# Patient Record
Sex: FEMALE | Race: BLACK OR AFRICAN AMERICAN
[De-identification: names, ages, dates, MRNs, and addresses within clinical notes are randomized per-mention and may not be internally consistent; named-entity substitution may affect disease eponyms.]

---

## 2017-01-19 ENCOUNTER — HOSPITAL ENCOUNTER (OUTPATIENT)
Dept: HOSPITAL 72 - LAB | Age: 72
Discharge: HOME | End: 2017-01-19
Attending: INTERNAL MEDICINE
Payer: MEDICAID

## 2017-01-19 DIAGNOSIS — K57.92: ICD-10-CM

## 2017-01-19 DIAGNOSIS — M25.50: ICD-10-CM

## 2017-01-19 DIAGNOSIS — I10: Primary | ICD-10-CM

## 2017-01-19 LAB
ALBUMIN/GLOB SERPL: 1.3 {RATIO} (ref 1–2.7)
ALT SERPL-CCNC: 15 U/L (ref 3–33)
ANION GAP SERPL CALC-SCNC: 15 MMOL/L (ref 5–15)
AST SERPL-CCNC: 18 U/L (ref 5–40)
BASOPHILS NFR BLD AUTO: 1.2 % (ref 0–2)
CALCIUM SERPL-MCNC: 9.8 MG/DL (ref 8.6–10.2)
CHLORIDE SERPL-SCNC: 102 MEQ/L (ref 98–107)
CHOLEST SERPL-MCNC: 155 MG/DL (ref ?–200)
CHOLEST/HDLC SERPL: 3.3 {RATIO} (ref 3.3–4.4)
CO2 SERPL-SCNC: 25 MEQ/L (ref 20–30)
CREAT SERPL-MCNC: 1.3 MG/DL (ref 0.5–0.9)
EOSINOPHIL NFR BLD AUTO: 1 % (ref 0–3)
ERYTHROCYTE [DISTWIDTH] IN BLOOD BY AUTOMATED COUNT: 11.2 % (ref 11.6–14.8)
GFR SERPLBLD BASED ON 1.73 SQ M-ARVRAT: 49.1 ML/MIN (ref 60–?)
GLOBULIN SER-MCNC: 3.3 G/DL
HBA1C MFR BLD: 5.1 % (ref ?–6)
HEMOLYSIS: 3
LDLC SERPL ELPH-MCNC: 92 MG/DL (ref 60–99)
LYMPHOCYTES NFR BLD AUTO: 27.4 % (ref 20–45)
MCH RBC QN AUTO: 33 PG (ref 27–31)
MCHC RBC AUTO-ENTMCNC: 32.6 G/DL (ref 32–36)
MCV RBC AUTO: 101 FL (ref 80–99)
MONOCYTES NFR BLD AUTO: 8.1 % (ref 1–10)
NEUTROPHILS NFR BLD AUTO: 62.4 % (ref 45–75)
PLATELET # BLD: 258 K/UL (ref 150–450)
PMV BLD AUTO: 6.8 FL (ref 6.5–10.1)
POTASSIUM SERPL-SCNC: 3.5 MEQ/L (ref 3.4–4.9)
PROT SERPL-MCNC: 7.6 G/DL (ref 6.6–8.7)
RBC # BLD AUTO: 4.06 M/UL (ref 4.2–5.4)
SODIUM SERPL-SCNC: 142 MEQ/L (ref 135–145)
TSH SERPL-ACNC: 0.78 UIU/ML (ref 0.3–4.5)
WBC # BLD AUTO: 4.2 K/UL (ref 4.8–10.8)

## 2017-01-19 PROCEDURE — 80061 LIPID PANEL: CPT

## 2017-01-19 PROCEDURE — 86039 ANTINUCLEAR ANTIBODIES (ANA): CPT

## 2017-01-19 PROCEDURE — 36415 COLL VENOUS BLD VENIPUNCTURE: CPT

## 2017-01-19 PROCEDURE — 86431 RHEUMATOID FACTOR QUANT: CPT

## 2017-01-19 PROCEDURE — 86200 CCP ANTIBODY: CPT

## 2017-01-19 PROCEDURE — 82085 ASSAY OF ALDOLASE: CPT

## 2017-01-19 PROCEDURE — 83036 HEMOGLOBIN GLYCOSYLATED A1C: CPT

## 2017-01-19 PROCEDURE — 84436 ASSAY OF TOTAL THYROXINE: CPT

## 2017-01-19 PROCEDURE — 85025 COMPLETE CBC W/AUTO DIFF WBC: CPT

## 2017-01-19 PROCEDURE — 80053 COMPREHEN METABOLIC PANEL: CPT

## 2017-01-19 PROCEDURE — 84443 ASSAY THYROID STIM HORMONE: CPT

## 2017-01-19 PROCEDURE — 82550 ASSAY OF CK (CPK): CPT

## 2017-01-20 LAB — RHEUMATOID FACT SER QL LA: <10 IU/ML (ref 0–13.9)

## 2017-01-21 LAB — ALDOLASE SERPL-CCNC: 5.3 U/L (ref 3.3–10.3)

## 2017-01-22 LAB — Lab: 10 UNITS (ref 0–19)

## 2017-03-09 ENCOUNTER — HOSPITAL ENCOUNTER (OUTPATIENT)
Dept: HOSPITAL 72 - LAB | Age: 72
Discharge: HOME | End: 2017-03-09
Attending: INTERNAL MEDICINE
Payer: MEDICARE

## 2017-03-09 DIAGNOSIS — J44.9: Primary | ICD-10-CM

## 2017-03-09 DIAGNOSIS — I10: ICD-10-CM

## 2017-03-09 DIAGNOSIS — M19.90: ICD-10-CM

## 2017-03-09 DIAGNOSIS — K21.9: ICD-10-CM

## 2017-03-09 LAB
ALBUMIN/GLOB SERPL: 1.2 {RATIO} (ref 1–2.7)
ALT SERPL-CCNC: 13 U/L (ref 3–33)
ANION GAP SERPL CALC-SCNC: 17 MMOL/L (ref 5–15)
AST SERPL-CCNC: 16 U/L (ref 5–40)
BASOPHILS NFR BLD AUTO: 1.3 % (ref 0–2)
CALCIUM SERPL-MCNC: 10.4 MG/DL (ref 8.6–10.2)
CHLORIDE SERPL-SCNC: 97 MEQ/L (ref 98–107)
CO2 SERPL-SCNC: 27 MEQ/L (ref 20–30)
CREAT SERPL-MCNC: 1.3 MG/DL (ref 0.5–0.9)
EOSINOPHIL NFR BLD AUTO: 0.5 % (ref 0–3)
ERYTHROCYTE [DISTWIDTH] IN BLOOD BY AUTOMATED COUNT: 11.8 % (ref 11.6–14.8)
GFR SERPLBLD BASED ON 1.73 SQ M-ARVRAT: 49.1 ML/MIN (ref 60–?)
GLOBULIN SER-MCNC: 3.6 G/DL
HBA1C MFR BLD: 4.9 % (ref ?–6)
HEMOLYSIS: 2
LYMPHOCYTES NFR BLD AUTO: 24.2 % (ref 20–45)
MCH RBC QN AUTO: 34 PG (ref 27–31)
MCHC RBC AUTO-ENTMCNC: 32.8 G/DL (ref 32–36)
MCV RBC AUTO: 104 FL (ref 80–99)
MONOCYTES NFR BLD AUTO: 8.1 % (ref 1–10)
NEUTROPHILS NFR BLD AUTO: 65.9 % (ref 45–75)
PLATELET # BLD: 284 K/UL (ref 150–450)
PMV BLD AUTO: 6.7 FL (ref 6.5–10.1)
POTASSIUM SERPL-SCNC: 3.6 MEQ/L (ref 3.4–4.9)
PROT SERPL-MCNC: 8.1 G/DL (ref 6.6–8.7)
RBC # BLD AUTO: 4.09 M/UL (ref 4.2–5.4)
SODIUM SERPL-SCNC: 141 MEQ/L (ref 135–145)
WBC # BLD AUTO: 5 K/UL (ref 4.8–10.8)

## 2017-03-09 PROCEDURE — 86812 HLA TYPING A B OR C: CPT

## 2017-03-09 PROCEDURE — 83036 HEMOGLOBIN GLYCOSYLATED A1C: CPT

## 2017-03-09 PROCEDURE — 87086 URINE CULTURE/COLONY COUNT: CPT

## 2017-03-09 PROCEDURE — 36415 COLL VENOUS BLD VENIPUNCTURE: CPT

## 2017-03-09 PROCEDURE — 87181 SC STD AGAR DILUTION PER AGT: CPT

## 2017-03-09 PROCEDURE — 80053 COMPREHEN METABOLIC PANEL: CPT

## 2017-03-09 PROCEDURE — 82085 ASSAY OF ALDOLASE: CPT

## 2017-03-09 PROCEDURE — 85025 COMPLETE CBC W/AUTO DIFF WBC: CPT

## 2017-03-09 PROCEDURE — 82550 ASSAY OF CK (CPK): CPT

## 2017-03-14 LAB — ALDOLASE SERPL-CCNC: 4 U/L (ref 3.3–10.3)

## 2017-03-28 ENCOUNTER — HOSPITAL ENCOUNTER (OUTPATIENT)
Dept: HOSPITAL 72 - ULS | Age: 72
Discharge: HOME | End: 2017-03-28
Payer: MEDICARE

## 2017-03-28 DIAGNOSIS — R10.9: Primary | ICD-10-CM

## 2017-03-28 DIAGNOSIS — N28.1: ICD-10-CM

## 2017-03-28 DIAGNOSIS — I49.9: ICD-10-CM

## 2017-03-28 DIAGNOSIS — R11.10: ICD-10-CM

## 2017-03-28 PROCEDURE — 93225 XTRNL ECG REC<48 HRS REC: CPT

## 2017-03-28 PROCEDURE — 93226 XTRNL ECG REC<48 HR SCAN A/R: CPT

## 2017-03-28 PROCEDURE — 76700 US EXAM ABDOM COMPLETE: CPT

## 2017-03-28 NOTE — DIAGNOSTIC IMAGING REPORT
Indications: Abdominal pain and vomiting



Technique: Transabdominal real-time grayscale and duplex Doppler imaging of the

upper abdomen and retroperitoneum was performed.



Findings:



Comparison: Abdominopelvic CT scan 6/27/16.



Liver normal size and surface contour, parenchymal echogenicity. No focal lesions.



Gallbladder unremarkable.  No intraluminal stones or sludge. No mural thickening 

or

adjacent fluid collections. Sonographic Hobbs sign not reported..



Bile ducts normal caliber.  Common bile duct 5 mm.



Pancreas head and body unremarkable; tail obscured.



Spleen unremarkable.



Right kidney unremarkable.



Left kidney contains 16 mm circumscribed anechoic masses emanating from upper 

pole

cortex, otherwise unremarkable.



Proximal and mid abdominal aorta, intrahepatic portion of inferior vena cava 

patent,

normal caliber. Distal bowel aorta obscured by bowel gas.



Duplex Doppler imaging demonstrates antegrade flow in splenic, portal, hepatic veins.



No ascites.



IMPRESSION:



Pancreatic tail, distal bowel aorta obscured



Left renal cortical cyst



Otherwise negative abdominal ultrasound.

## 2017-05-24 ENCOUNTER — HOSPITAL ENCOUNTER (EMERGENCY)
Dept: HOSPITAL 72 - EMR | Age: 72
Discharge: HOME | End: 2017-05-24
Payer: MEDICARE

## 2017-05-24 VITALS — SYSTOLIC BLOOD PRESSURE: 158 MMHG | DIASTOLIC BLOOD PRESSURE: 67 MMHG

## 2017-05-24 VITALS — WEIGHT: 187 LBS | BODY MASS INDEX: 31.92 KG/M2 | HEIGHT: 64 IN

## 2017-05-24 VITALS — SYSTOLIC BLOOD PRESSURE: 144 MMHG | DIASTOLIC BLOOD PRESSURE: 61 MMHG

## 2017-05-24 VITALS — SYSTOLIC BLOOD PRESSURE: 139 MMHG | DIASTOLIC BLOOD PRESSURE: 69 MMHG

## 2017-05-24 DIAGNOSIS — K43.9: ICD-10-CM

## 2017-05-24 DIAGNOSIS — N28.1: ICD-10-CM

## 2017-05-24 DIAGNOSIS — K57.30: ICD-10-CM

## 2017-05-24 DIAGNOSIS — E11.9: ICD-10-CM

## 2017-05-24 DIAGNOSIS — R11.10: ICD-10-CM

## 2017-05-24 DIAGNOSIS — R10.9: Primary | ICD-10-CM

## 2017-05-24 DIAGNOSIS — I10: ICD-10-CM

## 2017-05-24 DIAGNOSIS — J44.9: ICD-10-CM

## 2017-05-24 DIAGNOSIS — K76.0: ICD-10-CM

## 2017-05-24 DIAGNOSIS — Z87.19: ICD-10-CM

## 2017-05-24 DIAGNOSIS — Z88.6: ICD-10-CM

## 2017-05-24 DIAGNOSIS — K44.9: ICD-10-CM

## 2017-05-24 LAB
ALBUMIN/GLOB SERPL: 1.4 {RATIO} (ref 1–2.7)
ALT SERPL-CCNC: 20 U/L (ref 3–33)
ANION GAP SERPL CALC-SCNC: 15 MMOL/L (ref 5–15)
APPEARANCE UR: CLEAR
APTT BLD: 26 SEC (ref 23–33)
AST SERPL-CCNC: 27 U/L (ref 5–40)
BACTERIA #/AREA URNS HPF: (no result) /HPF
BASOPHILS NFR BLD AUTO: 1.4 % (ref 0–2)
BILIRUB DIRECT SERPL-MCNC: 0.3 MG/DL (ref 0.1–0.3)
CALCIUM SERPL-MCNC: 10.3 MG/DL (ref 8.6–10.2)
CHLORIDE SERPL-SCNC: 99 MEQ/L (ref 98–107)
CK MB SERPL-MCNC: < 1.5 NG/ML (ref ?–3.8)
CO2 SERPL-SCNC: 25 MEQ/L (ref 20–30)
CREAT SERPL-MCNC: 1.2 MG/DL (ref 0.5–0.9)
EOSINOPHIL NFR BLD AUTO: 0.5 % (ref 0–3)
ERYTHROCYTE [DISTWIDTH] IN BLOOD BY AUTOMATED COUNT: 11.6 % (ref 11.6–14.8)
GLOBULIN SER-MCNC: 3.1 G/DL
HEMOLYSIS: 5
INR PPP: 1 (ref 0.9–1.1)
KETONES UR QL STRIP: NEGATIVE
LEUKOCYTE ESTERASE UR QL STRIP: (no result)
LIPASE SERPL-CCNC: 25 U/L (ref ?–60)
LYMPHOCYTES NFR BLD AUTO: 26.5 % (ref 20–45)
MCH RBC QN AUTO: 33.9 PG (ref 27–31)
MCHC RBC AUTO-ENTMCNC: 33.4 G/DL (ref 32–36)
MCV RBC AUTO: 102 FL (ref 80–99)
MONOCYTES NFR BLD AUTO: 6.9 % (ref 1–10)
NEUTROPHILS NFR BLD AUTO: 64.7 % (ref 45–75)
NITRITE UR QL STRIP: NEGATIVE
PH UR STRIP: 7 [PH] (ref 4.5–8)
PLATELET # BLD: 267 K/UL (ref 150–450)
PMV BLD AUTO: 6.4 FL (ref 6.5–10.1)
POTASSIUM SERPL-SCNC: 3.3 MEQ/L (ref 3.4–4.9)
PROT SERPL-MCNC: 7.5 G/DL (ref 6.6–8.7)
PROT UR QL STRIP: NEGATIVE
PROTHROMBIN TIME: 10 SEC (ref 9.3–11.5)
RBC # BLD AUTO: 4.14 M/UL (ref 4.2–5.4)
RBC #/AREA URNS HPF: (no result) /HPF (ref 0–2)
SODIUM SERPL-SCNC: 139 MEQ/L (ref 135–145)
SP GR UR STRIP: 1 (ref 1–1.03)
SQUAMOUS #/AREA URNS LPF: (no result) /LPF
TROPONIN I SERPL-MCNC: < 0.3 NG/ML (ref ?–0.3)
UROBILINOGEN UR-MCNC: NORMAL MG/DL (ref 0–1)
WBC # BLD AUTO: 4.8 K/UL (ref 4.8–10.8)
WBC #/AREA URNS HPF: (no result) /HPF (ref 0–2)

## 2017-05-24 PROCEDURE — 96372 THER/PROPH/DIAG INJ SC/IM: CPT

## 2017-05-24 PROCEDURE — 85730 THROMBOPLASTIN TIME PARTIAL: CPT

## 2017-05-24 PROCEDURE — 82248 BILIRUBIN DIRECT: CPT

## 2017-05-24 PROCEDURE — 86901 BLOOD TYPING SEROLOGIC RH(D): CPT

## 2017-05-24 PROCEDURE — 96374 THER/PROPH/DIAG INJ IV PUSH: CPT

## 2017-05-24 PROCEDURE — 83690 ASSAY OF LIPASE: CPT

## 2017-05-24 PROCEDURE — 85610 PROTHROMBIN TIME: CPT

## 2017-05-24 PROCEDURE — 76937 US GUIDE VASCULAR ACCESS: CPT

## 2017-05-24 PROCEDURE — 99284 EMERGENCY DEPT VISIT MOD MDM: CPT

## 2017-05-24 PROCEDURE — 81003 URINALYSIS AUTO W/O SCOPE: CPT

## 2017-05-24 PROCEDURE — 36415 COLL VENOUS BLD VENIPUNCTURE: CPT

## 2017-05-24 PROCEDURE — 82553 CREATINE MB FRACTION: CPT

## 2017-05-24 PROCEDURE — 96360 HYDRATION IV INFUSION INIT: CPT

## 2017-05-24 PROCEDURE — 36569 INSJ PICC 5 YR+ W/O IMAGING: CPT

## 2017-05-24 PROCEDURE — 86850 RBC ANTIBODY SCREEN: CPT

## 2017-05-24 PROCEDURE — 84484 ASSAY OF TROPONIN QUANT: CPT

## 2017-05-24 PROCEDURE — 80053 COMPREHEN METABOLIC PANEL: CPT

## 2017-05-24 PROCEDURE — 85025 COMPLETE CBC W/AUTO DIFF WBC: CPT

## 2017-05-24 PROCEDURE — 96375 TX/PRO/DX INJ NEW DRUG ADDON: CPT

## 2017-05-24 PROCEDURE — 74177 CT ABD & PELVIS W/CONTRAST: CPT

## 2017-05-24 PROCEDURE — 86900 BLOOD TYPING SEROLOGIC ABO: CPT

## 2017-05-24 RX ADMIN — MORPHINE SULFATE ONE MG: 4 INJECTION INTRAVENOUS at 17:37

## 2017-05-24 RX ADMIN — MORPHINE SULFATE ONE MG: 4 INJECTION INTRAVENOUS at 14:42

## 2017-05-24 NOTE — EMERGENCY ROOM REPORT
History of Present Illness


General


Chief Complaint:  Abdominal Pain


Source:  Patient


 (FARZANA COREY M.D.)





Present Illness


HPI


72-year-old female presents ED for evaluation.  Patient was referred here from 

GI office for workup.  Patient has history of ulcerative colitis 

diverticulitis.  Patient states her last week she's been having abdominal pain 

with vomiting.  Notes blood in the stool.  The pain is a 10 out of 10, sharp, 

nonradiating.  Denies fevers chills.  Denies chest pain shortness of breath.  

Denies any blood thinners.  No other aggravating relieving factors.  Denies any 

other associated symptoms


 (FARZANA COREY M.D.)


Allergies:  


Coded Allergies:  


     CODEINE (Verified  Adverse Reaction, Mild, 1/25/13)


 nausea





Patient History


Past Medical History:  DM, HTN, asthma, COPD, other - diverticulitis, 

ulcerative coliits


Past Surgical History:  none


Pertinent Family History:  none


Social History:  Denies: alcohol use, drug use, smoking


Pregnant Now:  No


Immunizations:  UTD


Reviewed Nursing Documentation:  PMH: Agreed, PSxH: Agreed (FARZANA COREY M.D.)





Nursing Documentation-PMH


Past Medical History:  No History, Except For


Hx Hypertension:  Yes


Hx Pacemaker:  No


Hx Asthma:  Yes


Hx COPD:  Yes


Hx Diabetes:  Yes - controlled


Hx Cancer:  No


Hx Gastrointestinal Problems:  Yes - diverticulitis, ulcerative colitis


Hx Dialysis:  No


Hx Neurological Problems:  No


Hx Cerebrovascular Accident:  No


Hx Seizures:  Yes - 2 years ago


 (FARZANA COREY M.D.)





Review of Systems


All Other Systems:  negative except mentioned in HPI


 (FARZANA COREY M.D.)





Physical Exam





Vital Signs








  Date Time  Temp Pulse Resp B/P Pulse Ox O2 Delivery O2 Flow Rate FiO2


 


5/24/17 13:06 97.7 83 18 145/82 100 Room Air  








Sp02 EP Interpretation:  reviewed, normal


General Appearance:  no apparent distress, alert, GCS 15, non-toxic


Head:  normocephalic, atraumatic


Eyes:  bilateral eye PERRL, bilateral eye normal inspection


ENT:  hearing grossly normal, normal pharynx, no angioedema, normal voice


Neck:  full range of motion, supple/symm/no masses


Respiratory:  chest non-tender, lungs clear, normal breath sounds, speaking 

full sentences


Cardiovascular #1:  regular rate, rhythm, no edema


Cardiovascular #2:  2+ carotid (R), 2+ carotid (L), 2+ radial (R), 2+ radial (L)

, 2+ dorsalis pedis (R), 2+ dorsalis pedis (L)


Gastrointestinal:  normal bowel sounds, soft, non-distended, no guarding, no 

rebound, tenderness


Rectal:  deferred


Genitourinary:  normal inspection, no CVA tenderness


Musculoskeletal:  back normal, gait/station normal, normal range of motion, non-

tender


Neurologic:  alert, oriented x3, responsive, motor strength/tone normal, 

sensory intact, speech normal


Psychiatric:  judgement/insight normal, memory normal, mood/affect normal, no 

suicidal/homicidal ideation


Reflexes:  3+ bicep (R), 3+ bicep (L), 3+ tricep (R), 3+ tricep (L), 3+ knee (R)

, 3+ knee (L)


Skin:  normal color, no rash, warm/dry, well hydrated


Lymphatic:  no adenopathy


 (FARZANA COREY M.D.)





Medical Decision Making


Diagnostic Impression:  


 Primary Impression:  


 Abdominal pain


 Qualified Codes:  R10.84 - Generalized abdominal pain





Labs








Test


  5/24/17


13:30 5/24/17


14:00


 


Urine Color Yellow  


 


Urine Appearance Clear  


 


Urine pH 7 (4.5-8.0)  


 


Urine Specific Gravity


  1.005


(1.005-1.035) 


 


 


Urine Protein


  Negative


(NEGATIVE) 


 


 


Urine Glucose (UA)


  Negative


(NEGATIVE) 


 


 


Urine Ketones


  Negative


(NEGATIVE) 


 


 


Urine Occult Blood


  Negative


(NEGATIVE) 


 


 


Urine Nitrite


  Negative


(NEGATIVE) 


 


 


Urine Bilirubin


  Negative


(NEGATIVE) 


 


 


Urine Urobilinogen


  Normal MG/DL


(0.0-1.0) 


 


 


Urine Leukocyte Esterase 1+ (NEGATIVE)  


 


Urine RBC


  0-2 /HPF (0 -


2) 


 


 


Urine WBC


  0-2 /HPF (0 -


2) 


 


 


Urine Squamous Epithelial


Cells Few /LPF


(NONE/OCC) 


 


 


Urine Bacteria


  Occasional


/HPF (NONE) 


 


 


White Blood Count


  


  4.8 K/UL


(4.8-10.8)


 


Red Blood Count


  


  4.14 M/UL


(4.20-5.40)


 


Hemoglobin


  


  14.0 G/DL


(12.0-16.0)


 


Hematocrit


  


  42.1 %


(37.0-47.0)


 


Mean Corpuscular Volume  102 FL (80-99) 


 


Mean Corpuscular Hemoglobin


  


  33.9 PG


(27.0-31.0)


 


Mean Corpuscular Hemoglobin


Concent 


  33.4 G/DL


(32.0-36.0)


 


Red Cell Distribution Width


  


  11.6 %


(11.6-14.8)


 


Platelet Count


  


  267 K/UL


(150-450)


 


Mean Platelet Volume


  


  6.4 FL


(6.5-10.1)


 


Neutrophils (%) (Auto)


  


  64.7 %


(45.0-75.0)


 


Lymphocytes (%) (Auto)


  


  26.5 %


(20.0-45.0)


 


Monocytes (%) (Auto)


  


  6.9 %


(1.0-10.0)


 


Eosinophils (%) (Auto)


  


  0.5 %


(0.0-3.0)


 


Basophils (%) (Auto)


  


  1.4 %


(0.0-2.0)


 


Prothrombin Time


  


  10.0 SEC


(9.30-11.50)


 


Prothromb Time International


Ratio 


  1.0 (0.9-1.1) 


 


 


Activated Partial


Thromboplast Time 


  26 SEC (23-33) 


 


 


Sodium Level


  


  139 mEQ/L


(135-145)


 


Potassium Level


  


  3.3 mEQ/L


(3.4-4.9)


 


Chloride Level


  


  99 mEQ/L


()


 


Carbon Dioxide Level


  


  25 mEQ/L


(20-30)


 


Anion Gap  15 (5-15) 


 


Blood Urea Nitrogen


  


  19 mg/dL


(7-23)


 


Creatinine


  


  1.2 mg/dL


(0.5-0.9)


 


Estimat Glomerular Filtration


Rate 


   mL/min (>60) 


 


 


Glucose Level


  


  99 mg/dL


()


 


Calcium Level


  


  10.3 mg/dL


(8.6-10.2)


 


Total Bilirubin


  


  1.4 mg/dL


(0.0-1.2)


 


Direct Bilirubin


  


  0.3 mg/dL


(0.1-0.3)


 


Aspartate Amino Transf


(AST/SGOT) 


  27 U/L (5-40) 


 


 


Alanine Aminotransferase


(ALT/SGPT) 


  20 U/L (3-33) 


 


 


Alkaline Phosphatase


  


  83 U/L


()


 


Creatine Kinase MB


  


  < 1.5 ng/mL (<


3.8)


 


Troponin I


  


  < 0.30 ng/mL


(<=0.30)


 


Total Protein


  


  7.5 g/dL


(6.6-8.7)


 


Albumin


  


  4.4 g/dL


(3.5-5.2)


 


Globulin  3.1 g/dL 


 


Albumin/Globulin Ratio  1.4 (1.0-2.7) 


 


Lipase  25 U/L (< 60) 








 (FARZANA COREY M.D.)


ER Course


Received signout from Dr Corey to followup CTAP


No acute findings on CT


I reviewed labs: no leuks.  H&H stable.  Elevated SerumCr c/w known CKD


UA negative for UTI


Spoke to Dr Bal - recommends DC patient for small intestinal bacterial 

overgrowth with Abx and followup with him in office


Patient tolerating PO


PICC line removed


DC home


 (MAGNOLIA HUTTON M.D.)





Last Vital Signs








  Date Time  Temp Pulse Resp B/P Pulse Ox O2 Delivery O2 Flow Rate FiO2


 


5/24/17 14:47 97.8 81 17 144/61 100 Room Air  








 (FARZANA COREY M.D.)


Status:  improved


 (MAGNOLIA HUTTON M.D.)


Disposition:  HOME, SELF-CARE


Condition:  Serious


Scripts


Metronidazole* (FLAGYL*) 250 Mg Tablet


250 MG ORAL FOUR TIMES A DAY for 10 Days, #40 TAB 0 Refills


   Prov: MAGNOLIA HUTTON M.D.         5/24/17 


Neomycin Sulfate (Neomycin Sulfate) 500 Mg Tablet


500 MG ORAL BID for 10 Days, #20 TAB


   Prov: MAGNOLIA HUTTON M.D.         5/24/17


Referrals:  


GLORIA BLACKBURN (PCP)











FARZANA COREY M.D. May 24, 2017 15:24


MAGNOLIA HUTTON M.D. May 24, 2017 17:08

## 2017-05-24 NOTE — DIAGNOSTIC IMAGING REPORT
Indication: Abdominal pain



Technique: Continuous helical transaxial imaging of the abdomen and pelvis was

obtained from the lung bases to the pubic symphysis during intravenous 

contrast

administration. Coronal 2-D reformats were also obtained. Study obtained in a

Siemens sensation 64 slice CT.



Total Dose length Product (DLP):  883 mGycm



CT Dose Index Volume (CTDIvol):   18 mGy



Comparison: None



Findings: The lung bases are clear. There is a small hiatal hernia. 6 mm central

cyst noted in the liver. Gallbladder is grossly unremarkable. Bilateral renal cysts

are present. In the midpole the right kidney there is a heterogeneous 1.6 

earlier

hypodense mass with suggestion of enhancement (image 31, series 3). Followup

multiphase CT is recommended to evaluate for renal cell carcinoma. Approximately 1

cm indeterminate hypodensity noted in the lower pole the left kidney (image 37,

series 3).



Arterial vascular calcifications are present. The pancreas, spleen, both adrenal

glands are unremarkable. There is ventral hernia containing fat noted just above 

the

umbilicus. Diverticula are noted within the colon. There is no definite

diverticulitis appreciated. Uterus is noted. The appendix is not definite seen but

there are no secondary signs of appendicitis. Urinary bladder is unremarkable. There

is no free fluid or free air identified.



Impression:



No acute findings identified.



Incidental 1.6 cm mass probably enhancing in the midpole the right kidney. 

Followup

multiphase CT of the kidneys is recommended.



Indeterminate 1 cm hypodensity in the lower pole the left kidney. This is probably a

cyst but recommend further evaluation.



Diverticulosis of the colon.



Fatty liver



Liver cyst



Bilateral renal cysts



Small hiatal hernia



Small supraumbilical ventral hernia.



Atherosclerotic vascular disease



The CT scanner at [facility] is accredited by the American College 

of

Radiology and the scans are performed using dose optimization techniques as

appropriate to a performed exam including Automatic Exposure control.

## 2017-05-24 NOTE — DIAGNOSTIC IMAGING REPORT
Indication: Long term venous access



Findings: After the indications, procedure, risks, complications, and alternatives

of the procedure were explained, written informed consent was obtained.



The left upper extremity was prepped with alcohol.  All elements of maximal 

sterile

barrier technique were followed including usage of a cap, mask, sterile gown,

sterile gloves, hand hygiene and a large sterile sheet.



Sonographic evaluation of the upper extremity was performed demonstrating a patent

and compressible  basilic  vein.  Access was obtained under real-time ultrasound

guidance and digital image was saved and archived. An .018 wire was introduced.

Needle exchanged for a 5 Thai peel-away sheath. Measurements were obtained.



A 5 Thai dual-lumen Power PICC line catheter was cut to 42 cm and introduced over

the wire. Peel-away sheath and wire were removed.Catheter was secured to the skin

using 2-0 Prolene suture. Both ports aspirate and flush easily.



Fluoroscopic Images show distal tip in the superior vena cava. Total fluoroscopic

time 0.2 minutes



Impression: Successful placement of an upper extremity PICC line catheter

## 2017-07-17 ENCOUNTER — HOSPITAL ENCOUNTER (OUTPATIENT)
Dept: HOSPITAL 72 - MRI | Age: 72
Discharge: HOME | End: 2017-07-17
Payer: MEDICARE

## 2017-07-17 DIAGNOSIS — M50.322: ICD-10-CM

## 2017-07-17 DIAGNOSIS — M54.5: ICD-10-CM

## 2017-07-17 DIAGNOSIS — M54.2: Primary | ICD-10-CM

## 2017-07-17 DIAGNOSIS — G62.9: ICD-10-CM

## 2017-07-17 PROCEDURE — 72148 MRI LUMBAR SPINE W/O DYE: CPT

## 2017-07-17 PROCEDURE — 72141 MRI NECK SPINE W/O DYE: CPT

## 2017-07-17 NOTE — DIAGNOSTIC IMAGING REPORT
Indication: Back pain



Technique: MRI examination of the Lumbar spine was performed in a 1.5 Maria Guadalupe 

magnet.

Sequences obtained include sagittal and axial T1 and T2 fast spin echo, and 

sagittal

STIR. No IV gadolinium was given



Comparison: none



Findings: The conus medullaris tip is noted at about the level of the inferior

endplate of the L2 vertebra. The final terminale is prominent and shows fatty 

signal

spanning 4-5 cm consistent with fatty filum terminale. Fatty filum terminale is

usually an incidental finding.  However, given the low lying conus medullaris tip,

one should consider the possibility of tethered cord syndrome. Clinical evaluation

is needed.



Alignment and bone marrow signal are normal. There is fairly well maintained signal

and height of the lumbar vertebra with only minimal narrowing noted. There is no

disc herniation or neural impingement. There is no evidence of spinal stenosis,

lateral recess stenosis or neural foraminal narrowing. Mild hypertrophy of the

lumbar facets demonstrated at multiple levels, worst at L4-5 consistent with 

facet

osteoarthritis.



Impression:



Possible tethered cord syndrome given lipoma of the filum terminale and low-lying

conus medullaris. Clinical evaluation is recommended.



Mild degenerative changes as discussed above.

## 2017-09-20 ENCOUNTER — HOSPITAL ENCOUNTER (OUTPATIENT)
Dept: HOSPITAL 72 - LAB | Age: 72
Discharge: HOME | End: 2017-09-20
Payer: MEDICARE

## 2017-09-20 DIAGNOSIS — G62.9: Primary | ICD-10-CM

## 2017-09-20 LAB
ALBUMIN/GLOB SERPL: 1.4 {RATIO} (ref 1–2.7)
ALT SERPL-CCNC: 11 U/L (ref 3–33)
ANION GAP SERPL CALC-SCNC: 11 MMOL/L (ref 5–15)
AST SERPL-CCNC: 16 U/L (ref 5–40)
BASOPHILS NFR BLD AUTO: 1.1 % (ref 0–2)
CALCIUM SERPL-MCNC: 9.7 MG/DL (ref 8.6–10.2)
CHLORIDE SERPL-SCNC: 101 MEQ/L (ref 98–107)
CO2 SERPL-SCNC: 27 MEQ/L (ref 20–30)
CREAT SERPL-MCNC: 1.1 MG/DL (ref 0.5–0.9)
EOSINOPHIL NFR BLD AUTO: 0.4 % (ref 0–3)
ERYTHROCYTE [DISTWIDTH] IN BLOOD BY AUTOMATED COUNT: 11.3 % (ref 11.6–14.8)
FERRITIN SERPL-MCNC: 180 NG/ML (ref 13–150)
GLOBULIN SER-MCNC: 2.9 G/DL
HEMOLYSIS: 0
IRON SERPL-MCNC: 44 UG/DL (ref 37–145)
LDH SERPL L TO P-CCNC: 158 U/L (ref 135–230)
LYMPHOCYTES NFR BLD AUTO: 23.3 % (ref 20–45)
MCH RBC QN AUTO: 35.1 PG (ref 27–31)
MCHC RBC AUTO-ENTMCNC: 33.7 G/DL (ref 32–36)
MCV RBC AUTO: 104 FL (ref 80–99)
MONOCYTES NFR BLD AUTO: 6.2 % (ref 1–10)
NEUTROPHILS NFR BLD AUTO: 69 % (ref 45–75)
PLATELET # BLD: 299 K/UL (ref 150–450)
PMV BLD AUTO: 6.4 FL (ref 6.5–10.1)
POTASSIUM SERPL-SCNC: 3.7 MEQ/L (ref 3.4–4.9)
PROT SERPL-MCNC: 7.1 G/DL (ref 6.6–8.7)
RBC # BLD AUTO: 3.84 M/UL (ref 4.2–5.4)
RETICS/RBC NFR: 1.5 % (ref 0–2)
SODIUM SERPL-SCNC: 139 MEQ/L (ref 135–145)
TIBC SERPL-MCNC: 246 UG/DL (ref 250–400)
TSH SERPL-ACNC: 1.22 UIU/ML (ref 0.3–4.5)
VIT B12 SERPL-MCNC: 406 PG/ML (ref 211–946)
WBC # BLD AUTO: 4.8 K/UL (ref 4.8–10.8)

## 2017-09-20 PROCEDURE — 82785 ASSAY OF IGE: CPT

## 2017-09-20 PROCEDURE — 82607 VITAMIN B-12: CPT

## 2017-09-20 PROCEDURE — 83615 LACTATE (LD) (LDH) ENZYME: CPT

## 2017-09-20 PROCEDURE — 82232 ASSAY OF BETA-2 PROTEIN: CPT

## 2017-09-20 PROCEDURE — 86709 HEPATITIS A IGM ANTIBODY: CPT

## 2017-09-20 PROCEDURE — 86334 IMMUNOFIX E-PHORESIS SERUM: CPT

## 2017-09-20 PROCEDURE — 36415 COLL VENOUS BLD VENIPUNCTURE: CPT

## 2017-09-20 PROCEDURE — 84165 PROTEIN E-PHORESIS SERUM: CPT

## 2017-09-20 PROCEDURE — 84443 ASSAY THYROID STIM HORMONE: CPT

## 2017-09-20 PROCEDURE — 84480 ASSAY TRIIODOTHYRONINE (T3): CPT

## 2017-09-20 PROCEDURE — 84436 ASSAY OF TOTAL THYROXINE: CPT

## 2017-09-20 PROCEDURE — 82378 CARCINOEMBRYONIC ANTIGEN: CPT

## 2017-09-20 PROCEDURE — 86300 IMMUNOASSAY TUMOR CA 15-3: CPT

## 2017-09-20 PROCEDURE — 80053 COMPREHEN METABOLIC PANEL: CPT

## 2017-09-20 PROCEDURE — 83020 HEMOGLOBIN ELECTROPHORESIS: CPT

## 2017-09-20 PROCEDURE — 85044 MANUAL RETICULOCYTE COUNT: CPT

## 2017-09-20 PROCEDURE — 85025 COMPLETE CBC W/AUTO DIFF WBC: CPT

## 2017-09-20 PROCEDURE — 82784 ASSAY IGA/IGD/IGG/IGM EACH: CPT

## 2017-09-20 PROCEDURE — 83550 IRON BINDING TEST: CPT

## 2017-09-20 PROCEDURE — 86705 HEP B CORE ANTIBODY IGM: CPT

## 2017-09-20 PROCEDURE — 86803 HEPATITIS C AB TEST: CPT

## 2017-09-20 PROCEDURE — 83540 ASSAY OF IRON: CPT

## 2017-09-20 PROCEDURE — 82728 ASSAY OF FERRITIN: CPT

## 2017-09-20 PROCEDURE — 86304 IMMUNOASSAY TUMOR CA 125: CPT

## 2017-09-20 PROCEDURE — 86301 IMMUNOASSAY TUMOR CA 19-9: CPT

## 2017-09-20 PROCEDURE — 87340 HEPATITIS B SURFACE AG IA: CPT

## 2017-09-20 PROCEDURE — 82746 ASSAY OF FOLIC ACID SERUM: CPT

## 2017-09-20 PROCEDURE — 83010 ASSAY OF HAPTOGLOBIN QUANT: CPT

## 2017-09-20 PROCEDURE — 86703 HIV-1/HIV-2 1 RESULT ANTBDY: CPT

## 2017-09-21 LAB
ALBUMIN SERPL-MCNC: 4.2 G/DL (ref 2.9–4.4)
ALBUMIN/GLOB SERPL: 1.4 {RATIO} (ref 0.7–1.7)
ALPHA1 GLOB SERPL ELPH-MCNC: 0.2 G/DL (ref 0–0.4)
ALPHA2 GLOB SERPL ELPH-MCNC: 0.5 G/DL (ref 0.4–1)
B-GLOBULIN SERPL ELPH-MCNC: 0.8 G/DL (ref 0.7–1.3)
CANCER AG27-29 SERPL-ACNC: 13.7 U/ML (ref 0–38.6)
GAMMA GLOB SERPL ELPH-MCNC: 1.3 G/DL (ref 0.4–1.8)
GLOBULIN SER CALC-MCNC: 2.9 G/DL (ref 2.2–3.9)
IGA SERPL-MCNC: 208 MG/DL (ref 64–422)
IGG SERPL-MCNC: 1228 MG/DL (ref 700–1600)
IGM SERPL-MCNC: 65 MG/DL (ref 26–217)
PROT SERPL-MCNC: 7.1 G/DL (ref 6–8.5)

## 2017-09-22 ENCOUNTER — HOSPITAL ENCOUNTER (OUTPATIENT)
Dept: HOSPITAL 72 - LAB | Age: 72
Discharge: HOME | End: 2017-09-22
Payer: MEDICARE

## 2017-09-22 DIAGNOSIS — G62.9: Primary | ICD-10-CM

## 2017-09-22 LAB
ALBUMIN/GLOB SERPL: 1.3 {RATIO} (ref 1–2.7)
ALT SERPL-CCNC: 11 U/L (ref 3–33)
ANION GAP SERPL CALC-SCNC: 14 MMOL/L (ref 5–15)
AST SERPL-CCNC: 17 U/L (ref 5–40)
BASOPHILS NFR BLD AUTO: 1.8 % (ref 0–2)
CALCIUM SERPL-MCNC: 10 MG/DL (ref 8.6–10.2)
CHLORIDE SERPL-SCNC: 100 MEQ/L (ref 98–107)
CO2 SERPL-SCNC: 28 MEQ/L (ref 20–30)
CREAT SERPL-MCNC: 1.2 MG/DL (ref 0.5–0.9)
EOSINOPHIL NFR BLD AUTO: 0.6 % (ref 0–3)
ERYTHROCYTE [DISTWIDTH] IN BLOOD BY AUTOMATED COUNT: 11.2 % (ref 11.6–14.8)
GLOBULIN SER-MCNC: 3.2 G/DL
HEMOLYSIS: 6
LYMPHOCYTES NFR BLD AUTO: 28.7 % (ref 20–45)
MCH RBC QN AUTO: 35.5 PG (ref 27–31)
MCHC RBC AUTO-ENTMCNC: 33.7 G/DL (ref 32–36)
MCV RBC AUTO: 105 FL (ref 80–99)
MONOCYTES NFR BLD AUTO: 8.4 % (ref 1–10)
NEUTROPHILS NFR BLD AUTO: 60.6 % (ref 45–75)
PLATELET # BLD: 303 K/UL (ref 150–450)
PMV BLD AUTO: 6.4 FL (ref 6.5–10.1)
POTASSIUM SERPL-SCNC: 3.6 MEQ/L (ref 3.4–4.9)
PROT SERPL-MCNC: 7.6 G/DL (ref 6.6–8.7)
RBC # BLD AUTO: 3.75 M/UL (ref 4.2–5.4)
SODIUM SERPL-SCNC: 142 MEQ/L (ref 135–145)
TSH SERPL-ACNC: 0.67 UIU/ML (ref 0.3–4.5)
VIT B12 SERPL-MCNC: 364 PG/ML (ref 211–946)
WBC # BLD AUTO: 4.1 K/UL (ref 4.8–10.8)

## 2017-09-22 PROCEDURE — 86039 ANTINUCLEAR ANTIBODIES (ANA): CPT

## 2017-09-22 PROCEDURE — 85025 COMPLETE CBC W/AUTO DIFF WBC: CPT

## 2017-09-22 PROCEDURE — 86592 SYPHILIS TEST NON-TREP QUAL: CPT

## 2017-09-22 PROCEDURE — 80053 COMPREHEN METABOLIC PANEL: CPT

## 2017-09-22 PROCEDURE — 82306 VITAMIN D 25 HYDROXY: CPT

## 2017-09-22 PROCEDURE — 82607 VITAMIN B-12: CPT

## 2017-09-22 PROCEDURE — 36415 COLL VENOUS BLD VENIPUNCTURE: CPT

## 2017-09-22 PROCEDURE — 84443 ASSAY THYROID STIM HORMONE: CPT

## 2017-09-25 LAB
CANCER AG125 SERPL-ACNC: 7.1 U/ML (ref 0–38.1)
CANCER AG15-3 SERPL-ACNC: 11.9 U/ML (ref 0–25)
HGB A MFR BLD: 97.6 % (ref 94–98)
HGB A2 MFR BLD COLUMN CHROM: 2.4 % (ref 0.7–3.1)
HGB C MFR BLD: 0 %
HGB F MFR AMN: 0 % (ref 0–2)
HGB S MFR BLD: 0 %
IGE SERPL-ACNC: 18 IU/ML (ref 0–100)
Lab: 2 MG/L (ref 0.6–2.4)
VITAMIN D 25-OH TOTAL: 25 NG/ML

## 2017-10-18 ENCOUNTER — HOSPITAL ENCOUNTER (OUTPATIENT)
Dept: HOSPITAL 72 - LLL | Age: 72
Discharge: HOME | End: 2017-10-18
Payer: MEDICARE

## 2017-10-18 DIAGNOSIS — G62.9: Primary | ICD-10-CM

## 2017-10-18 LAB
ALT SERPL-CCNC: 22 U/L (ref 12–78)
ANION GAP SERPL CALC-SCNC: 7 MMOL/L (ref 5–15)
APTT BLD: 25 SEC (ref 23–33)
AST SERPL-CCNC: 17 U/L (ref 15–37)
BASOPHILS NFR BLD MANUAL: 1 % (ref 0–2)
BILIRUB DIRECT SERPL-MCNC: 0.1 MG/DL (ref 0–0.3)
CALCIUM SERPL-MCNC: 9.3 MG/DL (ref 8.5–10.1)
CHLORIDE SERPL-SCNC: 107 MMOL/L (ref 98–107)
CO2 SERPL-SCNC: 29 MMOL/L (ref 21–32)
CREAT SERPL-MCNC: 1.4 MG/DL (ref 0.55–1.3)
EOSINOPHIL NFR BLD MANUAL: 2 % (ref 0–3)
ERYTHROCYTE [DISTWIDTH] IN BLOOD BY AUTOMATED COUNT: 11.7 % (ref 11.6–14.8)
INR PPP: 0.9 (ref 0.9–1.1)
MACROCYTES: (no result)
MCH RBC QN AUTO: 35.7 PG (ref 27–31)
MCHC RBC AUTO-ENTMCNC: 33.1 G/DL (ref 32–36)
MCV RBC AUTO: 108 FL (ref 80–99)
NEUTS BAND NFR BLD MANUAL: 0 % (ref 0–8)
NEUTS BAND NFR BLD MANUAL: 56 % (ref 45–75)
PLAT MORPH BLD: NORMAL
PLATELET # BLD EST: ADEQUATE 10*3/UL
PLATELET # BLD: 235 K/UL (ref 150–450)
PMV BLD AUTO: 6.3 FL (ref 6.5–10.1)
POTASSIUM SERPL-SCNC: 4 MMOL/L (ref 3.5–5.1)
PROT SERPL-MCNC: 7.2 G/DL (ref 6.4–8.2)
PROTHROMBIN TIME: 9.4 SEC (ref 9.3–11.5)
RBC # BLD AUTO: 3.42 M/UL (ref 4.2–5.4)
SODIUM SERPL-SCNC: 143 MMOL/L (ref 136–145)
TOTAL CELLS COUNTED BLD: 100
TSH SERPL-ACNC: 1.19 UIU/ML (ref 0.36–3.74)
VARIANT LYMPHS NFR BLD MANUAL: 35 % (ref 20–45)
WBC # BLD AUTO: 3.3 K/UL (ref 4.8–10.8)

## 2017-10-18 PROCEDURE — 85730 THROMBOPLASTIN TIME PARTIAL: CPT

## 2017-10-18 PROCEDURE — 86300 IMMUNOASSAY TUMOR CA 15-3: CPT

## 2017-10-18 PROCEDURE — 86304 IMMUNOASSAY TUMOR CA 125: CPT

## 2017-10-18 PROCEDURE — 80076 HEPATIC FUNCTION PANEL: CPT

## 2017-10-18 PROCEDURE — 80048 BASIC METABOLIC PNL TOTAL CA: CPT

## 2017-10-18 PROCEDURE — 36415 COLL VENOUS BLD VENIPUNCTURE: CPT

## 2017-10-18 PROCEDURE — 85007 BL SMEAR W/DIFF WBC COUNT: CPT

## 2017-10-18 PROCEDURE — 82784 ASSAY IGA/IGD/IGG/IGM EACH: CPT

## 2017-10-18 PROCEDURE — 85025 COMPLETE CBC W/AUTO DIFF WBC: CPT

## 2017-10-18 PROCEDURE — 84436 ASSAY OF TOTAL THYROXINE: CPT

## 2017-10-18 PROCEDURE — 86301 IMMUNOASSAY TUMOR CA 19-9: CPT

## 2017-10-18 PROCEDURE — 85610 PROTHROMBIN TIME: CPT

## 2017-10-18 PROCEDURE — 84443 ASSAY THYROID STIM HORMONE: CPT

## 2017-10-19 LAB
CANCER AG27-29 SERPL-ACNC: 15.6 U/ML (ref 0–38.6)
IGA SERPL-MCNC: 219 MG/DL (ref 64–422)
IGG SERPL-MCNC: 1171 MG/DL (ref 700–1600)
IGM SERPL-MCNC: 61 MG/DL (ref 26–217)
T3 SERPL-MCNC: 110 NG/DL (ref 71–180)

## 2017-10-20 LAB
CANCER AG125 SERPL-ACNC: 5.9 U/ML (ref 0–38.1)
CANCER AG15-3 SERPL-ACNC: 11.8 U/ML (ref 0–25)

## 2017-11-22 ENCOUNTER — HOSPITAL ENCOUNTER (OUTPATIENT)
Dept: HOSPITAL 72 - LAB | Age: 72
Discharge: HOME | End: 2017-11-22
Payer: MEDICARE

## 2017-11-22 DIAGNOSIS — D64.9: ICD-10-CM

## 2017-11-22 DIAGNOSIS — Z79.899: ICD-10-CM

## 2017-11-22 DIAGNOSIS — I10: Primary | ICD-10-CM

## 2017-11-22 DIAGNOSIS — E55.9: ICD-10-CM

## 2017-11-22 LAB
ALT SERPL-CCNC: 23 U/L (ref 12–78)
ANION GAP SERPL CALC-SCNC: 11 MMOL/L (ref 5–15)
AST SERPL-CCNC: 19 U/L (ref 15–37)
BILIRUB DIRECT SERPL-MCNC: 0.2 MG/DL (ref 0–0.3)
CALCIUM SERPL-MCNC: 9.9 MG/DL (ref 8.5–10.1)
CHLORIDE SERPL-SCNC: 103 MMOL/L (ref 98–107)
CO2 SERPL-SCNC: 28 MMOL/L (ref 21–32)
CREAT SERPL-MCNC: 1.6 MG/DL (ref 0.55–1.3)
FERRITIN SERPL-MCNC: 139 NG/ML (ref 8–388)
FOLATE SERPL-MCNC: 19.1 NG/ML (ref 3.1–17.5)
IRON SERPL-MCNC: 71 UG/DL (ref 50–175)
POTASSIUM SERPL-SCNC: 3.6 MMOL/L (ref 3.5–5.1)
PROT SERPL-MCNC: 8.6 G/DL (ref 6.4–8.2)
SODIUM SERPL-SCNC: 142 MMOL/L (ref 136–145)
TIBC SERPL-MCNC: 344 UG/DL (ref 250–450)
VIT B12 SERPL-MCNC: 458 PG/ML (ref 193–986)

## 2017-11-22 PROCEDURE — 80048 BASIC METABOLIC PNL TOTAL CA: CPT

## 2017-11-22 PROCEDURE — 83540 ASSAY OF IRON: CPT

## 2017-11-22 PROCEDURE — 83550 IRON BINDING TEST: CPT

## 2017-11-22 PROCEDURE — 82728 ASSAY OF FERRITIN: CPT

## 2017-11-22 PROCEDURE — 82607 VITAMIN B-12: CPT

## 2017-11-22 PROCEDURE — 36415 COLL VENOUS BLD VENIPUNCTURE: CPT

## 2017-11-22 PROCEDURE — 80076 HEPATIC FUNCTION PANEL: CPT

## 2017-11-22 PROCEDURE — 82746 ASSAY OF FOLIC ACID SERUM: CPT

## 2017-12-26 ENCOUNTER — HOSPITAL ENCOUNTER (OUTPATIENT)
Dept: HOSPITAL 72 - LAB | Age: 72
Discharge: HOME | End: 2017-12-26
Payer: MEDICARE

## 2017-12-26 DIAGNOSIS — C64.9: Primary | ICD-10-CM

## 2017-12-26 LAB
ALT SERPL-CCNC: 19 U/L (ref 12–78)
ANION GAP SERPL CALC-SCNC: 7 MMOL/L (ref 5–15)
APTT BLD: 25 SEC (ref 23–33)
AST SERPL-CCNC: 16 U/L (ref 15–37)
BASOPHILS NFR BLD MANUAL: 0 % (ref 0–2)
BILIRUB DIRECT SERPL-MCNC: 0.2 MG/DL (ref 0–0.3)
CALCIUM SERPL-MCNC: 8.6 MG/DL (ref 8.5–10.1)
CHLORIDE SERPL-SCNC: 103 MMOL/L (ref 98–107)
CO2 SERPL-SCNC: 30 MMOL/L (ref 21–32)
CREAT SERPL-MCNC: 1.2 MG/DL (ref 0.55–1.3)
EOSINOPHIL NFR BLD MANUAL: 0 % (ref 0–3)
ERYTHROCYTE [DISTWIDTH] IN BLOOD BY AUTOMATED COUNT: 10.9 % (ref 11.6–14.8)
FERRITIN SERPL-MCNC: 120 NG/ML (ref 8–388)
FOLATE SERPL-MCNC: 14.3 NG/ML (ref 8.6–58.9)
INR PPP: 0.9 (ref 0.9–1.1)
IRON SERPL-MCNC: 95 UG/DL (ref 50–175)
MACROCYTES: (no result)
MCH RBC QN AUTO: 32.9 PG (ref 27–31)
MCHC RBC AUTO-ENTMCNC: 32.3 G/DL (ref 32–36)
MCV RBC AUTO: 102 FL (ref 80–99)
NEUTS BAND NFR BLD MANUAL: 0 % (ref 0–8)
NEUTS BAND NFR BLD MANUAL: 58 % (ref 45–75)
PLAT MORPH BLD: NORMAL
PLATELET # BLD EST: ADEQUATE 10*3/UL
PLATELET # BLD: 284 K/UL (ref 150–450)
PMV BLD AUTO: 6.8 FL (ref 6.5–10.1)
POTASSIUM SERPL-SCNC: 3.9 MMOL/L (ref 3.5–5.1)
PROT SERPL-MCNC: 8 G/DL (ref 6.4–8.2)
PROTHROMBIN TIME: 9.6 SEC (ref 9.3–11.5)
RBC # BLD AUTO: 4.02 M/UL (ref 4.2–5.4)
SODIUM SERPL-SCNC: 140 MMOL/L (ref 136–145)
TIBC SERPL-MCNC: 317 UG/DL (ref 250–450)
TOTAL CELLS COUNTED BLD: 100
VARIANT LYMPHS NFR BLD MANUAL: 37 % (ref 20–45)
VIT B12 SERPL-MCNC: 304 PG/ML (ref 193–986)
WBC # BLD AUTO: 3.4 K/UL (ref 4.8–10.8)

## 2017-12-26 PROCEDURE — 80048 BASIC METABOLIC PNL TOTAL CA: CPT

## 2017-12-26 PROCEDURE — 85730 THROMBOPLASTIN TIME PARTIAL: CPT

## 2017-12-26 PROCEDURE — 82746 ASSAY OF FOLIC ACID SERUM: CPT

## 2017-12-26 PROCEDURE — 83540 ASSAY OF IRON: CPT

## 2017-12-26 PROCEDURE — 82607 VITAMIN B-12: CPT

## 2017-12-26 PROCEDURE — 83550 IRON BINDING TEST: CPT

## 2017-12-26 PROCEDURE — 82378 CARCINOEMBRYONIC ANTIGEN: CPT

## 2017-12-26 PROCEDURE — 85610 PROTHROMBIN TIME: CPT

## 2017-12-26 PROCEDURE — 85025 COMPLETE CBC W/AUTO DIFF WBC: CPT

## 2017-12-26 PROCEDURE — 80076 HEPATIC FUNCTION PANEL: CPT

## 2017-12-26 PROCEDURE — 85007 BL SMEAR W/DIFF WBC COUNT: CPT

## 2017-12-26 PROCEDURE — 86304 IMMUNOASSAY TUMOR CA 125: CPT

## 2017-12-26 PROCEDURE — 86850 RBC ANTIBODY SCREEN: CPT

## 2017-12-26 PROCEDURE — 86900 BLOOD TYPING SEROLOGIC ABO: CPT

## 2017-12-26 PROCEDURE — 82728 ASSAY OF FERRITIN: CPT

## 2017-12-26 PROCEDURE — 86901 BLOOD TYPING SEROLOGIC RH(D): CPT

## 2017-12-26 PROCEDURE — 36415 COLL VENOUS BLD VENIPUNCTURE: CPT

## 2017-12-27 LAB — CANCER AG125 SERPL-ACNC: 5.2 U/ML (ref 0–38.1)

## 2018-01-03 ENCOUNTER — HOSPITAL ENCOUNTER (OUTPATIENT)
Dept: HOSPITAL 72 - LAB | Age: 73
Discharge: HOME | End: 2018-01-03
Payer: MEDICARE

## 2018-01-03 VITALS — WEIGHT: 1 LBS | BODY MASS INDEX: 0.23 KG/M2 | HEIGHT: 55 IN

## 2018-01-03 DIAGNOSIS — I49.9: Primary | ICD-10-CM

## 2018-01-03 DIAGNOSIS — I10: ICD-10-CM

## 2018-01-03 DIAGNOSIS — J44.9: ICD-10-CM

## 2018-01-03 LAB
ADD MANUAL DIFF: NO
ANION GAP SERPL CALC-SCNC: 7 MMOL/L (ref 5–15)
APPEARANCE UR: CLEAR
APTT BLD: 25 SEC (ref 23–33)
APTT PPP: YELLOW S
BASOPHILS NFR BLD AUTO: 1.5 % (ref 0–2)
BUN SERPL-MCNC: 24 MG/DL (ref 7–18)
CALCIUM SERPL-MCNC: 8.7 MG/DL (ref 8.5–10.1)
CHLORIDE SERPL-SCNC: 108 MMOL/L (ref 98–107)
CO2 SERPL-SCNC: 29 MMOL/L (ref 21–32)
CREAT SERPL-MCNC: 1.1 MG/DL (ref 0.55–1.3)
EOSINOPHIL NFR BLD AUTO: 0.6 % (ref 0–3)
ERYTHROCYTE [DISTWIDTH] IN BLOOD BY AUTOMATED COUNT: 11.3 % (ref 11.6–14.8)
GLUCOSE UR STRIP-MCNC: NEGATIVE MG/DL
HCT VFR BLD CALC: 44.4 % (ref 37–47)
HGB BLD-MCNC: 13.7 G/DL (ref 12–16)
INR PPP: 0.9 (ref 0.9–1.1)
KETONES UR QL STRIP: NEGATIVE
LEUKOCYTE ESTERASE UR QL STRIP: (no result)
LYMPHOCYTES NFR BLD AUTO: 26.8 % (ref 20–45)
MCV RBC AUTO: 104 FL (ref 80–99)
MONOCYTES NFR BLD AUTO: 6.2 % (ref 1–10)
NEUTROPHILS NFR BLD AUTO: 64.9 % (ref 45–75)
NITRITE UR QL STRIP: NEGATIVE
PH UR STRIP: 6 [PH] (ref 4.5–8)
PLATELET # BLD: 265 K/UL (ref 150–450)
POTASSIUM SERPL-SCNC: 3.6 MMOL/L (ref 3.5–5.1)
PROT UR QL STRIP: NEGATIVE
RBC # BLD AUTO: 4.29 M/UL (ref 4.2–5.4)
SODIUM SERPL-SCNC: 144 MMOL/L (ref 136–145)
SP GR UR STRIP: 1.01 (ref 1–1.03)
UROBILINOGEN UR-MCNC: 1 MG/DL (ref 0–1)
WBC # BLD AUTO: 5.2 K/UL (ref 4.8–10.8)

## 2018-01-03 PROCEDURE — 85730 THROMBOPLASTIN TIME PARTIAL: CPT

## 2018-01-03 PROCEDURE — 36415 COLL VENOUS BLD VENIPUNCTURE: CPT

## 2018-01-03 PROCEDURE — 80048 BASIC METABOLIC PNL TOTAL CA: CPT

## 2018-01-03 PROCEDURE — 93005 ELECTROCARDIOGRAM TRACING: CPT

## 2018-01-03 PROCEDURE — 71046 X-RAY EXAM CHEST 2 VIEWS: CPT

## 2018-01-03 PROCEDURE — 81001 URINALYSIS AUTO W/SCOPE: CPT

## 2018-01-03 PROCEDURE — 85025 COMPLETE CBC W/AUTO DIFF WBC: CPT

## 2018-01-03 PROCEDURE — 85610 PROTHROMBIN TIME: CPT

## 2018-01-03 NOTE — DIAGNOSTIC IMAGING REPORT
Indication: Cough

 

Technique: 2 views of the chest

 

Comparison: One view chest 3/11/2015.

 

Findings: Lungs and pleural spaces are clear. Heart size is normal. Bones are

unremarkable..

 

Impression: No acute process

## 2018-01-13 NOTE — CARDIOLOGY REPORT
--------------- APPROVED REPORT --------------





EKG Measurement

Heart [id number]

NM 

[id number]

[id number]

[id number]

[id number]





Normal sinus rhythm

T wave abnormality, consider inferior ischemia

T wave abnormality, consider anterolateral ischemia

Abnormal ECG

## 2018-02-13 LAB
ADD MANUAL DIFF: NO
ANION GAP SERPL CALC-SCNC: 9 MMOL/L (ref 5–15)
APTT BLD: 26 SEC (ref 23–33)
BASOPHILS NFR BLD AUTO: 1.2 % (ref 0–2)
BUN SERPL-MCNC: 25 MG/DL (ref 7–18)
CALCIUM SERPL-MCNC: 10 MG/DL (ref 8.5–10.1)
CHLORIDE SERPL-SCNC: 104 MMOL/L (ref 98–107)
CO2 SERPL-SCNC: 27 MMOL/L (ref 21–32)
CREAT SERPL-MCNC: 1.2 MG/DL (ref 0.55–1.3)
EOSINOPHIL NFR BLD AUTO: 1.6 % (ref 0–3)
ERYTHROCYTE [DISTWIDTH] IN BLOOD BY AUTOMATED COUNT: 10.8 % (ref 11.6–14.8)
HCT VFR BLD CALC: 43.9 % (ref 37–47)
HGB BLD-MCNC: 14.8 G/DL (ref 12–16)
INR PPP: 0.9 (ref 0.9–1.1)
LYMPHOCYTES NFR BLD AUTO: 26.8 % (ref 20–45)
MCV RBC AUTO: 100 FL (ref 80–99)
MONOCYTES NFR BLD AUTO: 6.2 % (ref 1–10)
NEUTROPHILS NFR BLD AUTO: 64.2 % (ref 45–75)
PLATELET # BLD: 269 K/UL (ref 150–450)
POTASSIUM SERPL-SCNC: 3.2 MMOL/L (ref 3.5–5.1)
RBC # BLD AUTO: 4.37 M/UL (ref 4.2–5.4)
SODIUM SERPL-SCNC: 140 MMOL/L (ref 136–145)
WBC # BLD AUTO: 5.8 K/UL (ref 4.8–10.8)

## 2018-02-15 NOTE — CARDIOLOGY REPORT
--------------- APPROVED REPORT --------------





EKG Measurement

Heart [id number]

KS 

128P71

[id number]

[id number]

[id number]





Normal sinus rhythm

Left axis deviation

Nonspecific T wave abnormality

Abnormal ECG

## 2018-02-21 NOTE — DIAGNOSTIC IMAGING REPORT
Indication: Cough, preop

 

Technique: XRAY Chest 2v

 

Comparison: 1/3/2018

 

Findings: Heart size and mediastinal contours are within normal limits and stable

compared to the prior exam. There is no focal airspace consolidation, pleural

effusion or pneumothorax. No acute osseous abnormality is seen.

 

Impression: No radiographic evidence of acute cardiopulmonary disease.

## 2018-02-28 ENCOUNTER — HOSPITAL ENCOUNTER (INPATIENT)
Dept: HOSPITAL 72 - SDSOVERFLO | Age: 73
LOS: 39 days | Discharge: HOME | DRG: 658 | End: 2018-04-08
Payer: MEDICARE

## 2018-02-28 VITALS — HEIGHT: 64 IN | BODY MASS INDEX: 31.58 KG/M2 | WEIGHT: 185 LBS

## 2018-02-28 DIAGNOSIS — D49.511: Primary | ICD-10-CM

## 2018-02-28 DIAGNOSIS — I10: ICD-10-CM

## 2018-02-28 DIAGNOSIS — K57.90: ICD-10-CM

## 2018-02-28 DIAGNOSIS — Z88.6: ICD-10-CM

## 2018-02-28 DIAGNOSIS — E78.5: ICD-10-CM

## 2018-02-28 DIAGNOSIS — K59.00: ICD-10-CM

## 2018-02-28 PROCEDURE — 85730 THROMBOPLASTIN TIME PARTIAL: CPT

## 2018-02-28 PROCEDURE — 94003 VENT MGMT INPAT SUBQ DAY: CPT

## 2018-02-28 PROCEDURE — 94640 AIRWAY INHALATION TREATMENT: CPT

## 2018-02-28 PROCEDURE — 71046 X-RAY EXAM CHEST 2 VIEWS: CPT

## 2018-02-28 PROCEDURE — 86850 RBC ANTIBODY SCREEN: CPT

## 2018-02-28 PROCEDURE — 85610 PROTHROMBIN TIME: CPT

## 2018-02-28 PROCEDURE — 85025 COMPLETE CBC W/AUTO DIFF WBC: CPT

## 2018-02-28 PROCEDURE — 86901 BLOOD TYPING SEROLOGIC RH(D): CPT

## 2018-02-28 PROCEDURE — 80048 BASIC METABOLIC PNL TOTAL CA: CPT

## 2018-02-28 PROCEDURE — 87081 CULTURE SCREEN ONLY: CPT

## 2018-02-28 PROCEDURE — 93005 ELECTROCARDIOGRAM TRACING: CPT

## 2018-02-28 PROCEDURE — 94150 VITAL CAPACITY TEST: CPT

## 2018-02-28 PROCEDURE — 86900 BLOOD TYPING SEROLOGIC ABO: CPT

## 2018-02-28 PROCEDURE — 36415 COLL VENOUS BLD VENIPUNCTURE: CPT

## 2018-03-29 ENCOUNTER — HOSPITAL ENCOUNTER (OUTPATIENT)
Dept: HOSPITAL 72 - RAD | Age: 73
Discharge: HOME | End: 2018-03-29
Payer: MEDICARE

## 2018-03-29 DIAGNOSIS — I49.9: Primary | ICD-10-CM

## 2018-03-29 LAB
ADD MANUAL DIFF: NO
ALBUMIN SERPL-MCNC: 3.8 G/DL (ref 3.4–5)
ALBUMIN/GLOB SERPL: 1 {RATIO} (ref 1–2.7)
ALP SERPL-CCNC: 104 U/L (ref 46–116)
ALT SERPL-CCNC: 18 U/L (ref 12–78)
ANION GAP SERPL CALC-SCNC: 8 MMOL/L (ref 5–15)
APPEARANCE UR: CLEAR
APTT BLD: 25 SEC (ref 23–33)
APTT PPP: (no result) S
AST SERPL-CCNC: 18 U/L (ref 15–37)
BASOPHILS NFR BLD AUTO: 1.6 % (ref 0–2)
BILIRUB SERPL-MCNC: 0.8 MG/DL (ref 0.2–1)
BUN SERPL-MCNC: 27 MG/DL (ref 7–18)
CALCIUM SERPL-MCNC: 9.2 MG/DL (ref 8.5–10.1)
CHLORIDE SERPL-SCNC: 106 MMOL/L (ref 98–107)
CO2 SERPL-SCNC: 30 MMOL/L (ref 21–32)
CREAT SERPL-MCNC: 1.2 MG/DL (ref 0.55–1.3)
EOSINOPHIL NFR BLD AUTO: 2.1 % (ref 0–3)
ERYTHROCYTE [DISTWIDTH] IN BLOOD BY AUTOMATED COUNT: 11.2 % (ref 11.6–14.8)
GLOBULIN SER-MCNC: 3.9 G/DL
GLUCOSE UR STRIP-MCNC: NEGATIVE MG/DL
HCT VFR BLD CALC: 40.3 % (ref 37–47)
HGB BLD-MCNC: 13.7 G/DL (ref 12–16)
INR PPP: 0.9 (ref 0.9–1.1)
KETONES UR QL STRIP: NEGATIVE
LEUKOCYTE ESTERASE UR QL STRIP: (no result)
LYMPHOCYTES NFR BLD AUTO: 34.2 % (ref 20–45)
MCV RBC AUTO: 99 FL (ref 80–99)
MONOCYTES NFR BLD AUTO: 8.1 % (ref 1–10)
NEUTROPHILS NFR BLD AUTO: 54 % (ref 45–75)
NITRITE UR QL STRIP: NEGATIVE
PH UR STRIP: 7 [PH] (ref 4.5–8)
PLATELET # BLD: 224 K/UL (ref 150–450)
POTASSIUM SERPL-SCNC: 3.4 MMOL/L (ref 3.5–5.1)
PROT UR QL STRIP: (no result)
RBC # BLD AUTO: 4.08 M/UL (ref 4.2–5.4)
SODIUM SERPL-SCNC: 143 MMOL/L (ref 136–145)
SP GR UR STRIP: 1.01 (ref 1–1.03)
UROBILINOGEN UR-MCNC: 4 MG/DL (ref 0–1)
WBC # BLD AUTO: 4 K/UL (ref 4.8–10.8)

## 2018-03-29 PROCEDURE — 85610 PROTHROMBIN TIME: CPT

## 2018-03-29 PROCEDURE — 36415 COLL VENOUS BLD VENIPUNCTURE: CPT

## 2018-03-29 PROCEDURE — 85025 COMPLETE CBC W/AUTO DIFF WBC: CPT

## 2018-03-29 PROCEDURE — 93005 ELECTROCARDIOGRAM TRACING: CPT

## 2018-03-29 PROCEDURE — 85730 THROMBOPLASTIN TIME PARTIAL: CPT

## 2018-03-29 PROCEDURE — 80053 COMPREHEN METABOLIC PANEL: CPT

## 2018-03-29 PROCEDURE — 81003 URINALYSIS AUTO W/O SCOPE: CPT

## 2018-03-29 NOTE — DIAGNOSTIC IMAGING REPORT
Indication: Cough

 

Technique: 2 views of the chest

 

Comparison: 2/13/2018

 

Findings: Lungs and pleural spaces are clear. The heart size is upper limits normal. 

The bones are unremarkable. No significant interim change.

 

Impression: Negative

## 2018-04-04 VITALS — SYSTOLIC BLOOD PRESSURE: 128 MMHG | DIASTOLIC BLOOD PRESSURE: 68 MMHG

## 2018-04-04 VITALS — DIASTOLIC BLOOD PRESSURE: 65 MMHG | SYSTOLIC BLOOD PRESSURE: 125 MMHG

## 2018-04-04 VITALS — SYSTOLIC BLOOD PRESSURE: 119 MMHG | DIASTOLIC BLOOD PRESSURE: 69 MMHG

## 2018-04-04 VITALS — DIASTOLIC BLOOD PRESSURE: 78 MMHG | SYSTOLIC BLOOD PRESSURE: 138 MMHG

## 2018-04-04 VITALS — SYSTOLIC BLOOD PRESSURE: 135 MMHG | DIASTOLIC BLOOD PRESSURE: 75 MMHG

## 2018-04-04 VITALS — SYSTOLIC BLOOD PRESSURE: 129 MMHG | DIASTOLIC BLOOD PRESSURE: 64 MMHG

## 2018-04-04 VITALS — DIASTOLIC BLOOD PRESSURE: 73 MMHG | SYSTOLIC BLOOD PRESSURE: 133 MMHG

## 2018-04-04 VITALS — SYSTOLIC BLOOD PRESSURE: 137 MMHG | DIASTOLIC BLOOD PRESSURE: 56 MMHG

## 2018-04-04 VITALS — SYSTOLIC BLOOD PRESSURE: 127 MMHG | DIASTOLIC BLOOD PRESSURE: 67 MMHG

## 2018-04-04 VITALS — DIASTOLIC BLOOD PRESSURE: 76 MMHG | SYSTOLIC BLOOD PRESSURE: 136 MMHG

## 2018-04-04 VITALS — DIASTOLIC BLOOD PRESSURE: 73 MMHG | SYSTOLIC BLOOD PRESSURE: 136 MMHG

## 2018-04-04 VITALS — SYSTOLIC BLOOD PRESSURE: 156 MMHG | DIASTOLIC BLOOD PRESSURE: 78 MMHG

## 2018-04-04 VITALS — DIASTOLIC BLOOD PRESSURE: 75 MMHG | SYSTOLIC BLOOD PRESSURE: 145 MMHG

## 2018-04-04 VITALS — DIASTOLIC BLOOD PRESSURE: 72 MMHG | SYSTOLIC BLOOD PRESSURE: 140 MMHG

## 2018-04-04 PROCEDURE — 0TB04ZZ EXCISION OF RIGHT KIDNEY, PERCUTANEOUS ENDOSCOPIC APPROACH: ICD-10-PCS

## 2018-04-04 RX ADMIN — SODIUM CHLORIDE, SODIUM LACTATE, POTASSIUM CHLORIDE, AND CALCIUM CHLORIDE SCH MLS/HR: .6; .31; .03; .02 INJECTION, SOLUTION INTRAVENOUS at 13:46

## 2018-04-04 RX ADMIN — SODIUM CHLORIDE PRN MG: 9 INJECTION, SOLUTION INTRAVENOUS at 10:35

## 2018-04-04 RX ADMIN — HYDRALAZINE HYDROCHLORIDE SCH MG: 25 TABLET ORAL at 18:25

## 2018-04-04 RX ADMIN — FLUTICASONE PROPIONATE SCH SPRAY: 50 SPRAY, METERED NASAL at 18:25

## 2018-04-04 RX ADMIN — SODIUM CHLORIDE PRN MG: 9 INJECTION, SOLUTION INTRAVENOUS at 13:36

## 2018-04-04 RX ADMIN — SODIUM CHLORIDE SCH MLS/HR: 0.9 INJECTION INTRAVENOUS at 16:17

## 2018-04-04 RX ADMIN — DOCUSATE SODIUM SCH MG: 100 CAPSULE, LIQUID FILLED ORAL at 18:25

## 2018-04-04 RX ADMIN — SODIUM CHLORIDE, SODIUM LACTATE, POTASSIUM CHLORIDE, AND CALCIUM CHLORIDE SCH MLS/HR: .6; .31; .03; .02 INJECTION, SOLUTION INTRAVENOUS at 20:06

## 2018-04-04 RX ADMIN — SODIUM CHLORIDE PRN MG: 9 INJECTION, SOLUTION INTRAVENOUS at 16:58

## 2018-04-04 RX ADMIN — SODIUM CHLORIDE SCH MLS/HR: 0.9 INJECTION INTRAVENOUS at 22:56

## 2018-04-04 RX ADMIN — ATORVASTATIN CALCIUM SCH MG: 20 TABLET, FILM COATED ORAL at 20:06

## 2018-04-04 RX ADMIN — SODIUM CHLORIDE PRN MG: 9 INJECTION, SOLUTION INTRAVENOUS at 10:11

## 2018-04-04 RX ADMIN — SODIUM CHLORIDE PRN MG: 9 INJECTION, SOLUTION INTRAVENOUS at 22:20

## 2018-04-04 NOTE — PRE-PROCEDURE NOTE/ATTESTATION
Pre-Procedure Note/Attestation


Complete Prior to Procedure


Planned Procedure:  right


Procedure Narrative:


laparoscopic vs. open right partial nephrectomy, possible radical nephrectomy





Indications for Procedure


Pre-Operative Diagnosis:


right renal mass





Attestation


I attest that I discussed the nature of the procedure; its benefits; risks and 

complications; and alternatives (and the risks and benefits of such alternatives

), prior to the procedure, with the patient (or the patient's legal 

representative).





I attest that, if there was a reasonable possibility of needing a blood 

transfusion, the patient (or the patient's legal representative) was given the 

[facility] standardized written summary, pursuant 

to the David Crow Blood Safety Act ([place] Health and Safety Code # 1645, as 

amended).





I attest that I re-evaluated the patient just prior to the surgery and that 

there has been no change in the patient's H&P, except as documented below:











JEEVAN KENDALL Apr 4, 2018 07:29

## 2018-04-04 NOTE — IMMEDIATE POST-OP EVALUATION
Immediate Post-Op Evalulation


Immediate Post-Op Evalulation


Procedure:  Laparoscopic handassisted R nephrectomy


Date of Evaluation:  Apr 4, 2018


Time of Evaluation:  09:58


IV Fluids:  1200


Blood Products:  none


Estimated Blood Loss:  50


Urinary Output:  150


Blood Pressure Systolic:  116


Blood Pressure Diastolic:  72


Pulse Rate:  86


Respiratory Rate:  20


O2 Sat by Pulse Oximetry:  99


Temperature (Fahrenheit):  97.4


Pain Score (1-10):  2


Nausea:  No


Vomiting:  No


Complications


none


Patient Status:  reacts, patent, extubated, none


Hydration Status:  adequate











PETER ABAD M.D. Apr 4, 2018 11:13

## 2018-04-04 NOTE — ANETHESIA PREOPERATIVE EVAL
Anesthesia Pre-op PMH/ROS


General


Date of Evaluation:  Apr 4, 2018


Time of Evaluation:  07:20


Anesthesiologist:  Wilma


ASA Score:  ASA 3


Mallampati Score


Class I : Soft palate, uvula, fauces, pillars visible


Class II: Soft palate, uvula, fauces visible


Class III: Soft palate, base of uvula visible


Class IV: Only hard plate visible


Mallampati Classification:  Class II


Surgeon:  Alejandra


Diagnosis:  R renal mass


Surgical Procedure:  R laparoscopic nephrerctomy


Anesthesia History:  none


Social History:  smoking - h/o


Family History:  no anesthesia problems


Allergies:  


Coded Allergies:  


     TRAMADOL (Verified  Allergy, Intermediate, 4/4/18)


 TREMORS AND VOMITING


     CODEINE (Verified  Adverse Reaction, Mild, 1/25/13)


 nausea





Past Medical History


Cardiovascular:  Reports: HTN; 


   Denies: CAD, MI, valve dz, arrhythmia, other


Pulmonary:  Reports: COPD; 


   Denies: asthma, LOU, other


Gastrointestinal/Genitourinary:  Reports: GERD, CRI; 


   Denies: ESRD, other


Neurologic/Psychiatric:  Reports: depression/anxiety; 


   Denies: dementia, CVA, TIA, other


Endocrine:  Denies: DM, hypothyroidism, steroids, other


HEENT:  Denies: cataract (L), cataract (R), glaucoma, [place] (L), [place] (R), other


Hematology/Immune:  Denies: anemia, DVT, bleeding disorder, other


Musculoskeletal/Integumentary:  Reports: DJD; 


   Denies: OA, RA, DDD, edema, other


Other:  obesity


PMH Narrative:


as above


PSxH Narrative:


ovarian cyst removal





Anesthesia Pre-op Phys. Exam


Physician Exam





Last Vital Signs








  Date Time  Temp Pulse Resp B/P (MAP) Pulse Ox O2 Delivery O2 Flow Rate FiO2


 


4/4/18 07:17 97.3 80 20 137/56 99 Room Air  





 97.3       








Constitutional:  NAD


Neurologic:  CN 2-12 intact


Cardiovascular:  RRR, no M/R/G


Respiratory:  CTA


Gastrointestinal:  other - obesity





Airway Exam


Mallampati Score:  Class II


MO:  limited


Neck:  short


ROM:  limited


Teeth:  missing


Dentures:  no upper, no lower





Anesthesia Pre-op A/P


Labs


see chart





Studies


Pre-op Studies:  EKG - NSR





Risk Assessment & Plan


Assessment:


ASA 3


Plan:


GA with ETT


Status Change Before Surgery:  No





Pre-Antibiotics


Drug:  Anef 1gr.


Given Within 1 Hr of Incision:  Yes


Time Given:  08:50











PETER ABAD M.D. Apr 4, 2018 09:19

## 2018-04-04 NOTE — CARDIAC ELECTROPHYSIOLOGY PN
Subjective


Subjective


[id number]





Objective





Last 24 Hour Vital Signs








  Date Time  Temp Pulse Resp B/P (MAP) Pulse Ox O2 Delivery O2 Flow Rate FiO2


 


4/4/18 14:06 96.6       


 


4/4/18 13:36 96.6       


 


4/4/18 12:00     98 Nasal Cannula 3.0 


 


4/4/18 12:00 96.6 62 20 148/78 98   





 96.6       


 


4/4/18 12:00 97.3 51 18 135/75 98 Nasal Cannula 3.0 





 97.3       


 


4/4/18 11:45  52 17 138/78 97 Nasal Cannula 3.0 


 


4/4/18 11:30  58 14 133/73 95 Nasal Cannula 3.0 


 


4/4/18 11:15  54 17 145/75 96 Nasal Cannula 3.0 


 


4/4/18 11:13 207.3 86 20  99   


 


4/4/18 11:00  56 20 136/76 98 Nasal Cannula 3.0 


 


4/4/18 10:45  60 17 140/72 98 Nasal Cannula 3.0 


 


4/4/18 10:41 97.3       


 


4/4/18 10:35 97.3       


 


4/4/18 10:30 97.3 68 18 127/67 100 Nasal Cannula 3.0 





 97.3       


 


4/4/18 10:20  66 20 128/68 100 Nasal Cannula 3.0 


 


4/4/18 10:11 97.5       


 


4/4/18 10:10  73 15 125/65 100 Simple Mask 6.0 


 


4/4/18 10:00  67 18 129/64 100 Simple Mask 6.0 


 


4/4/18 09:54 97.8 73 20 119/69 100 Simple Mask 6.0 





 97.8       


 


4/4/18 07:17 97.3 80 20 137/56 99 Room Air  





 97.3       

















Intake and Output  


 


 4/3/18 4/4/18





 19:00 07:00


 


  


 


# Voids  1

















Mario Verma MD Apr 4, 2018 16:16

## 2018-04-04 NOTE — CONSULTATION
DATE OF CONSULTATION:  04/04/2018



CARDIOLOGY CONSULTATION



CONSULTING PHYSICIAN:  Mario Verma M.D.



REFERRING PHYSICIAN:  Destin Roberts M.D.



REASON FOR CONSULTATION:  Postoperative management in a patient with

history of hypertension and kidney surgery.



HISTORY OF PRESENT ILLNESS:  The patient is a very pleasant 72-year-old,

 lady with history of hypertension, hyperlipidemia, as

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

diagnosed September 2017.  I saw the patient in preoperative clearance in

04/03/2018.  The patient was admitted and underwent kidney surgery with

lysis of adhesions and right partial nephrectomy by Dr. Roberts today.

Cardiology consultation was requested for further followup and management.

It is of note, that the patient had a nuclear stress test on 12/22/2017

at [facility], which showed no evidence of ischemia.



REVIEW OF SYSTEMS:  Review of systems was performed and was negative other

than what was mentioned in the history of present illness.



PAST MEDICAL HISTORY:

1. Hypertension.

2. Hyperlipidemia.

3. History of right kidney mass.



FAMILY HISTORY:  Noncontributory.



SOCIAL HISTORY:  She lives at home.  Does not smoke or drink alcohol.



MEDICATIONS:

1. Triamterene-hydrochlorothiazide 37.5/25 mg daily.

2. Aspirin 81 mg daily.

3. Crestor 20 mg daily.

4. _____ 40 mg daily.



PHYSICAL EXAMINATION:

VITAL SIGNS:  Blood pressure of 135/75, pulse 62, respirations 18, and she

is afebrile.

HEAD AND NECK:  Showed no JVD.

LUNGS:  Clear.

CARDIOVASCULAR:  Shows regular S1 and S2 with no gallop or murmur.

 

ABDOMEN:  Soft and status post laparoscopy kidney surgery.

EXTREMITIES:  No pitting edema.



LABORATORY AND DIAGNOSTIC DATA:  Labs show white count of 5.8, hemoglobin

of 14.9, hematocrit 44, and platelets 269.  Sodium 140, potassium 3.2, BUN

of 25, creatinine 1.2.  INR is 0.9.



ASSESSMENT AND PLAN:

1. Hypertension.  Continue triamterene-hydrochlorothiazide as well as

_____ 40 mg daily.

2. Hyperlipidemia.  Resume Crestor 20 mg daily.

3. Right kidney 13 x 15 x 16 cm mass.  The patient underwent partial

nephrectomy by Dr. Roberts.  We will continue to follow the patient

clinically.



Thank you very much, Dr. Roberts, for allowing me to participate in the

care of this patient.  Please do not hesitate to contact me for any

questions regarding my evaluation.









  ______________________________________________

  Mario Verma M.D.





DR:  Destin

D:  04/04/2018 16:15

T:  04/04/2018 22:59

JOB#:  [id number]

CC:

## 2018-04-04 NOTE — UROLOGY PROGRESS NOTE
Assessment/Plan


Assessment/Plan


right renal mass


LUTS/DI hx





films reviewed


d/w pt fully


risks, etc


pt agreeable to blood transfusion if needed


plan partial nephrectomy, but pt understands radical nephrectomy may be 

necessary





Subjective


Allergies:  


Coded Allergies:  


     TRAMADOL (Verified  Allergy, Intermediate, 4/4/18)


 TREMORS AND VOMITING


     CODEINE (Verified  Adverse Reaction, Mild, 1/25/13)


 nausea


Subjective


for surg today





Objective





Last 24 Hour Vital Signs








  Date Time  Temp Pulse Resp B/P (MAP) Pulse Ox O2 Delivery O2 Flow Rate FiO2


 


4/4/18 07:17 97.3 80 20 137/56 99 Room Air  





 97.3       








Height (Feet):  5


Height (Inches):  4.00


Weight (Pounds):  185


Objective


 exam stable





preop labs and imaging noted











JEEVAN KENDALL Apr 4, 2018 07:32

## 2018-04-04 NOTE — BRIEF OPERATIVE NOTE
Immediate Post Operative Note


Operative Note


Pre-op Diagnosis:


right renal mass


Procedure:


lysis of adhesions, lap hand assisted, right partial nephrectomy


Post-op Diagnosis:  same as pre-op


Surgeon:  marnie haney


Anesthesiologist:  rogers


Anesthesia:  general


Specimen:  yes - right renal mass


Complications:  none


Condition:  stable


Fluids:  NS


Estimated Blood Loss:  minimal


Implant(s) used?:  No











JEEVAN HANEY Apr 4, 2018 09:44

## 2018-04-05 VITALS — DIASTOLIC BLOOD PRESSURE: 82 MMHG | SYSTOLIC BLOOD PRESSURE: 147 MMHG

## 2018-04-05 VITALS — DIASTOLIC BLOOD PRESSURE: 82 MMHG | SYSTOLIC BLOOD PRESSURE: 142 MMHG

## 2018-04-05 VITALS — SYSTOLIC BLOOD PRESSURE: 104 MMHG | DIASTOLIC BLOOD PRESSURE: 71 MMHG

## 2018-04-05 VITALS — DIASTOLIC BLOOD PRESSURE: 74 MMHG | SYSTOLIC BLOOD PRESSURE: 138 MMHG

## 2018-04-05 VITALS — SYSTOLIC BLOOD PRESSURE: 151 MMHG | DIASTOLIC BLOOD PRESSURE: 83 MMHG

## 2018-04-05 VITALS — SYSTOLIC BLOOD PRESSURE: 121 MMHG | DIASTOLIC BLOOD PRESSURE: 69 MMHG

## 2018-04-05 LAB
ADD MANUAL DIFF: NO
ANION GAP SERPL CALC-SCNC: 5 MMOL/L (ref 5–15)
BASOPHILS NFR BLD AUTO: 0.9 % (ref 0–2)
BUN SERPL-MCNC: 22 MG/DL (ref 7–18)
CALCIUM SERPL-MCNC: 8.6 MG/DL (ref 8.5–10.1)
CHLORIDE SERPL-SCNC: 105 MMOL/L (ref 98–107)
CO2 SERPL-SCNC: 30 MMOL/L (ref 21–32)
CREAT SERPL-MCNC: 1.5 MG/DL (ref 0.55–1.3)
EOSINOPHIL NFR BLD AUTO: 1.2 % (ref 0–3)
ERYTHROCYTE [DISTWIDTH] IN BLOOD BY AUTOMATED COUNT: 11.4 % (ref 11.6–14.8)
HCT VFR BLD CALC: 36.3 % (ref 37–47)
HGB BLD-MCNC: 11.9 G/DL (ref 12–16)
LYMPHOCYTES NFR BLD AUTO: 16.7 % (ref 20–45)
MCV RBC AUTO: 101 FL (ref 80–99)
MONOCYTES NFR BLD AUTO: 8.3 % (ref 1–10)
NEUTROPHILS NFR BLD AUTO: 72.9 % (ref 45–75)
PLATELET # BLD: 208 K/UL (ref 150–450)
POTASSIUM SERPL-SCNC: 3.6 MMOL/L (ref 3.5–5.1)
RBC # BLD AUTO: 3.58 M/UL (ref 4.2–5.4)
SODIUM SERPL-SCNC: 140 MMOL/L (ref 136–145)
WBC # BLD AUTO: 6.8 K/UL (ref 4.8–10.8)

## 2018-04-05 RX ADMIN — SODIUM CHLORIDE, SODIUM LACTATE, POTASSIUM CHLORIDE, AND CALCIUM CHLORIDE SCH MLS/HR: .6; .31; .03; .02 INJECTION, SOLUTION INTRAVENOUS at 05:53

## 2018-04-05 RX ADMIN — TRIAMTERENE AND HYDROCHLOROTHIAZIDE SCH CAP: 25; 37.5 CAPSULE ORAL at 09:33

## 2018-04-05 RX ADMIN — ATORVASTATIN CALCIUM SCH MG: 20 TABLET, FILM COATED ORAL at 22:17

## 2018-04-05 RX ADMIN — SODIUM CHLORIDE SCH MLS/HR: 0.9 INJECTION INTRAVENOUS at 17:36

## 2018-04-05 RX ADMIN — SODIUM CHLORIDE, SODIUM LACTATE, POTASSIUM CHLORIDE, AND CALCIUM CHLORIDE SCH MLS/HR: .6; .31; .03; .02 INJECTION, SOLUTION INTRAVENOUS at 17:38

## 2018-04-05 RX ADMIN — FLUTICASONE PROPIONATE SCH SPRAY: 50 SPRAY, METERED NASAL at 17:36

## 2018-04-05 RX ADMIN — DOCUSATE SODIUM SCH MG: 100 CAPSULE, LIQUID FILLED ORAL at 09:33

## 2018-04-05 RX ADMIN — HYDRALAZINE HYDROCHLORIDE SCH MG: 25 TABLET ORAL at 17:36

## 2018-04-05 RX ADMIN — CYCLOBENZAPRINE HYDROCHLORIDE SCH MG: 10 TABLET, FILM COATED ORAL at 09:32

## 2018-04-05 RX ADMIN — DULOXETINE HYDROCHLORIDE SCH MG: 30 CAPSULE, DELAYED RELEASE ORAL at 09:32

## 2018-04-05 RX ADMIN — SODIUM CHLORIDE PRN MG: 9 INJECTION, SOLUTION INTRAVENOUS at 02:55

## 2018-04-05 RX ADMIN — SODIUM CHLORIDE SCH MLS/HR: 0.9 INJECTION INTRAVENOUS at 05:52

## 2018-04-05 RX ADMIN — FLUTICASONE PROPIONATE SCH SPRAY: 50 SPRAY, METERED NASAL at 09:32

## 2018-04-05 RX ADMIN — SODIUM CHLORIDE, SODIUM LACTATE, POTASSIUM CHLORIDE, AND CALCIUM CHLORIDE SCH MLS/HR: .6; .31; .03; .02 INJECTION, SOLUTION INTRAVENOUS at 09:32

## 2018-04-05 RX ADMIN — FLUTICASONE FUROATE AND VILANTEROL TRIFENATATE SCH: 100; 25 POWDER RESPIRATORY (INHALATION) at 10:02

## 2018-04-05 RX ADMIN — SODIUM CHLORIDE PRN MG: 9 INJECTION, SOLUTION INTRAVENOUS at 09:25

## 2018-04-05 RX ADMIN — DILTIAZEM HYDROCHLORIDE SCH MG: 240 CAPSULE, EXTENDED RELEASE ORAL at 09:33

## 2018-04-05 RX ADMIN — SODIUM CHLORIDE PRN MG: 9 INJECTION, SOLUTION INTRAVENOUS at 13:02

## 2018-04-05 RX ADMIN — HYDRALAZINE HYDROCHLORIDE SCH MG: 25 TABLET ORAL at 09:33

## 2018-04-05 RX ADMIN — SODIUM CHLORIDE PRN MG: 9 INJECTION, SOLUTION INTRAVENOUS at 05:55

## 2018-04-05 RX ADMIN — HYDROCODONE BITARTRATE AND ACETAMINOPHEN PRN TAB: 5; 325 TABLET ORAL at 15:03

## 2018-04-05 RX ADMIN — DOCUSATE SODIUM SCH MG: 100 CAPSULE, LIQUID FILLED ORAL at 17:36

## 2018-04-05 NOTE — UROLOGY PROGRESS NOTE
Assessment/Plan


Assessment/Plan


right renal mass


LUTS/DI hx


POD # 1, lap right partial nephrectomy





doing well


clear liq diet


ambulate


will keep tracy for now


f/u on labs


d/w nursing staff





Subjective


Allergies:  


Coded Allergies:  


     TRAMADOL (Verified  Allergy, Intermediate, 4/4/18)


 TREMORS AND VOMITING


     CODEINE (Verified  Adverse Reaction, Mild, 1/25/13)


 nausea


Subjective


all noted, feels fair





Objective





Last 24 Hour Vital Signs








  Date Time  Temp Pulse Resp B/P (MAP) Pulse Ox O2 Delivery O2 Flow Rate FiO2


 


4/5/18 04:00 99.0 88 20 121/69 100 Nasal Cannula 2.0 





 99.0       


 


4/5/18 00:00 99.1 86 20 104/71 99 Nasal Cannula 2.0 





 99.1       


 


4/4/18 20:00 98.4 74 20 156/78 99 Nasal Cannula 2.0 





 98.4       


 


4/4/18 20:00 98.4 74 20 156/78 99 Nasal Cannula 3.0 





 98.4       


 


4/4/18 18:25    136/73    


 


4/4/18 17:28 97.0       


 


4/4/18 16:58 97.0       


 


4/4/18 16:00     97 Nasal Cannula 3.0 


 


4/4/18 16:00 97.0 66 19 136/73 97   





 97.0       


 


4/4/18 13:36 96.6       


 


4/4/18 12:00     98 Nasal Cannula 3.0 


 


4/4/18 12:00 96.6 62 20 148/78 98   





 96.6       


 


4/4/18 12:00 97.3 51 18 135/75 98 Nasal Cannula 3.0 





 97.3       


 


4/4/18 11:45  52 17 138/78 97 Nasal Cannula 3.0 


 


4/4/18 11:30  58 14 133/73 95 Nasal Cannula 3.0 


 


4/4/18 11:15  54 17 145/75 96 Nasal Cannula 3.0 


 


4/4/18 11:13 207.3 86 20  99   


 


4/4/18 11:00  56 20 136/76 98 Nasal Cannula 3.0 


 


4/4/18 10:45  60 17 140/72 98 Nasal Cannula 3.0 


 


4/4/18 10:41 97.3       


 


4/4/18 10:35 97.3       


 


4/4/18 10:30 97.3 68 18 127/67 100 Nasal Cannula 3.0 





 97.3       


 


4/4/18 10:20  66 20 128/68 100 Nasal Cannula 3.0 


 


4/4/18 10:11 97.5       


 


4/4/18 10:10  73 15 125/65 100 Simple Mask 6.0 


 


4/4/18 10:00  67 18 129/64 100 Simple Mask 6.0 


 


4/4/18 09:54 97.8 73 20 119/69 100 Simple Mask 6.0 





 97.8       

















Intake and Output  


 


 4/4/18 4/5/18





 19:00 07:00


 


Intake Total 530 ml 800 ml


 


Output Total 200 ml 180 ml


 


Balance 330 ml 620 ml


 


  


 


Intake Oral 0 ml 


 


IV Total 530 ml 800 ml


 


Output Urine Total 200 ml 180 ml








Laboratory Tests


4/5/18 06:25: 


White Blood Count [Pending], Red Blood Count [Pending], Hemoglobin [Pending], 

Hematocrit [Pending], Mean Corpuscular Volume [Pending], Mean Corpuscular 

Hemoglobin [Pending], Mean Corpuscular Hemoglobin Concent [Pending], Red Cell 

Distribution Width [Pending], Platelet Count [Pending], Mean Platelet Volume [

Pending], Neutrophils (%) (Auto) [Pending], Lymphocytes (%) (Auto) [Pending], 

Monocytes (%) (Auto) [Pending], Eosinophils (%) (Auto) [Pending], Basophils (%) 

(Auto) [Pending], Sodium Level [Pending], Potassium Level [Pending], Chloride 

Level [Pending], Carbon Dioxide Level [Pending], Blood Urea Nitrogen [Pending], 

Creatinine [Pending], Estimat Glomerular Filtration Rate [Pending], Glucose 

Level [Pending], Calcium Level [Pending]


Height (Feet):  5


Height (Inches):  4.00


Weight (Pounds):  185


Objective


abdomen soft, ND, urine is grossly yellow











BAMSHAD,JEEVAN Apr 5, 2018 07:31

## 2018-04-05 NOTE — 48 HOUR POST ANESTHESIA EVAL
Post Anesthesia Evaluation


Procedure:  Laparoscopic handassisted R nephrectomy


Date of Evaluation:  Apr 5, 2018


Time of Evaluation:  11:36


Blood Pressure Systolic:  142


0:  73


Pulse Rate:  68


Respiratory Rate:  22


Temperature (Fahrenheit):  97.6


O2 Sat by Pulse Oximetry:  98


Airway:  patent


Nausea:  No


Vomiting:  No


Pain Intensity:  3


Cardiopulmonary Status:


stable


Mental Status/LOC:  patient returned to baseline


Follow-up Care/Observations:


n/a


Post-Anesthesia Complications:


none


Follow-up care needed:  N/A











PETER ABAD M.D. Apr 5, 2018 11:37

## 2018-04-05 NOTE — OPERATIVE NOTE - DICTATED
DATE OF OPERATION:  04/04/2018



PREOPERATIVE DIAGNOSIS:  Right renal mass.



OPERATION:  Laparoscopic partial nephrectomy on the right side.



POSTOPERATIVE DIAGNOSIS:  Right renal mass.



:  Omer Jerome M.D.



CO-SURGEON:  Destin Roberts M.D.



FINDINGS:  A 3 centimeter renal mass in the right kidney.



INDICATION FOR SURGERY:  The patient was evaluated by Dr. Roberts and

underwent urologic workup that revealed a 2 centimeter mass in the right

kidney suspicious for renal cell carcinoma.  Dr. Roberts did all

preoperative evaluation, discussed with the patient surgical strategy and

she apparently agreed for right laparoscopic nephrectomy.  _____ to

perform the surgery with her.



PROCEDURE:  The patient was brought to the operating room, placed in the

right lateral decubital position.  Prepped and draped in standard fashion.

Under general anesthesia, a 7 centimeter incision was made at the

McBurney point and hand port was placed into the abdomen.  Three

additional trocars two 12 and one 5 was placed.  _____ was retracted

cephalad exposing the right renal fossa.  Tumor was seen on the anterior

aspect of the right kidney in the mid pole of the kidney.  Introitus

fascia was opened and tumor was mobilized from its surrounding fat.  Using

1 centimeter margin, tumor was excised with the elliptical scissors and

removed for pathologic examination.  Electrocautery coagulation plus

FloSeal and Surgicel, bleeding stopped and there was no recurrence of the

bleeding.  Dura of the fascia was then closed with an interrupted

figure-of-eight 2-0 Vicryl suture.  There was no evidence of acute

bleeding and inspection of the internal ring was normal and the sponge

count and instrument count was correct.  The wound was closed with

multiple layers, staples for the skin.  The patient tolerated the

procedure well.  No complication.









  ______________________________________________

  Omer Jerome M.D.





DR:  HERNAN

D:  04/05/2018 16:01

T:  04/05/2018 21:20

JOB#:  [id number]

CC:

## 2018-04-05 NOTE — OPERATIVE NOTE - DICTATED
DATE OF OPERATION:  04/04/2018



PREOPERATIVE DIAGNOSIS:  A 1.5 cm right solid renal mass.



POSTOPERATIVE DIAGNOSIS:  A 1.5 cm right solid renal mass.



PROCEDURE PERFORMED:  Laparoscopic hand assisted right partial nephrectomy

with lysis of adhesions.



OPERATING SURGEON:  Destin Roberts M.D.



CO-SURGEON:  Omer Jerome M.D.



ANESTHESIOLOGIST:  Pete Dillon M.D.



ANESTHESIA:  General.



INDICATION FOR PROCEDURE:  This is a pleasant 72-year-old 

female.  She has a history of 1.5 cm solid right renal mass that was

identified on a CT scan last year.  This was worrisome for renal cell

carcinoma.  The patient was evaluated and I had a long discussion with

her.  Multiple options were discussed with her including surveillance

versus surgical excision versus CT-guided cryoablation or radiofrequency

ablation.  Pros and cons of each were discussed, and the patient wanted to

have surgical excision and as such, she was scheduled for the above

procedure.  Possible risks and complications of bleeding, infection,

anesthesia, damage to the kidney, damage to internal organs, and possible

need for radical nephrectomy, etc., were discussed with the patient.  I

did tell her that she may need blood transfusion, which she accepted.  All

her questions were answered.  No guarantees were given or implied.  The

patient has had medical clearance for the surgery.



FINDINGS:  The patient had a solid renal mass in the right kidney.  This

was exophytic in the mid portion of the kidney that was excised with good

margins.



PROCEDURE IN DETAIL:  Informed consent was obtained from the patient.  The

patient was brought to the operating room and laid in supine position.

After successful general anesthesia was induced, the patient was then

placed in a decubitus position with her right flank exposed.   The flank

and abdomen were then prepped and draped in the usual sterile fashion.

Preoperative IV antibiotics were administered.  Time-out was performed.  A

small transverse incision was made in the right lower quadrant area

similar to McBurney's incision.  Fascia was opened, the muscle was split,

and the peritoneum was entered and a hand port was placed.  Two 12 mm

trocars were placed as well as a smaller 5 mm trocar.  The abdominal

cavity was insufflated with CO2.  At this point, from my part, she had

adhesions in the abdominal cavity, which were taken down and lysed using

Endoshears until we were able to identify the kidney for the excision of

the mass.  Refer to Dr. Jerome's operative report.



Basically the mass was excised from the kidney using cautery.  Good

margins were obtained.   Good hemostasis was obtained using FloSeal as

well as Surgicel.  There was minimal bleeding, and at the end of the

procedure, the gas was released.  No bleeding was noted.  Trocars were

removed.  The lower abdominal incision fascia was closed with running 0

Vicryl stitch, subcutaneous tissue was closed with 2-0 Vicryl, we

irrigated, and the skin was closed with staples.  The patient was awakened

and was taken recovery room in stable condition.  Blood loss was minimal.

No complications.









  ______________________________________________

  Destin Roberts M.D.





DR:  Jose R

D:  04/04/2018 20:20

T:  04/05/2018 01:11

JOB#:  [id number]

CC:  Jose Alberto Garcia M.D.; Fax#:  [phone]

BRYAN LOZANO M.D.  ; FAX#:  [phone]

GLORIA BLACKBURN M.D.; FAX#:  [phone]

## 2018-04-05 NOTE — CARDIAC ELECTROPHYSIOLOGY PN
Assessment/Plan


Assessment/Plan


1. Hypertension.  Continue Diazide, Cardizem  and Hydralazine 25 bid


2. Right kidney 13 x 15 x 16 cm mass.  S/P partial nephrectomy by Dr. Roberts. 


3. Hyperlipidemia on Lipitor





DW RN





Subjective


Subjective


Comfortable in NAD.





Objective





Last 24 Hour Vital Signs








  Date Time  Temp Pulse Resp B/P (MAP) Pulse Ox O2 Delivery O2 Flow Rate FiO2


 


4/5/18 16:00 98.2 82 20 151/83 99 Room Air  





 98.2       


 


4/5/18 11:51 98.1 87 20 147/82 98 Room Air  





 98.1       


 


4/5/18 11:37 207.7 68 22  98   


 


4/5/18 10:03  87 20  98 Nasal Cannula 2.0 28


 


4/5/18 10:02  88 20  98 Nasal Cannula 2.0 28


 


4/5/18 09:55 97.7       


 


4/5/18 09:55 97.7       


 


4/5/18 09:33    142/82    


 


4/5/18 09:33  91  142/82    


 


4/5/18 08:00 97.7 91 20 142/82 99 Room Air  





 97.7       


 


4/5/18 04:00 99.0 88 20 121/69 100 Nasal Cannula 2.0 





 99.0       


 


4/5/18 00:00 99.1 86 20 104/71 99 Nasal Cannula 2.0 





 99.1       


 


4/4/18 20:00 98.4 74 20 156/78 99 Nasal Cannula 2.0 





 98.4       


 


4/4/18 20:00 98.4 74 20 156/78 99 Nasal Cannula 3.0 





 98.4       


 


4/4/18 18:25    136/73    

















Intake and Output  


 


 4/4/18 4/5/18





 19:00 07:00


 


Intake Total 530 ml 800 ml


 


Output Total 200 ml 180 ml


 


Balance 330 ml 620 ml


 


  


 


Intake Oral 0 ml 


 


IV Total 530 ml 800 ml


 


Output Urine Total 200 ml 180 ml











Laboratory Tests








Test


  4/5/18


06:25


 


White Blood Count


  6.8 K/UL


(4.8-10.8)


 


Red Blood Count


  3.58 M/UL


(4.20-5.40)  L


 


Hemoglobin


  11.9 G/DL


(12.0-16.0)  L


 


Hematocrit


  36.3 %


(37.0-47.0)  L


 


Mean Corpuscular Volume


  101 FL (80-99)


H


 


Mean Corpuscular Hemoglobin


  33.2 PG


(27.0-31.0)  H


 


Mean Corpuscular Hemoglobin


Concent 32.7 G/DL


(32.0-36.0)


 


Red Cell Distribution Width


  11.4 %


(11.6-14.8)  L


 


Platelet Count


  208 K/UL


(150-450)


 


Mean Platelet Volume


  6.6 FL


(6.5-10.1)


 


Neutrophils (%) (Auto)


  72.9 %


(45.0-75.0)


 


Lymphocytes (%) (Auto)


  16.7 %


(20.0-45.0)  L


 


Monocytes (%) (Auto)


  8.3 %


(1.0-10.0)


 


Eosinophils (%) (Auto)


  1.2 %


(0.0-3.0)


 


Basophils (%) (Auto)


  0.9 %


(0.0-2.0)


 


Sodium Level


  140 MMOL/L


(136-145)


 


Potassium Level


  3.6 MMOL/L


(3.5-5.1)


 


Chloride Level


  105 MMOL/L


()


 


Carbon Dioxide Level


  30 MMOL/L


(21-32)


 


Anion Gap


  5 mmol/L


(5-15)


 


Blood Urea Nitrogen


  22 mg/dL


(7-18)  H


 


Creatinine


  1.5 MG/DL


(0.55-1.30)  H


 


Estimat Glomerular Filtration


Rate  mL/min (>60)  


 


 


Glucose Level


  131 MG/DL


()  H


 


Calcium Level


  8.6 MG/DL


(8.5-10.1)








Objective


HEAD AND NECK:  Showed no JVD.


LUNGS:  Clear.


CARDIOVASCULAR:  Shows regular S1 and S2 with no gallop or murmur.


ABDOMEN:  Soft and status post laparoscopy kidney surgery.


EXTREMITIES:  No pitting edema.











Mario Verma MD Apr 5, 2018 17:08

## 2018-04-05 NOTE — GENERAL PROGRESS NOTE
Assessment/Plan


Assessment/Plan


Assessment


- (R) RCCA


- s/p partial nephrectomy


- diverticulosis


- recurrent abd pain


- SIBO (+)





Recommendations


- post op care


- f/u path results


- diet per urology


- bowel regimen





Subjective


Allergies:  


Coded Allergies:  


     TRAMADOL (Verified  Allergy, Intermediate, 4/4/18)


 TREMORS AND VOMITING


     CODEINE (Verified  Adverse Reaction, Mild, 1/25/13)


 nausea


Subjective


POD #1


feels OK


some wound pain





Objective





Last 24 Hour Vital Signs








  Date Time  Temp Pulse Resp B/P (MAP) Pulse Ox O2 Delivery O2 Flow Rate FiO2


 


4/5/18 10:03  87 20  98 Nasal Cannula 2.0 28


 


4/5/18 10:02  88 20  98 Nasal Cannula 2.0 28


 


4/5/18 09:55 97.7       


 


4/5/18 09:55 97.7       


 


4/5/18 09:33    142/82    


 


4/5/18 09:33  91  142/82    


 


4/5/18 08:00 97.7 91 20 142/82 99 Room Air  





 97.7       


 


4/5/18 04:00 99.0 88 20 121/69 100 Nasal Cannula 2.0 





 99.0       


 


4/5/18 00:00 99.1 86 20 104/71 99 Nasal Cannula 2.0 





 99.1       


 


4/4/18 20:00 98.4 74 20 156/78 99 Nasal Cannula 2.0 





 98.4       


 


4/4/18 20:00 98.4 74 20 156/78 99 Nasal Cannula 3.0 





 98.4       


 


4/4/18 18:25    136/73    


 


4/4/18 16:58 97.0       


 


4/4/18 16:00     97 Nasal Cannula 3.0 


 


4/4/18 16:00 97.0 66 19 136/73 97   





 97.0       


 


4/4/18 13:36 96.6       


 


4/4/18 12:00     98 Nasal Cannula 3.0 


 


4/4/18 12:00 96.6 62 20 148/78 98   





 96.6       


 


4/4/18 12:00 97.3 51 18 135/75 98 Nasal Cannula 3.0 





 97.3       


 


4/4/18 11:45  52 17 138/78 97 Nasal Cannula 3.0 

















Intake and Output  


 


 4/4/18 4/5/18





 19:00 07:00


 


Intake Total 530 ml 800 ml


 


Output Total 200 ml 180 ml


 


Balance 330 ml 620 ml


 


  


 


Intake Oral 0 ml 


 


IV Total 530 ml 800 ml


 


Output Urine Total 200 ml 180 ml








Laboratory Tests


4/5/18 06:25: 


White Blood Count 6.8, Red Blood Count 3.58L, Hemoglobin 11.9L, Hematocrit 36.3L

, Mean Corpuscular Volume 101H, Mean Corpuscular Hemoglobin 33.2H, Mean 

Corpuscular Hemoglobin Concent 32.7, Red Cell Distribution Width 11.4L, 

Platelet Count 208, Mean Platelet Volume 6.6, Neutrophils (%) (Auto) 72.9, 

Lymphocytes (%) (Auto) 16.7L, Monocytes (%) (Auto) 8.3, Eosinophils (%) (Auto) 

1.2, Basophils (%) (Auto) 0.9, Sodium Level 140, Potassium Level 3.6, Chloride 

Level 105, Carbon Dioxide Level 30, Anion Gap 5, Blood Urea Nitrogen 22H, 

Creatinine 1.5H, Estimat Glomerular Filtration Rate , Glucose Level 131H, 

Calcium Level 8.6


Height (Feet):  5


Height (Inches):  4.00


Weight (Pounds):  185


Objective


WDWN AA woman


NCAT


supple


CTA


RRR


Abd obese, distended wounds OK


no edema











GLORIA BLACKBURN Apr 5, 2018 11:33

## 2018-04-05 NOTE — CONSULTATION
DATE OF CONSULTATION:  04/04/2018



GASTROENTEROLOGY CONSULTATION



CONSULTING PHYSICIAN:  Blanca Couch M.D.



CHIEF COMPLAINT:  I was asked to see this patient by Dr. Garcia and Dr. Roberts for evaluation of abdominal problems.



HISTORY OF PRESENT ILLNESS:  The patient is a plesant  woman, 
who

was admitted for a partial right nephrectomy today.  The patient has had a

longstanding history of various abdominal complaints, which should date

back to approximately 4 or 5 years.  She notes periodic abdominal pain

predominantly on her left side in the lower abdomen.  She had an endoscopy

and colonoscopy in 2013.  The upper endoscopy was normal and colonoscopy

showed diverticulosis, which is more on the left side.  Subsequently, the

patient had an endoscopy and an attempt at colonoscopy in 2015.  The

endoscopy was unremarkable, but the colonoscopy was unsuccessful due to

significant sigmoid wall narrowing and diverticular distortion.  The

patient has been admitted to [facility] for presumed

diverticulitis and usually recovers with antibiotics.  Her CT scans,

however, are not always reflected on the diverticulitis, which was

diagnosed clinically.  She has had a hydrogen breath test, which was

positive for bacterial overgrowth.  The methane line however was normal

and only the hydrogen line was abnormal.  Colonic biopsies on one

colonoscopy in the descending colon showed focal active colitis.  She

underwent another colonoscopy in 2016 in April showing again

diverticulosis, which was more severe on the left side.  There was a polyp

that was removed, which was inflammatory.  The patient's attacks meanwhile

have become generally more frequent.  The attacks last about 3 to 4 days

and are combined with nausea and vomiting.  Because of the questionable

diagnosis of a family history of Crohn's disease in the patient's father,

the patient underwent an MR enterography at [facility] in

September 2017, which showed diverticulosis, but there was no evidence of

small bowel Crohn's disease.  Incidentally, on this MR, however, a 1.5 cm

right renal lesion was seen where a diagnosis of renal cell carcinoma was

made, which was resected today.  The patient also has undergone a wireless

capsule endoscopy in September 2017 showing no significant evidence of

Crohn's disease, although there was rapid transit of the capsule with

suboptimal small bowel preparation.  The proximal small bowel was noted to

be apparently edematous, but no other lesions were reported.  The patient

was seen by me on the day of his evaluation postoperatively in the

recovery area.  She was still somewhat sedated, but arousable and was

mainly complaining of typical operative wound pain.



PAST MEDICAL HISTORY:  History of hypertension, obesity, diverticulosis,

elevated cholesterol, small intestinal bacterial overgrowth per hydrogen

breath test, chronic constipation, reactive airway disease, and

right-sided renal cell carcinoma.



FAMILY HISTORY:  Positive for questionable history of Crohn's disease in

the father.



SOCIAL HISTORY:  The patient is single.  She does not smoke or drink.  She

has a twin sister, who is very much involved in her daily life.



MEDICATIONS:  Please see the chart list for details.



ALLERGIES:  Codeine and tramadol.



REVIEW OF SYSTEMS:  Otherwise negative.



PHYSICAL EXAMINATION:

GENERAL:  A pleasant, obese,  woman, seen in recovery

room.

HEENT:  Normocephalic and atraumatic.  Sclerae anicteric.  Oropharynx

clear.

NECK:  Supple.

CHEST:  Clear to auscultation.

CARDIOVASCULAR:  Regular rate.

ABDOMEN:  Soft, mildly distended with fresh surgical wounds.

EXTREMITIES:  No edema.

NEUROLOGIC:  Deferred.



LABORATORY DATA:  Reviewed.



ASSESSMENT:  This patient presents with recurrent abdominal pain for the

past few years with negative extensive workup, although some features are

still apparent.  The patient does have diverticulosis and also has small

intestinal bacterial overgrowth.  It is possible that her attacks may be

related to her bacterial overgrowth and that the antibiotics treat this

entity.  Another possibility is recurrent diverticulitis, which may or may

not be detectable on imaging studies, but will respond to antibiotics.

Since the patient does respond and usually recovers uneventfully, she can

be managed conservatively.  Another possibly would be a functional bowel

disease, which can be treated with agents like Amitiza, but sudden onset

of attack is somewhat unusual.  For the time being, however, I would

concentrate on recovery from her surgery.  She will need to be treated

with a bowel regimen to avoid constipation with postoperative narcotics.

Early ambulation should be encouraged and the patient should receive deep

vein thrombosis prophylaxis.



RECOMMENDATIONS:  Per above discussion and per orders written in the

chart.



Thank you for asking me to participate in the care of this patient.









  ______________________________________________

  Blanca Couch M.D.





DR:  PK/PM

D:  04/05/2018 08:15

T:  04/05/2018 18:36

JOB#:  [id number]

CC:



MELANY

## 2018-04-06 VITALS — DIASTOLIC BLOOD PRESSURE: 40 MMHG | SYSTOLIC BLOOD PRESSURE: 106 MMHG

## 2018-04-06 VITALS — DIASTOLIC BLOOD PRESSURE: 65 MMHG | SYSTOLIC BLOOD PRESSURE: 114 MMHG

## 2018-04-06 VITALS — SYSTOLIC BLOOD PRESSURE: 135 MMHG | DIASTOLIC BLOOD PRESSURE: 74 MMHG

## 2018-04-06 VITALS — SYSTOLIC BLOOD PRESSURE: 143 MMHG | DIASTOLIC BLOOD PRESSURE: 77 MMHG

## 2018-04-06 VITALS — SYSTOLIC BLOOD PRESSURE: 130 MMHG | DIASTOLIC BLOOD PRESSURE: 61 MMHG

## 2018-04-06 VITALS — DIASTOLIC BLOOD PRESSURE: 66 MMHG | SYSTOLIC BLOOD PRESSURE: 132 MMHG

## 2018-04-06 LAB
ADD MANUAL DIFF: NO
ANION GAP SERPL CALC-SCNC: 3 MMOL/L (ref 5–15)
BASOPHILS NFR BLD AUTO: 0.8 % (ref 0–2)
BUN SERPL-MCNC: 18 MG/DL (ref 7–18)
CALCIUM SERPL-MCNC: 8.5 MG/DL (ref 8.5–10.1)
CHLORIDE SERPL-SCNC: 100 MMOL/L (ref 98–107)
CO2 SERPL-SCNC: 30 MMOL/L (ref 21–32)
CREAT SERPL-MCNC: 1.3 MG/DL (ref 0.55–1.3)
EOSINOPHIL NFR BLD AUTO: 1 % (ref 0–3)
ERYTHROCYTE [DISTWIDTH] IN BLOOD BY AUTOMATED COUNT: 11 % (ref 11.6–14.8)
HCT VFR BLD CALC: 31.9 % (ref 37–47)
HGB BLD-MCNC: 10.7 G/DL (ref 12–16)
LYMPHOCYTES NFR BLD AUTO: 10.6 % (ref 20–45)
MCV RBC AUTO: 99 FL (ref 80–99)
MONOCYTES NFR BLD AUTO: 9.9 % (ref 1–10)
NEUTROPHILS NFR BLD AUTO: 77.7 % (ref 45–75)
PLATELET # BLD: 194 K/UL (ref 150–450)
POTASSIUM SERPL-SCNC: 3.4 MMOL/L (ref 3.5–5.1)
RBC # BLD AUTO: 3.22 M/UL (ref 4.2–5.4)
SODIUM SERPL-SCNC: 133 MMOL/L (ref 136–145)
WBC # BLD AUTO: 7.3 K/UL (ref 4.8–10.8)

## 2018-04-06 RX ADMIN — DILTIAZEM HYDROCHLORIDE SCH MG: 240 CAPSULE, EXTENDED RELEASE ORAL at 09:00

## 2018-04-06 RX ADMIN — FLUTICASONE FUROATE AND VILANTEROL TRIFENATATE SCH: 100; 25 POWDER RESPIRATORY (INHALATION) at 10:05

## 2018-04-06 RX ADMIN — HYDRALAZINE HYDROCHLORIDE SCH MG: 25 TABLET ORAL at 18:00

## 2018-04-06 RX ADMIN — DULOXETINE HYDROCHLORIDE SCH MG: 30 CAPSULE, DELAYED RELEASE ORAL at 09:33

## 2018-04-06 RX ADMIN — ATORVASTATIN CALCIUM SCH MG: 20 TABLET, FILM COATED ORAL at 20:42

## 2018-04-06 RX ADMIN — HYDRALAZINE HYDROCHLORIDE SCH MG: 25 TABLET ORAL at 09:00

## 2018-04-06 RX ADMIN — HYDROCODONE BITARTRATE AND ACETAMINOPHEN PRN TAB: 5; 325 TABLET ORAL at 05:13

## 2018-04-06 RX ADMIN — SODIUM CHLORIDE, SODIUM LACTATE, POTASSIUM CHLORIDE, AND CALCIUM CHLORIDE SCH MLS/HR: .6; .31; .03; .02 INJECTION, SOLUTION INTRAVENOUS at 03:00

## 2018-04-06 RX ADMIN — SODIUM CHLORIDE SCH MLS/HR: 0.9 INJECTION INTRAVENOUS at 05:13

## 2018-04-06 RX ADMIN — CYCLOBENZAPRINE HYDROCHLORIDE SCH MG: 10 TABLET, FILM COATED ORAL at 09:33

## 2018-04-06 RX ADMIN — SODIUM CHLORIDE SCH MLS/HR: 0.9 INJECTION INTRAVENOUS at 17:58

## 2018-04-06 RX ADMIN — DOCUSATE SODIUM SCH MG: 100 CAPSULE, LIQUID FILLED ORAL at 09:32

## 2018-04-06 RX ADMIN — SODIUM CHLORIDE, SODIUM LACTATE, POTASSIUM CHLORIDE, AND CALCIUM CHLORIDE SCH MLS/HR: .6; .31; .03; .02 INJECTION, SOLUTION INTRAVENOUS at 11:07

## 2018-04-06 RX ADMIN — DOCUSATE SODIUM SCH MG: 100 CAPSULE, LIQUID FILLED ORAL at 18:00

## 2018-04-06 RX ADMIN — FLUTICASONE PROPIONATE SCH SPRAY: 50 SPRAY, METERED NASAL at 09:38

## 2018-04-06 RX ADMIN — FLUTICASONE PROPIONATE SCH SPRAY: 50 SPRAY, METERED NASAL at 17:58

## 2018-04-06 RX ADMIN — SODIUM CHLORIDE, SODIUM LACTATE, POTASSIUM CHLORIDE, AND CALCIUM CHLORIDE SCH MLS/HR: .6; .31; .03; .02 INJECTION, SOLUTION INTRAVENOUS at 17:57

## 2018-04-06 RX ADMIN — TRIAMTERENE AND HYDROCHLOROTHIAZIDE SCH CAP: 25; 37.5 CAPSULE ORAL at 09:00

## 2018-04-06 RX ADMIN — HYDROCODONE BITARTRATE AND ACETAMINOPHEN PRN TAB: 5; 325 TABLET ORAL at 18:01

## 2018-04-06 NOTE — UROLOGY PROGRESS NOTE
Assessment/Plan


Assessment/Plan


right renal mass


LUTS/DI hx


POD # 2, lap right partial nephrectomy


mild DANILO





doing well


advance diet


ambulate


dc tracy


monitor labs


f/u on path


d/w nursing staff





Subjective


Allergies:  


Coded Allergies:  


     TRAMADOL (Verified  Allergy, Intermediate, 4/4/18)


 TREMORS AND VOMITING


     CODEINE (Verified  Adverse Reaction, Mild, 1/25/13)


 nausea


Subjective


all noted, feels fair, took clears yest, had flatus





Objective





Last 24 Hour Vital Signs








  Date Time  Temp Pulse Resp B/P (MAP) Pulse Ox O2 Delivery O2 Flow Rate FiO2


 


4/6/18 04:00 98.6 88 17 135/74 100 Nasal Cannula 2.0 





 98.6       


 


4/6/18 00:00 98.2 87 18 143/77 96 Nasal Cannula 2.0 





 98.2       


 


4/5/18 20:00 99.7 84 17 138/74 98 Nasal Cannula 2.0 





 99.7       


 


4/5/18 17:36    145/80    


 


4/5/18 16:00 98.2 82 20 151/83 99 Room Air  





 98.2       


 


4/5/18 11:51 98.1 87 20 147/82 98 Room Air  





 98.1       


 


4/5/18 11:37 207.7 68 22  98   


 


4/5/18 10:03  87 20  98 Nasal Cannula 2.0 28


 


4/5/18 10:02  88 20  98 Nasal Cannula 2.0 28


 


4/5/18 09:55 97.7       


 


4/5/18 09:55 97.7       


 


4/5/18 09:33    142/82    


 


4/5/18 09:33  91  142/82    


 


4/5/18 08:00 97.7 91 20 142/82 99 Room Air  





 97.7       

















Intake and Output  


 


 4/5/18 4/6/18





 19:00 07:00


 


Intake Total 1575 ml 1550 ml


 


Output Total 800 ml 550 ml


 


Balance 775 ml 1000 ml


 


  


 


Intake Oral 490 ml 300 ml


 


IV Total 1085 ml 1250 ml


 


Output Urine Total 800 ml 550 ml


 


# Voids 1 








Laboratory Tests


4/6/18 06:20: 


White Blood Count 7.3, Red Blood Count 3.22L, Hemoglobin 10.7L, Hematocrit 31.9L

, Mean Corpuscular Volume 99, Mean Corpuscular Hemoglobin 33.3H, Mean 

Corpuscular Hemoglobin Concent 33.6, Red Cell Distribution Width 11.0L, 

Platelet Count 194, Mean Platelet Volume 6.6, Neutrophils (%) (Auto) 77.7H, 

Lymphocytes (%) (Auto) 10.6L, Monocytes (%) (Auto) 9.9, Eosinophils (%) (Auto) 

1.0, Basophils (%) (Auto) 0.8, Sodium Level [Pending], Potassium Level [Pending]

, Chloride Level [Pending], Carbon Dioxide Level [Pending], Blood Urea Nitrogen 

[Pending], Creatinine [Pending], Estimat Glomerular Filtration Rate [Pending], 

Glucose Level [Pending], Calcium Level [Pending]


Height (Feet):  5


Height (Inches):  4.00


Weight (Pounds):  185


Objective


abdomen soft, ND, urine is grossly yellow











BAMSHADJEEVAN Apr 6, 2018 07:29

## 2018-04-06 NOTE — GENERAL PROGRESS NOTE
Assessment/Plan


Assessment/Plan


Assessment


- (R) RCCA


- s/p partial nephrectomy


- diverticulosis


- recurrent abd pain


- SIBO (+)





Recommendations


- post op care


- f/u path results


- diet per urology


- bowel regimen


- OOB





Subjective


Allergies:  


Coded Allergies:  


     TRAMADOL (Verified  Allergy, Intermediate, 4/4/18)


 TREMORS AND VOMITING


     CODEINE (Verified  Adverse Reaction, Mild, 1/25/13)


 nausea


Subjective


POD #2


feels OK


some wound pain


still bloated


(+) flatus


(-) BM





Objective





Last 24 Hour Vital Signs








  Date Time  Temp Pulse Resp B/P (MAP) Pulse Ox O2 Delivery O2 Flow Rate FiO2


 


4/6/18 18:00    130/61    


 


4/6/18 16:00 98.4 77 20 130/61 99 Nasal Cannula 2.0 





 98.4       


 


4/6/18 12:00 97.9 74 20 114/65 98 Nasal Cannula 2.0 





 97.9       


 


4/6/18 10:09  71 20  93 Nasal Cannula 3.0 


 


4/6/18 10:05  70 18  94 Nasal Cannula 3.0 


 


4/6/18 09:00    104/40    


 


4/6/18 09:00  67  106/40    


 


4/6/18 08:00 97.8 67 20 106/40 96 Nasal Cannula 2.0 





 97.8       


 


4/6/18 04:00 98.6 88 17 135/74 100 Nasal Cannula 2.0 





 98.6       


 


4/6/18 00:00 98.2 87 18 143/77 96 Nasal Cannula 2.0 





 98.2       

















Intake and Output  


 


 4/5/18 4/6/18





 19:00 07:00


 


Intake Total 1575 ml 1550 ml


 


Output Total 800 ml 550 ml


 


Balance 775 ml 1000 ml


 


  


 


Intake Oral 490 ml 300 ml


 


IV Total 1085 ml 1250 ml


 


Output Urine Total 800 ml 550 ml


 


# Voids 1 








Laboratory Tests


4/6/18 06:20: 


White Blood Count 7.3, Red Blood Count 3.22L, Hemoglobin 10.7L, Hematocrit 31.9L

, Mean Corpuscular Volume 99, Mean Corpuscular Hemoglobin 33.3H, Mean 

Corpuscular Hemoglobin Concent 33.6, Red Cell Distribution Width 11.0L, 

Platelet Count 194, Mean Platelet Volume 6.6, Neutrophils (%) (Auto) 77.7H, 

Lymphocytes (%) (Auto) 10.6L, Monocytes (%) (Auto) 9.9, Eosinophils (%) (Auto) 

1.0, Basophils (%) (Auto) 0.8, Sodium Level 133L, Potassium Level 3.4L, 

Chloride Level 100, Carbon Dioxide Level 30, Anion Gap 3L, Blood Urea Nitrogen 

18, Creatinine 1.3, Estimat Glomerular Filtration Rate , Glucose Level 118H, 

Calcium Level 8.5


Height (Feet):  5


Height (Inches):  4.00


Weight (Pounds):  185


Objective


WDWN AA woman


NCAT


supple


CTA


RRR


Abd obese, distended wounds OK


no edema











GLORIA BLACKBURN Apr 6, 2018 21:32

## 2018-04-07 VITALS — DIASTOLIC BLOOD PRESSURE: 70 MMHG | SYSTOLIC BLOOD PRESSURE: 140 MMHG

## 2018-04-07 VITALS — SYSTOLIC BLOOD PRESSURE: 130 MMHG | DIASTOLIC BLOOD PRESSURE: 75 MMHG

## 2018-04-07 VITALS — SYSTOLIC BLOOD PRESSURE: 143 MMHG | DIASTOLIC BLOOD PRESSURE: 74 MMHG

## 2018-04-07 VITALS — SYSTOLIC BLOOD PRESSURE: 157 MMHG | DIASTOLIC BLOOD PRESSURE: 78 MMHG

## 2018-04-07 VITALS — DIASTOLIC BLOOD PRESSURE: 66 MMHG | SYSTOLIC BLOOD PRESSURE: 140 MMHG

## 2018-04-07 VITALS — DIASTOLIC BLOOD PRESSURE: 73 MMHG | SYSTOLIC BLOOD PRESSURE: 129 MMHG

## 2018-04-07 LAB
ADD MANUAL DIFF: NO
BASOPHILS NFR BLD AUTO: 1 % (ref 0–2)
EOSINOPHIL NFR BLD AUTO: 2.4 % (ref 0–3)
ERYTHROCYTE [DISTWIDTH] IN BLOOD BY AUTOMATED COUNT: 11 % (ref 11.6–14.8)
HCT VFR BLD CALC: 30.9 % (ref 37–47)
HGB BLD-MCNC: 10.6 G/DL (ref 12–16)
LYMPHOCYTES NFR BLD AUTO: 20 % (ref 20–45)
MCV RBC AUTO: 100 FL (ref 80–99)
MONOCYTES NFR BLD AUTO: 12.5 % (ref 1–10)
NEUTROPHILS NFR BLD AUTO: 64.2 % (ref 45–75)
PLATELET # BLD: 182 K/UL (ref 150–450)
RBC # BLD AUTO: 3.09 M/UL (ref 4.2–5.4)
WBC # BLD AUTO: 4.8 K/UL (ref 4.8–10.8)

## 2018-04-07 RX ADMIN — DULOXETINE HYDROCHLORIDE SCH MG: 30 CAPSULE, DELAYED RELEASE ORAL at 08:36

## 2018-04-07 RX ADMIN — TRIAMTERENE AND HYDROCHLOROTHIAZIDE SCH CAP: 25; 37.5 CAPSULE ORAL at 08:36

## 2018-04-07 RX ADMIN — HYDROCODONE BITARTRATE AND ACETAMINOPHEN PRN TAB: 5; 325 TABLET ORAL at 09:45

## 2018-04-07 RX ADMIN — FLUTICASONE PROPIONATE SCH SPRAY: 50 SPRAY, METERED NASAL at 17:49

## 2018-04-07 RX ADMIN — FLUTICASONE FUROATE AND VILANTEROL TRIFENATATE SCH: 100; 25 POWDER RESPIRATORY (INHALATION) at 09:55

## 2018-04-07 RX ADMIN — FLUTICASONE PROPIONATE SCH SPRAY: 50 SPRAY, METERED NASAL at 08:45

## 2018-04-07 RX ADMIN — SODIUM CHLORIDE, SODIUM LACTATE, POTASSIUM CHLORIDE, AND CALCIUM CHLORIDE SCH MLS/HR: .6; .31; .03; .02 INJECTION, SOLUTION INTRAVENOUS at 17:41

## 2018-04-07 RX ADMIN — SODIUM CHLORIDE, SODIUM LACTATE, POTASSIUM CHLORIDE, AND CALCIUM CHLORIDE SCH MLS/HR: .6; .31; .03; .02 INJECTION, SOLUTION INTRAVENOUS at 09:45

## 2018-04-07 RX ADMIN — SODIUM CHLORIDE SCH MLS/HR: 0.9 INJECTION INTRAVENOUS at 17:41

## 2018-04-07 RX ADMIN — CYCLOBENZAPRINE HYDROCHLORIDE SCH MG: 10 TABLET, FILM COATED ORAL at 08:37

## 2018-04-07 RX ADMIN — DOCUSATE SODIUM SCH MG: 100 CAPSULE, LIQUID FILLED ORAL at 08:36

## 2018-04-07 RX ADMIN — DILTIAZEM HYDROCHLORIDE SCH MG: 240 CAPSULE, EXTENDED RELEASE ORAL at 08:36

## 2018-04-07 RX ADMIN — HYDROCODONE BITARTRATE AND ACETAMINOPHEN PRN TAB: 5; 325 TABLET ORAL at 19:45

## 2018-04-07 RX ADMIN — HYDROCODONE BITARTRATE AND ACETAMINOPHEN PRN TAB: 5; 325 TABLET ORAL at 00:58

## 2018-04-07 RX ADMIN — HYDRALAZINE HYDROCHLORIDE SCH MG: 25 TABLET ORAL at 17:41

## 2018-04-07 RX ADMIN — SODIUM CHLORIDE, SODIUM LACTATE, POTASSIUM CHLORIDE, AND CALCIUM CHLORIDE SCH MLS/HR: .6; .31; .03; .02 INJECTION, SOLUTION INTRAVENOUS at 01:57

## 2018-04-07 RX ADMIN — DOCUSATE SODIUM SCH MG: 100 CAPSULE, LIQUID FILLED ORAL at 17:40

## 2018-04-07 RX ADMIN — ATORVASTATIN CALCIUM SCH MG: 20 TABLET, FILM COATED ORAL at 20:32

## 2018-04-07 RX ADMIN — SODIUM CHLORIDE SCH MLS/HR: 0.9 INJECTION INTRAVENOUS at 05:16

## 2018-04-07 RX ADMIN — HYDRALAZINE HYDROCHLORIDE SCH MG: 25 TABLET ORAL at 08:37

## 2018-04-07 NOTE — UROLOGY PROGRESS NOTE
Assessment/Plan


Assessment/Plan


right renal mass


LUTS/DI hx


POD # 3, lap right partial nephrectomy


mild DANILO, improved





doing well


dulcolax supp


dc planning


f/u on path


d/w nursing staff





Subjective


Allergies:  


Coded Allergies:  


     TRAMADOL (Verified  Allergy, Intermediate, 4/4/18)


 TREMORS AND VOMITING


     CODEINE (Verified  Adverse Reaction, Mild, 1/25/13)


 nausea


Subjective


all noted, feels fair, voided, taking po's





Objective





Last 24 Hour Vital Signs








  Date Time  Temp Pulse Resp B/P (MAP) Pulse Ox O2 Delivery O2 Flow Rate FiO2


 


4/7/18 12:00 97.7 65 19 130/75 99 Nasal Cannula 2.0 





 97.7       


 


4/7/18 09:57  83 20  94 Nasal Cannula 3.0 


 


4/7/18 09:55  83 20  93 Nasal Cannula 3.0 


 


4/7/18 08:37    129/73    


 


4/7/18 08:36  76  129/73    


 


4/7/18 08:00 97.4 76 19 129/73 100 Nasal Cannula 2.0 





 97.4       


 


4/7/18 04:00 97.9 74 19 140/70 99 Nasal Cannula 2.0 





 97.9       


 


4/7/18 00:00 98.2 75 18 143/74 99 Nasal Cannula 2.0 





 98.2       


 


4/6/18 20:00 99.0 79 18 132/66 100 Nasal Cannula 2.0 





 99.0       


 


4/6/18 18:00    130/61    


 


4/6/18 16:00 98.4 77 20 130/61 99 Nasal Cannula 2.0 





 98.4       

















Intake and Output  


 


 4/6/18 4/7/18





 19:00 07:00


 


Intake Total 2185 ml 1125 ml


 


Balance 2185 ml 1125 ml


 


  


 


Intake Oral 700 ml 


 


IV Total 1485 ml 1125 ml


 


# Voids  1











Microbiology








 Date/Time


Source Procedure


Growth Status


 


 


 4/4/18 07:00


Nasal Nares MRSA Culture - Final


NO METHICILLIN RESISTANT STAPH AUREUS... Complete








Current Medications








 Medications


  (Trade)  Dose


 Ordered  Sig/George


 Route


 PRN Reason  Start Time


 Stop Time Status Last Admin


Dose Admin


 


 Acetaminophen/


 Hydrocodone Bitart


  (Norco 5/325)  1 tab  Q4H  PRN


 ORAL


 Moderate Pain (Pain Scale 4-6)  4/5/18 14:45


 4/12/18 14:44  4/5/18 22:17


 


 


 Acetaminophen/


 Hydrocodone Bitart


  (Norco 5/325)  2 tab  Q4H  PRN


 ORAL


 Severe Pain (Pain Scale 7-10)  4/5/18 14:45


 4/12/18 14:44  4/7/18 09:45


 


 


 Albuterol Sulfate


  (Proventil MDI)  2 puff  Q4H  PRN


 INH


 Shortness of Breath  4/4/18 13:45


 5/4/18 13:44   


 


 


 Atorvastatin


 Calcium


  (Lipitor)  20 mg  BEDTIME


 ORAL


   4/4/18 21:00


 5/4/18 20:59  4/6/18 20:42


 


 


 Cefazolin Sodium


 1 gm/Dextrose  110 ml @ 


 220 mls/hr  Q12H


 IVPB


   4/5/18 18:00


 4/12/18 17:59  4/7/18 05:16


 


 


 Cyclobenzaprine


 HCl


  (Flexeril)  5 mg  DAILY


 ORAL


   4/5/18 09:00


 5/5/18 08:59  4/7/18 08:37


 


 


 Diazepam


  (Valium)  5 mg  BIDPRN  PRN


 ORAL


 For Anxiety  4/4/18 13:45


 4/11/18 13:44   


 


 


 Diltiazem HCl


  (Cardizem CD)  240 mg  DAILY


 ORAL


   4/5/18 09:00


 5/5/18 08:59  4/7/18 08:36


 


 


 Docusate Sodium


  (Colace)  100 mg  TWICE A  DAY


 ORAL


   4/4/18 18:00


 5/4/18 17:59  4/7/18 08:36


 


 


 Duloxetine HCl


  (Cymbalta)  30 mg  DAILY


 ORAL


   4/5/18 09:00


 5/5/18 08:59  4/7/18 08:36


 


 


 Fluticasone


 Propionate


  (Flonase)  2 spray  BID


 NASAL


   4/4/18 18:00


 5/4/18 17:59  4/7/18 08:45


 


 


 Fluticasone/


 Vilanterol


  (Breo Ellipta


 100/25)  1  DAILY


 INH


   4/5/18 09:00


 5/5/18 08:59  4/7/18 09:55


 


 


 Hydralazine HCl


  (Apresoline)  25 mg  BID


 ORAL


   4/4/18 18:00


 5/4/18 17:59  4/7/18 08:37


 


 


 Lactated Ringer's  1,000 ml @ 


 125 mls/hr  Q8H


 IV


   4/5/18 10:00


 5/5/18 09:59  4/7/18 09:45


 


 


 Triamterene/HCTZ


  (Dyazide)  1 cap  DAILY


 ORAL


   4/5/18 09:00


 5/5/18 08:59  4/7/18 08:36


 





Laboratory Tests


4/7/18 08:30: 


White Blood Count 4.8, Red Blood Count 3.09L, Hemoglobin 10.6L, Hematocrit 30.9L

, Mean Corpuscular Volume 100H, Mean Corpuscular Hemoglobin 34.2H, Mean 

Corpuscular Hemoglobin Concent 34.2, Red Cell Distribution Width 11.0L, 

Platelet Count 182, Mean Platelet Volume 6.2L, Neutrophils (%) (Auto) 64.2, 

Lymphocytes (%) (Auto) 20.0, Monocytes (%) (Auto) 12.5H, Eosinophils (%) (Auto) 

2.4, Basophils (%) (Auto) 1.0


Height (Feet):  5


Height (Inches):  4.00


Weight (Pounds):  185


Objective


abdomen soft, ND, wound clear, no CVAT











JEEVAN KENDALL Apr 7, 2018 13:36

## 2018-04-07 NOTE — GENERAL PROGRESS NOTE
Assessment/Plan


Assessment/Plan


Assessment


- (R) RCCA


- s/p partial nephrectomy


- diverticulosis


- recurrent abd pain


- SIBO (+)





Recommendations


- post op care


- f/u path results


- diet per urology


- bowel regimen


- OOB





Subjective


Allergies:  


Coded Allergies:  


     TRAMADOL (Verified  Allergy, Intermediate, 4/4/18)


 TREMORS AND VOMITING


     CODEINE (Verified  Adverse Reaction, Mild, 1/25/13)


 nausea


Subjective


POD #3


feels better


some wound pain


still bloated


(+) flatus


(-) BM


on liquids





Objective





Last 24 Hour Vital Signs








  Date Time  Temp Pulse Resp B/P (MAP) Pulse Ox O2 Delivery O2 Flow Rate FiO2


 


4/7/18 12:00 97.7 65 19 130/75 99 Nasal Cannula 2.0 





 97.7       


 


4/7/18 09:57  83 20  94 Nasal Cannula 3.0 


 


4/7/18 09:55  83 20  93 Nasal Cannula 3.0 


 


4/7/18 08:37    129/73    


 


4/7/18 08:36  76  129/73    


 


4/7/18 08:00 97.4 76 19 129/73 100 Nasal Cannula 2.0 





 97.4       


 


4/7/18 04:00 97.9 74 19 140/70 99 Nasal Cannula 2.0 





 97.9       


 


4/7/18 00:00 98.2 75 18 143/74 99 Nasal Cannula 2.0 





 98.2       


 


4/6/18 20:00 99.0 79 18 132/66 100 Nasal Cannula 2.0 





 99.0       


 


4/6/18 18:00    130/61    


 


4/6/18 16:00 98.4 77 20 130/61 99 Nasal Cannula 2.0 





 98.4       

















Intake and Output  


 


 4/6/18 4/7/18





 19:00 07:00


 


Intake Total 2185 ml 1125 ml


 


Balance 2185 ml 1125 ml


 


  


 


Intake Oral 700 ml 


 


IV Total 1485 ml 1125 ml


 


# Voids  1








Laboratory Tests


4/7/18 08:30: 


White Blood Count 4.8, Red Blood Count 3.09L, Hemoglobin 10.6L, Hematocrit 30.9L

, Mean Corpuscular Volume 100H, Mean Corpuscular Hemoglobin 34.2H, Mean 

Corpuscular Hemoglobin Concent 34.2, Red Cell Distribution Width 11.0L, 

Platelet Count 182, Mean Platelet Volume 6.2L, Neutrophils (%) (Auto) 64.2, 

Lymphocytes (%) (Auto) 20.0, Monocytes (%) (Auto) 12.5H, Eosinophils (%) (Auto) 

2.4, Basophils (%) (Auto) 1.0


Height (Feet):  5


Height (Inches):  4.00


Weight (Pounds):  185


Objective


WDWN AA woman


NCAT


supple


CTA


RRR


Abd obese, distended wounds OK


no edema











GLORIA BLACKBURN Apr 7, 2018 15:22

## 2018-04-07 NOTE — CARDIAC ELECTROPHYSIOLOGY PN
Assessment/Plan


Assessment/Plan


1. Hypertension.  Continue Diazide, Cardizem  and Hydralazine 25 bid


2. Right kidney 13 x 15 x 16 cm mass.  


     S/P partial nephrectomy by Dr. Roberts. 


     Now diet is advanced


3. Hyperlipidemia on Lipitor





DW RN 


DC pending BM and pain control per Dr Roberts





Subjective


Subjective


Has abdominal pain. Twin sister and RN at bedside.





Objective





Last 24 Hour Vital Signs








  Date Time  Temp Pulse Resp B/P (MAP) Pulse Ox O2 Delivery O2 Flow Rate FiO2


 


4/7/18 12:00 97.7 65 19 130/75 99 Nasal Cannula 2.0 





 97.7       


 


4/7/18 09:57  83 20  94 Nasal Cannula 3.0 


 


4/7/18 09:55  83 20  93 Nasal Cannula 3.0 


 


4/7/18 08:37    129/73    


 


4/7/18 08:36  76  129/73    


 


4/7/18 08:00 97.4 76 19 129/73 100 Nasal Cannula 2.0 





 97.4       


 


4/7/18 04:00 97.9 74 19 140/70 99 Nasal Cannula 2.0 





 97.9       


 


4/7/18 00:00 98.2 75 18 143/74 99 Nasal Cannula 2.0 





 98.2       


 


4/6/18 20:00 99.0 79 18 132/66 100 Nasal Cannula 2.0 





 99.0       


 


4/6/18 18:00    130/61    

















Intake and Output  


 


 4/6/18 4/7/18





 19:00 07:00


 


Intake Total 2185 ml 1125 ml


 


Balance 2185 ml 1125 ml


 


  


 


Intake Oral 700 ml 


 


IV Total 1485 ml 1125 ml


 


# Voids  1











Laboratory Tests








Test


  4/7/18


08:30


 


White Blood Count


  4.8 K/UL


(4.8-10.8)


 


Red Blood Count


  3.09 M/UL


(4.20-5.40)  L


 


Hemoglobin


  10.6 G/DL


(12.0-16.0)  L


 


Hematocrit


  30.9 %


(37.0-47.0)  L


 


Mean Corpuscular Volume


  100 FL (80-99)


H


 


Mean Corpuscular Hemoglobin


  34.2 PG


(27.0-31.0)  H


 


Mean Corpuscular Hemoglobin


Concent 34.2 G/DL


(32.0-36.0)


 


Red Cell Distribution Width


  11.0 %


(11.6-14.8)  L


 


Platelet Count


  182 K/UL


(150-450)


 


Mean Platelet Volume


  6.2 FL


(6.5-10.1)  L


 


Neutrophils (%) (Auto)


  64.2 %


(45.0-75.0)


 


Lymphocytes (%) (Auto)


  20.0 %


(20.0-45.0)


 


Monocytes (%) (Auto)


  12.5 %


(1.0-10.0)  H


 


Eosinophils (%) (Auto)


  2.4 %


(0.0-3.0)


 


Basophils (%) (Auto)


  1.0 %


(0.0-2.0)








Objective


HEAD AND NECK: No JVD.


LUNGS:  Clear.


CARDIOVASCULAR: Regular S1 and S2 with no gallop or murmur.


ABDOMEN:  Soft and status post laparoscopy kidney surgery.


EXTREMITIES:  No pitting edema.











Mario Verma MD Apr 7, 2018 16:04

## 2018-04-08 VITALS — DIASTOLIC BLOOD PRESSURE: 69 MMHG | SYSTOLIC BLOOD PRESSURE: 139 MMHG

## 2018-04-08 VITALS — DIASTOLIC BLOOD PRESSURE: 75 MMHG | SYSTOLIC BLOOD PRESSURE: 142 MMHG

## 2018-04-08 VITALS — SYSTOLIC BLOOD PRESSURE: 148 MMHG | DIASTOLIC BLOOD PRESSURE: 68 MMHG

## 2018-04-08 VITALS — SYSTOLIC BLOOD PRESSURE: 139 MMHG | DIASTOLIC BLOOD PRESSURE: 69 MMHG

## 2018-04-08 VITALS — SYSTOLIC BLOOD PRESSURE: 173 MMHG | DIASTOLIC BLOOD PRESSURE: 67 MMHG

## 2018-04-08 RX ADMIN — FLUTICASONE FUROATE AND VILANTEROL TRIFENATATE SCH: 100; 25 POWDER RESPIRATORY (INHALATION) at 09:39

## 2018-04-08 RX ADMIN — CYCLOBENZAPRINE HYDROCHLORIDE SCH MG: 10 TABLET, FILM COATED ORAL at 08:41

## 2018-04-08 RX ADMIN — HYDROCODONE BITARTRATE AND ACETAMINOPHEN PRN TAB: 5; 325 TABLET ORAL at 12:24

## 2018-04-08 RX ADMIN — DULOXETINE HYDROCHLORIDE SCH MG: 30 CAPSULE, DELAYED RELEASE ORAL at 08:41

## 2018-04-08 RX ADMIN — SODIUM CHLORIDE SCH MLS/HR: 0.9 INJECTION INTRAVENOUS at 05:33

## 2018-04-08 RX ADMIN — HYDROCODONE BITARTRATE AND ACETAMINOPHEN PRN TAB: 5; 325 TABLET ORAL at 07:50

## 2018-04-08 RX ADMIN — TRIAMTERENE AND HYDROCHLOROTHIAZIDE SCH CAP: 25; 37.5 CAPSULE ORAL at 08:42

## 2018-04-08 RX ADMIN — SODIUM CHLORIDE, SODIUM LACTATE, POTASSIUM CHLORIDE, AND CALCIUM CHLORIDE SCH MLS/HR: .6; .31; .03; .02 INJECTION, SOLUTION INTRAVENOUS at 12:29

## 2018-04-08 RX ADMIN — DOCUSATE SODIUM SCH MG: 100 CAPSULE, LIQUID FILLED ORAL at 08:41

## 2018-04-08 RX ADMIN — SODIUM CHLORIDE, SODIUM LACTATE, POTASSIUM CHLORIDE, AND CALCIUM CHLORIDE SCH MLS/HR: .6; .31; .03; .02 INJECTION, SOLUTION INTRAVENOUS at 02:00

## 2018-04-08 RX ADMIN — HYDRALAZINE HYDROCHLORIDE SCH MG: 25 TABLET ORAL at 08:41

## 2018-04-08 RX ADMIN — DILTIAZEM HYDROCHLORIDE SCH MG: 240 CAPSULE, EXTENDED RELEASE ORAL at 08:42

## 2018-04-08 RX ADMIN — HYDROCODONE BITARTRATE AND ACETAMINOPHEN PRN TAB: 5; 325 TABLET ORAL at 00:52

## 2018-04-08 RX ADMIN — SODIUM CHLORIDE, SODIUM LACTATE, POTASSIUM CHLORIDE, AND CALCIUM CHLORIDE SCH MLS/HR: .6; .31; .03; .02 INJECTION, SOLUTION INTRAVENOUS at 10:00

## 2018-04-08 RX ADMIN — FLUTICASONE PROPIONATE SCH SPRAY: 50 SPRAY, METERED NASAL at 08:42

## 2018-04-08 NOTE — UROLOGY PROGRESS NOTE
Assessment/Plan


Assessment/Plan


right renal mass


LUTS/DI hx


POD # 3, lap right partial nephrectomy


mild DANILO, improved





doing well


dulcolax supp


dc planning


f/u on path


d/w nursing staff





Subjective


Allergies:  


Coded Allergies:  


     TRAMADOL (Verified  Allergy, Intermediate, 4/4/18)


 TREMORS AND VOMITING


     CODEINE (Verified  Adverse Reaction, Mild, 1/25/13)


 nausea


Subjective


all noted, feels fair, voided, taking po's





Objective





Last 24 Hour Vital Signs








  Date Time  Temp Pulse Resp B/P (MAP) Pulse Ox O2 Delivery O2 Flow Rate FiO2


 


4/8/18 08:49 98.8       


 


4/8/18 08:42  72  139/69    


 


4/8/18 08:41 98.8       


 


4/8/18 08:41    139/69    


 


4/8/18 08:06 98.8 72 19 139/69  Room Air  





 98.8       


 


4/8/18 08:00 98.8 72 19 139/69 91 Room Air  





 98.8       


 


4/8/18 07:50 98.2       


 


4/8/18 04:00 98.2 66 18 142/75 100 Nasal Cannula 2.0 





 98.2       


 


4/8/18 00:00 98.3 92 18 148/68 98 Nasal Cannula 2.0 





 98.3       


 


4/7/18 20:00 98.1 84 18 157/78 98 Nasal Cannula 2.0 





 98.1       


 


4/7/18 17:41    138/65    


 


4/7/18 16:00 98.1 79 19 140/66 98 Nasal Cannula 2.0 





 98.1       


 


4/7/18 12:00 97.7 65 19 130/75 99 Nasal Cannula 2.0 





 97.7       


 


4/7/18 09:57  83 20  94 Nasal Cannula 3.0 


 


4/7/18 09:55  83 20  93 Nasal Cannula 3.0 

















Intake and Output  


 


 4/7/18 4/8/18





 19:00 07:00


 


Intake Total 1835 ml 1675 ml


 


Output Total  750 ml


 


Balance 1835 ml 925 ml


 


  


 


Intake Oral 600 ml 300 ml


 


IV Total 1235 ml 1375 ml


 


Output Urine Total  750 ml


 


# Voids 5 











Current Medications








 Medications


  (Trade)  Dose


 Ordered  Sig/George


 Route


 PRN Reason  Start Time


 Stop Time Status Last Admin


Dose Admin


 


 Acetaminophen/


 Hydrocodone Bitart


  (Norco 5/325)  1 tab  Q4H  PRN


 ORAL


 Moderate Pain (Pain Scale 4-6)  4/5/18 14:45


 4/12/18 14:44  4/5/18 22:17


 


 


 Acetaminophen/


 Hydrocodone Bitart


  (Norco 5/325)  2 tab  Q4H  PRN


 ORAL


 Severe Pain (Pain Scale 7-10)  4/5/18 14:45


 4/12/18 14:44  4/8/18 07:50


 


 


 Albuterol Sulfate


  (Proventil MDI)  2 puff  Q4H  PRN


 INH


 Shortness of Breath  4/4/18 13:45


 5/4/18 13:44   


 


 


 Atorvastatin


 Calcium


  (Lipitor)  20 mg  BEDTIME


 ORAL


   4/4/18 21:00


 5/4/18 20:59  4/7/18 20:32


 


 


 Cefazolin Sodium


 1 gm/Dextrose  110 ml @ 


 220 mls/hr  Q12H


 IVPB


   4/5/18 18:00


 4/12/18 17:59  4/8/18 05:33


 


 


 Cyclobenzaprine


 HCl


  (Flexeril)  5 mg  DAILY


 ORAL


   4/5/18 09:00


 5/5/18 08:59  4/8/18 08:41


 


 


 Diazepam


  (Valium)  5 mg  BIDPRN  PRN


 ORAL


 For Anxiety  4/4/18 13:45


 4/11/18 13:44   


 


 


 Diltiazem HCl


  (Cardizem CD)  240 mg  DAILY


 ORAL


   4/5/18 09:00


 5/5/18 08:59  4/8/18 08:42


 


 


 Docusate Sodium


  (Colace)  100 mg  TWICE A  DAY


 ORAL


   4/4/18 18:00


 5/4/18 17:59  4/8/18 08:41


 


 


 Duloxetine HCl


  (Cymbalta)  30 mg  DAILY


 ORAL


   4/5/18 09:00


 5/5/18 08:59  4/8/18 08:41


 


 


 Fluticasone


 Propionate


  (Flonase)  2 spray  BID


 NASAL


   4/4/18 18:00


 5/4/18 17:59  4/8/18 08:42


 


 


 Fluticasone/


 Vilanterol


  (Breo Ellipta


 100/25)  1  DAILY


 INH


   4/5/18 09:00


 5/5/18 08:59  4/7/18 09:55


 


 


 Hydralazine HCl


  (Apresoline)  25 mg  BID


 ORAL


   4/4/18 18:00


 5/4/18 17:59  4/8/18 08:41


 


 


 Lactated Ringer's  1,000 ml @ 


 125 mls/hr  Q8H


 IV


   4/5/18 10:00


 5/5/18 09:59  4/8/18 02:00


 


 


 Triamterene/HCTZ


  (Dyazide)  1 cap  DAILY


 ORAL


   4/5/18 09:00


 5/5/18 08:59  4/8/18 08:42


 








Height (Feet):  5


Height (Inches):  4.00


Weight (Pounds):  185


Objective


abdomen soft, ND, wound clear, no CVAT











JEEVAN KENDALL Apr 8, 2018 09:20

## 2018-04-08 NOTE — GENERAL PROGRESS NOTE
Assessment/Plan


Assessment/Plan


Assessment


- (R) RCCA


- s/p partial nephrectomy


- diverticulosis


- recurrent abd pain


- constipation


- SIBO (+)





Recommendations


- post op care


- f/u path results


- diet per urology


- bowel regimen / laxative


- OOB





Subjective


Allergies:  


Coded Allergies:  


     TRAMADOL (Verified  Allergy, Intermediate, 4/4/18)


 TREMORS AND VOMITING


     CODEINE (Verified  Adverse Reaction, Mild, 1/25/13)


 nausea


Subjective


POD #4


wound pain better


ambulating


got a suppository for BM





Objective





Last 24 Hour Vital Signs








  Date Time  Temp Pulse Resp B/P (MAP) Pulse Ox O2 Delivery O2 Flow Rate FiO2


 


4/8/18 13:23 98.8       


 


4/8/18 12:24 98.8       


 


4/8/18 12:00 98.2 75 18 173/67 97 Room Air  





 98.2       


 


4/8/18 09:41  84 20  95 Nasal Cannula 3.0 32


 


4/8/18 09:40 98.8       


 


4/8/18 09:39  86 18  95 Nasal Cannula 3.0 32


 


4/8/18 08:42  72  139/69    


 


4/8/18 08:41 98.8       


 


4/8/18 08:41    139/69    


 


4/8/18 08:06 98.8 72 19 139/69  Room Air  





 98.8       


 


4/8/18 08:00 98.8 72 19 139/69 91 Room Air  





 98.8       


 


4/8/18 07:50 98.2       


 


4/8/18 04:00 98.2 66 18 142/75 100 Nasal Cannula 2.0 





 98.2       


 


4/8/18 00:00 98.3 92 18 148/68 98 Nasal Cannula 2.0 





 98.3       


 


4/7/18 20:00 98.1 84 18 157/78 98 Nasal Cannula 2.0 





 98.1       


 


4/7/18 17:41    138/65    


 


4/7/18 16:00 98.1 79 19 140/66 98 Nasal Cannula 2.0 





 98.1       

















Intake and Output  


 


 4/7/18 4/8/18





 19:00 07:00


 


Intake Total 1835 ml 1675 ml


 


Output Total  750 ml


 


Balance 1835 ml 925 ml


 


  


 


Intake Oral 600 ml 300 ml


 


IV Total 1235 ml 1375 ml


 


Output Urine Total  750 ml


 


# Voids 5 








Height (Feet):  5


Height (Inches):  4.00


Weight (Pounds):  185


Objective


WDWN AA woman


NCAT


supple


CTA


RRR


Abd obese, distended wounds OK


no edema











GLORIA BLACKBURN Apr 8, 2018 15:02

## 2018-04-09 NOTE — DISCHARGE SUMMARY
Discharge Summary


DATE OF ADMISSION: 04/04/2018





DATE OF DISCHARGE: 04/08/2018





REASON FOR ADMISSION: 


72 years old female with history of 1.5 cm solid right renal mass, which was 

identified on CT scan last year.  Patient was evaluated as outpatient with 

multiple options discussed with her , including surveillance versus surgical 

excision versus CT guided cryoablation or radiofrequency ablation.  Pros and 

cons of each  approach were discussed with the patient.  Patient desired to 

have surgical excision.  Patient was scheduled for elective procedure.  Risks 

and complications of bleeding, infection, possible damage to the kidney  or 

damage to internal organs,   possible need for radical nephrectomy , were 

discussed with the patient Patient verbalized understanding,  signed the 

consent and was scheduled for elective surgery on 04/04/2018.  Patient was 

admitted with diagnosis of  right renal mass


 


CONSULTANTS:


cardiologist Dr. Verma


GI specialist Dr. Couch 


surgery Dr. Jerome  





[facility] COURSE: 


Patient was admitted for elective surgery.  Patient undergone on 4/4 /18 

laparoscopic partial right nephrectomy with lysis of adhesions.  Pathology 

results still pending.


Cardiologist closely followed.  Patient with  history of hypertension and 

hyperlipidemia.  Blood pressure was managed with the Cardizem CD,  Dyazide and 

hydralazine.  Blood pressure remained stable.  Statin was continued.  Pulse 

oximetry was stable on room air.  GI specialist closely followed.  Patient had 

a recurrent abdominal pain.  Extensive previous workup for abdominal pain was 

negative.   Patient ,however, had evidence of diverticulosis as well as  small 

intestines bacterial overgrowth.  Bowel regimen was  instituted after surgery. 

Pain management provided.  Early ambulation was encouraged.


Patient was able to tolerate diet. Patient was voided freely, without 

difficulties. Incision with staples, open to air, clean, dry, intact. 


Patient was stable for discharge home  with outpatient follow-up with  surgeon.





FINAL DIAGNOSES: 


Right renal mass


Status post laparoscopic partial right nephrectomy with lysis of adhesions on 04 /04/2018


Hypertension


Hyperlipidemia


Recurrent abdominal pain


Diverticulosis


Constipation


Small intestines bacterial overgrowth





DISCHARGE MEDICATIONS:


See Medication Reconciliation list.





DISCHARGE INSTRUCTIONS:


Patient was discharged home.  


Follow up with primary care provider.


Follow up with surgeon as advised.  





I have been assigned to dictate discharge summary for this account. I was not 

involved in the patient's management.











Chikis Martin NP ([place]) Apr 9, 2018 12:32

## 2018-08-02 ENCOUNTER — HOSPITAL ENCOUNTER (OUTPATIENT)
Dept: HOSPITAL 72 - LAB | Age: 73
Discharge: HOME | End: 2018-08-02
Payer: MEDICARE

## 2018-08-02 DIAGNOSIS — N28.89: Primary | ICD-10-CM

## 2018-08-02 LAB
% IRON SATURATION: 100 % (ref 15–50)
ADD MANUAL DIFF: NO
ALBUMIN SERPL-MCNC: 3.7 G/DL (ref 3.4–5)
ALP SERPL-CCNC: 105 U/L (ref 46–116)
ALT SERPL-CCNC: 23 U/L (ref 12–78)
ANION GAP SERPL CALC-SCNC: 10 MMOL/L (ref 5–15)
APTT BLD: 26 SEC (ref 23–33)
AST SERPL-CCNC: 17 U/L (ref 15–37)
BASOPHILS NFR BLD AUTO: 1.6 % (ref 0–2)
BILIRUB DIRECT SERPL-MCNC: 0.2 MG/DL (ref 0–0.3)
BILIRUB SERPL-MCNC: 0.7 MG/DL (ref 0.2–1)
BUN SERPL-MCNC: 23 MG/DL (ref 7–18)
CALCIUM SERPL-MCNC: 9.1 MG/DL (ref 8.5–10.1)
CHLORIDE SERPL-SCNC: 108 MMOL/L (ref 98–107)
CHOLEST SERPL-MCNC: 133 MG/DL (ref ?–200)
CO2 SERPL-SCNC: 26 MMOL/L (ref 21–32)
CREAT SERPL-MCNC: 1.6 MG/DL (ref 0.55–1.3)
EOSINOPHIL NFR BLD AUTO: 1.6 % (ref 0–3)
ERYTHROCYTE [DISTWIDTH] IN BLOOD BY AUTOMATED COUNT: 11.3 % (ref 11.6–14.8)
FERRITIN SERPL-MCNC: 107 NG/ML (ref 8–388)
HCT VFR BLD CALC: 39.2 % (ref 37–47)
HDLC SERPL-MCNC: 44 MG/DL (ref 40–60)
HGB BLD-MCNC: 12.9 G/DL (ref 12–16)
INR PPP: 0.9 (ref 0.9–1.1)
IRON SERPL-MCNC: 107 UG/DL (ref 50–175)
LYMPHOCYTES NFR BLD AUTO: 32.8 % (ref 20–45)
MCV RBC AUTO: 98 FL (ref 80–99)
MONOCYTES NFR BLD AUTO: 7.2 % (ref 1–10)
NEUTROPHILS NFR BLD AUTO: 56.8 % (ref 45–75)
PLATELET # BLD: 241 K/UL (ref 150–450)
POTASSIUM SERPL-SCNC: 3.5 MMOL/L (ref 3.5–5.1)
RBC # BLD AUTO: 3.98 M/UL (ref 4.2–5.4)
SODIUM SERPL-SCNC: 144 MMOL/L (ref 136–145)
TIBC SERPL-MCNC: 107 UG/DL (ref 250–450)
TRIGL SERPL-MCNC: 137 MG/DL (ref 30–150)
UNSATURATED IRON BINDING: 0 UG/DL (ref 112–346)
WBC # BLD AUTO: 3.8 K/UL (ref 4.8–10.8)

## 2018-08-02 PROCEDURE — 82784 ASSAY IGA/IGD/IGG/IGM EACH: CPT

## 2018-08-02 PROCEDURE — 86304 IMMUNOASSAY TUMOR CA 125: CPT

## 2018-08-02 PROCEDURE — 86300 IMMUNOASSAY TUMOR CA 15-3: CPT

## 2018-08-02 PROCEDURE — 83540 ASSAY OF IRON: CPT

## 2018-08-02 PROCEDURE — 80061 LIPID PANEL: CPT

## 2018-08-02 PROCEDURE — 83550 IRON BINDING TEST: CPT

## 2018-08-02 PROCEDURE — 82728 ASSAY OF FERRITIN: CPT

## 2018-08-02 PROCEDURE — 85730 THROMBOPLASTIN TIME PARTIAL: CPT

## 2018-08-02 PROCEDURE — 82607 VITAMIN B-12: CPT

## 2018-08-02 PROCEDURE — 86301 IMMUNOASSAY TUMOR CA 19-9: CPT

## 2018-08-02 PROCEDURE — 85025 COMPLETE CBC W/AUTO DIFF WBC: CPT

## 2018-08-02 PROCEDURE — 82378 CARCINOEMBRYONIC ANTIGEN: CPT

## 2018-08-02 PROCEDURE — 36415 COLL VENOUS BLD VENIPUNCTURE: CPT

## 2018-08-02 PROCEDURE — 80048 BASIC METABOLIC PNL TOTAL CA: CPT

## 2018-08-02 PROCEDURE — 85610 PROTHROMBIN TIME: CPT

## 2018-08-02 PROCEDURE — 84436 ASSAY OF TOTAL THYROXINE: CPT

## 2018-08-02 PROCEDURE — 84443 ASSAY THYROID STIM HORMONE: CPT

## 2018-08-02 PROCEDURE — 82746 ASSAY OF FOLIC ACID SERUM: CPT

## 2018-08-02 PROCEDURE — 80076 HEPATIC FUNCTION PANEL: CPT

## 2018-08-14 ENCOUNTER — HOSPITAL ENCOUNTER (OUTPATIENT)
Dept: HOSPITAL 72 - LAB | Age: 73
Discharge: HOME | End: 2018-08-14
Payer: MEDICARE

## 2018-08-14 DIAGNOSIS — K59.01: ICD-10-CM

## 2018-08-14 DIAGNOSIS — K57.30: Primary | ICD-10-CM

## 2018-08-14 LAB
ADD MANUAL DIFF: NO
ALBUMIN SERPL-MCNC: 3.7 G/DL (ref 3.4–5)
ALBUMIN/GLOB SERPL: 0.9 {RATIO} (ref 1–2.7)
ALP SERPL-CCNC: 103 U/L (ref 46–116)
ALT SERPL-CCNC: 18 U/L (ref 12–78)
ANION GAP SERPL CALC-SCNC: 8 MMOL/L (ref 5–15)
AST SERPL-CCNC: 19 U/L (ref 15–37)
BASOPHILS NFR BLD AUTO: 2 % (ref 0–2)
BILIRUB SERPL-MCNC: 0.9 MG/DL (ref 0.2–1)
BUN SERPL-MCNC: 22 MG/DL (ref 7–18)
CALCIUM SERPL-MCNC: 9.3 MG/DL (ref 8.5–10.1)
CHLORIDE SERPL-SCNC: 107 MMOL/L (ref 98–107)
CO2 SERPL-SCNC: 26 MMOL/L (ref 21–32)
CREAT SERPL-MCNC: 1.3 MG/DL (ref 0.55–1.3)
EOSINOPHIL NFR BLD AUTO: 1.5 % (ref 0–3)
ERYTHROCYTE [DISTWIDTH] IN BLOOD BY AUTOMATED COUNT: 10.9 % (ref 11.6–14.8)
GLOBULIN SER-MCNC: 4 G/DL
HCT VFR BLD CALC: 38.5 % (ref 37–47)
HGB BLD-MCNC: 13.3 G/DL (ref 12–16)
LYMPHOCYTES NFR BLD AUTO: 38.9 % (ref 20–45)
MCV RBC AUTO: 99 FL (ref 80–99)
MONOCYTES NFR BLD AUTO: 7.8 % (ref 1–10)
NEUTROPHILS NFR BLD AUTO: 49.9 % (ref 45–75)
PLATELET # BLD: 232 K/UL (ref 150–450)
POTASSIUM SERPL-SCNC: 3.3 MMOL/L (ref 3.5–5.1)
RBC # BLD AUTO: 3.9 M/UL (ref 4.2–5.4)
SODIUM SERPL-SCNC: 141 MMOL/L (ref 136–145)
WBC # BLD AUTO: 3.5 K/UL (ref 4.8–10.8)

## 2018-08-14 PROCEDURE — 85025 COMPLETE CBC W/AUTO DIFF WBC: CPT

## 2018-08-14 PROCEDURE — 84443 ASSAY THYROID STIM HORMONE: CPT

## 2018-08-14 PROCEDURE — 80053 COMPREHEN METABOLIC PANEL: CPT

## 2018-08-14 PROCEDURE — 36415 COLL VENOUS BLD VENIPUNCTURE: CPT

## 2018-08-22 ENCOUNTER — HOSPITAL ENCOUNTER (OUTPATIENT)
Dept: HOSPITAL 72 - LAB | Age: 73
Discharge: HOME | End: 2018-08-22
Payer: MEDICARE

## 2018-08-22 DIAGNOSIS — R91.8: Primary | ICD-10-CM

## 2018-08-22 LAB
% IRON SATURATION: 28 % (ref 15–50)
ADD MANUAL DIFF: NO
ANION GAP SERPL CALC-SCNC: 10 MMOL/L (ref 5–15)
BASOPHILS NFR BLD AUTO: 1.5 % (ref 0–2)
BUN SERPL-MCNC: 21 MG/DL (ref 7–18)
CALCIUM SERPL-MCNC: 9.7 MG/DL (ref 8.5–10.1)
CHLORIDE SERPL-SCNC: 104 MMOL/L (ref 98–107)
CO2 SERPL-SCNC: 27 MMOL/L (ref 21–32)
CREAT SERPL-MCNC: 1.4 MG/DL (ref 0.55–1.3)
EOSINOPHIL NFR BLD AUTO: 1.7 % (ref 0–3)
ERYTHROCYTE [DISTWIDTH] IN BLOOD BY AUTOMATED COUNT: 11 % (ref 11.6–14.8)
FERRITIN SERPL-MCNC: 109 NG/ML (ref 8–388)
HCT VFR BLD CALC: 40.5 % (ref 37–47)
HGB BLD-MCNC: 13.6 G/DL (ref 12–16)
IRON SERPL-MCNC: 91 UG/DL (ref 50–175)
LYMPHOCYTES NFR BLD AUTO: 33.1 % (ref 20–45)
MCV RBC AUTO: 99 FL (ref 80–99)
MONOCYTES NFR BLD AUTO: 11.7 % (ref 1–10)
NEUTROPHILS NFR BLD AUTO: 52 % (ref 45–75)
PLATELET # BLD: 273 K/UL (ref 150–450)
POTASSIUM SERPL-SCNC: 3.5 MMOL/L (ref 3.5–5.1)
RBC # BLD AUTO: 4.09 M/UL (ref 4.2–5.4)
SODIUM SERPL-SCNC: 141 MMOL/L (ref 136–145)
TIBC SERPL-MCNC: 330 UG/DL (ref 250–450)
UNSATURATED IRON BINDING: 239 UG/DL (ref 112–346)
WBC # BLD AUTO: 3.9 K/UL (ref 4.8–10.8)

## 2018-08-22 PROCEDURE — 80048 BASIC METABOLIC PNL TOTAL CA: CPT

## 2018-08-22 PROCEDURE — 82728 ASSAY OF FERRITIN: CPT

## 2018-08-22 PROCEDURE — 36415 COLL VENOUS BLD VENIPUNCTURE: CPT

## 2018-08-22 PROCEDURE — 83550 IRON BINDING TEST: CPT

## 2018-08-22 PROCEDURE — 84443 ASSAY THYROID STIM HORMONE: CPT

## 2018-08-22 PROCEDURE — 83540 ASSAY OF IRON: CPT

## 2018-08-22 PROCEDURE — 84436 ASSAY OF TOTAL THYROXINE: CPT

## 2018-08-22 PROCEDURE — 85025 COMPLETE CBC W/AUTO DIFF WBC: CPT

## 2018-09-06 ENCOUNTER — HOSPITAL ENCOUNTER (OUTPATIENT)
Dept: HOSPITAL 72 - LAB | Age: 73
Discharge: HOME | End: 2018-09-06
Payer: MEDICARE

## 2018-09-06 DIAGNOSIS — C64.1: Primary | ICD-10-CM

## 2018-09-06 LAB
% IRON SATURATION: 27 % (ref 15–50)
ADD MANUAL DIFF: NO
ALBUMIN SERPL-MCNC: 3.9 G/DL (ref 3.4–5)
ALBUMIN/GLOB SERPL: 0.9 {RATIO} (ref 1–2.7)
ALP SERPL-CCNC: 117 U/L (ref 46–116)
ALT SERPL-CCNC: 25 U/L (ref 12–78)
ANION GAP SERPL CALC-SCNC: 7 MMOL/L (ref 5–15)
AST SERPL-CCNC: 19 U/L (ref 15–37)
BASOPHILS NFR BLD AUTO: 1.8 % (ref 0–2)
BILIRUB DIRECT SERPL-MCNC: 0.1 MG/DL (ref 0–0.3)
BILIRUB SERPL-MCNC: 0.6 MG/DL (ref 0.2–1)
BUN SERPL-MCNC: 27 MG/DL (ref 7–18)
CALCIUM SERPL-MCNC: 9.7 MG/DL (ref 8.5–10.1)
CHLORIDE SERPL-SCNC: 107 MMOL/L (ref 98–107)
CO2 SERPL-SCNC: 29 MMOL/L (ref 21–32)
CREAT SERPL-MCNC: 1.6 MG/DL (ref 0.55–1.3)
EOSINOPHIL NFR BLD AUTO: 1.6 % (ref 0–3)
ERYTHROCYTE [DISTWIDTH] IN BLOOD BY AUTOMATED COUNT: 10.8 % (ref 11.6–14.8)
FERRITIN SERPL-MCNC: 113 NG/ML (ref 8–388)
GLOBULIN SER-MCNC: 4.2 G/DL
HCT VFR BLD CALC: 42.3 % (ref 37–47)
HGB BLD-MCNC: 14.2 G/DL (ref 12–16)
IRON SERPL-MCNC: 83 UG/DL (ref 50–175)
LYMPHOCYTES NFR BLD AUTO: 30.7 % (ref 20–45)
MCV RBC AUTO: 100 FL (ref 80–99)
MONOCYTES NFR BLD AUTO: 6.9 % (ref 1–10)
NEUTROPHILS NFR BLD AUTO: 59 % (ref 45–75)
PLATELET # BLD: 227 K/UL (ref 150–450)
POTASSIUM SERPL-SCNC: 3.8 MMOL/L (ref 3.5–5.1)
RBC # BLD AUTO: 4.24 M/UL (ref 4.2–5.4)
SODIUM SERPL-SCNC: 143 MMOL/L (ref 136–145)
TIBC SERPL-MCNC: 312 UG/DL (ref 250–450)
UNSATURATED IRON BINDING: 229 UG/DL (ref 112–346)
WBC # BLD AUTO: 3.7 K/UL (ref 4.8–10.8)

## 2018-09-06 PROCEDURE — 83540 ASSAY OF IRON: CPT

## 2018-09-06 PROCEDURE — 82785 ASSAY OF IGE: CPT

## 2018-09-06 PROCEDURE — 82746 ASSAY OF FOLIC ACID SERUM: CPT

## 2018-09-06 PROCEDURE — 82728 ASSAY OF FERRITIN: CPT

## 2018-09-06 PROCEDURE — 82248 BILIRUBIN DIRECT: CPT

## 2018-09-06 PROCEDURE — 84443 ASSAY THYROID STIM HORMONE: CPT

## 2018-09-06 PROCEDURE — 85025 COMPLETE CBC W/AUTO DIFF WBC: CPT

## 2018-09-06 PROCEDURE — 83550 IRON BINDING TEST: CPT

## 2018-09-06 PROCEDURE — 82607 VITAMIN B-12: CPT

## 2018-09-06 PROCEDURE — 84436 ASSAY OF TOTAL THYROXINE: CPT

## 2018-09-06 PROCEDURE — 82784 ASSAY IGA/IGD/IGG/IGM EACH: CPT

## 2018-09-06 PROCEDURE — 80053 COMPREHEN METABOLIC PANEL: CPT

## 2018-09-06 PROCEDURE — 36415 COLL VENOUS BLD VENIPUNCTURE: CPT

## 2018-10-23 ENCOUNTER — HOSPITAL ENCOUNTER (OUTPATIENT)
Dept: HOSPITAL 72 - LAB | Age: 73
Discharge: HOME | End: 2018-10-23
Payer: MEDICARE

## 2018-10-23 DIAGNOSIS — C64.1: Primary | ICD-10-CM

## 2018-10-23 LAB
% IRON SATURATION: 23 % (ref 15–50)
ADD MANUAL DIFF: NO
ALBUMIN SERPL-MCNC: 4 G/DL (ref 3.4–5)
ALP SERPL-CCNC: 131 U/L (ref 46–116)
ALT SERPL-CCNC: 20 U/L (ref 12–78)
ANION GAP SERPL CALC-SCNC: 11 MMOL/L (ref 5–15)
AST SERPL-CCNC: 19 U/L (ref 15–37)
BASOPHILS NFR BLD AUTO: 1.4 % (ref 0–2)
BILIRUB DIRECT SERPL-MCNC: 0.2 MG/DL (ref 0–0.3)
BILIRUB SERPL-MCNC: 0.8 MG/DL (ref 0.2–1)
BUN SERPL-MCNC: 18 MG/DL (ref 7–18)
CALCIUM SERPL-MCNC: 9.8 MG/DL (ref 8.5–10.1)
CHLORIDE SERPL-SCNC: 104 MMOL/L (ref 98–107)
CO2 SERPL-SCNC: 27 MMOL/L (ref 21–32)
CREAT SERPL-MCNC: 1.4 MG/DL (ref 0.55–1.3)
EOSINOPHIL NFR BLD AUTO: 14.6 % (ref 0–3)
ERYTHROCYTE [DISTWIDTH] IN BLOOD BY AUTOMATED COUNT: 10.7 % (ref 11.6–14.8)
FERRITIN SERPL-MCNC: 141 NG/ML (ref 8–388)
HCT VFR BLD CALC: 42.3 % (ref 37–47)
HGB BLD-MCNC: 14.3 G/DL (ref 12–16)
IRON SERPL-MCNC: 76 UG/DL (ref 50–175)
LYMPHOCYTES NFR BLD AUTO: 25.8 % (ref 20–45)
MCV RBC AUTO: 97 FL (ref 80–99)
MONOCYTES NFR BLD AUTO: 5.2 % (ref 1–10)
NEUTROPHILS NFR BLD AUTO: 53 % (ref 45–75)
PLATELET # BLD: 271 K/UL (ref 150–450)
POTASSIUM SERPL-SCNC: 3.2 MMOL/L (ref 3.5–5.1)
RBC # BLD AUTO: 4.35 M/UL (ref 4.2–5.4)
SODIUM SERPL-SCNC: 142 MMOL/L (ref 136–145)
TIBC SERPL-MCNC: 329 UG/DL (ref 250–450)
UNSATURATED IRON BINDING: 253 UG/DL (ref 112–346)
WBC # BLD AUTO: 5.5 K/UL (ref 4.8–10.8)

## 2018-10-23 PROCEDURE — 83550 IRON BINDING TEST: CPT

## 2018-10-23 PROCEDURE — 80076 HEPATIC FUNCTION PANEL: CPT

## 2018-10-23 PROCEDURE — 82728 ASSAY OF FERRITIN: CPT

## 2018-10-23 PROCEDURE — 82784 ASSAY IGA/IGD/IGG/IGM EACH: CPT

## 2018-10-23 PROCEDURE — 82378 CARCINOEMBRYONIC ANTIGEN: CPT

## 2018-10-23 PROCEDURE — 84436 ASSAY OF TOTAL THYROXINE: CPT

## 2018-10-23 PROCEDURE — 85025 COMPLETE CBC W/AUTO DIFF WBC: CPT

## 2018-10-23 PROCEDURE — 82746 ASSAY OF FOLIC ACID SERUM: CPT

## 2018-10-23 PROCEDURE — 84443 ASSAY THYROID STIM HORMONE: CPT

## 2018-10-23 PROCEDURE — 80048 BASIC METABOLIC PNL TOTAL CA: CPT

## 2018-10-23 PROCEDURE — 82607 VITAMIN B-12: CPT

## 2018-10-23 PROCEDURE — 83540 ASSAY OF IRON: CPT

## 2018-10-23 PROCEDURE — 36415 COLL VENOUS BLD VENIPUNCTURE: CPT

## 2018-11-08 ENCOUNTER — HOSPITAL ENCOUNTER (OUTPATIENT)
Dept: HOSPITAL 72 - LAB | Age: 73
Discharge: HOME | End: 2018-11-08
Payer: MEDICARE

## 2018-11-08 DIAGNOSIS — I10: Primary | ICD-10-CM

## 2018-11-08 LAB
ADD MANUAL DIFF: NO
ALBUMIN SERPL-MCNC: 3.8 G/DL (ref 3.4–5)
ALBUMIN/GLOB SERPL: 0.8 {RATIO} (ref 1–2.7)
ALP SERPL-CCNC: 111 U/L (ref 46–116)
ALT SERPL-CCNC: 26 U/L (ref 12–78)
ANION GAP SERPL CALC-SCNC: 8 MMOL/L (ref 5–15)
AST SERPL-CCNC: 22 U/L (ref 15–37)
BASOPHILS NFR BLD AUTO: 0.7 % (ref 0–2)
BILIRUB SERPL-MCNC: 0.6 MG/DL (ref 0.2–1)
BUN SERPL-MCNC: 15 MG/DL (ref 7–18)
CALCIUM SERPL-MCNC: 9.6 MG/DL (ref 8.5–10.1)
CHLORIDE SERPL-SCNC: 106 MMOL/L (ref 98–107)
CHOLEST SERPL-MCNC: 121 MG/DL (ref ?–200)
CO2 SERPL-SCNC: 29 MMOL/L (ref 21–32)
CREAT SERPL-MCNC: 1.2 MG/DL (ref 0.55–1.3)
EOSINOPHIL NFR BLD AUTO: 1.6 % (ref 0–3)
ERYTHROCYTE [DISTWIDTH] IN BLOOD BY AUTOMATED COUNT: 11.3 % (ref 11.6–14.8)
GLOBULIN SER-MCNC: 4.7 G/DL
HCT VFR BLD CALC: 43.2 % (ref 37–47)
HDLC SERPL-MCNC: 39 MG/DL (ref 40–60)
HGB BLD-MCNC: 14.5 G/DL (ref 12–16)
LYMPHOCYTES NFR BLD AUTO: 35.4 % (ref 20–45)
MCV RBC AUTO: 99 FL (ref 80–99)
MONOCYTES NFR BLD AUTO: 8.6 % (ref 1–10)
NEUTROPHILS NFR BLD AUTO: 53.7 % (ref 45–75)
PLATELET # BLD: 268 K/UL (ref 150–450)
POTASSIUM SERPL-SCNC: 3.3 MMOL/L (ref 3.5–5.1)
RBC # BLD AUTO: 4.38 M/UL (ref 4.2–5.4)
SODIUM SERPL-SCNC: 143 MMOL/L (ref 136–145)
TRIGL SERPL-MCNC: 149 MG/DL (ref 30–150)
WBC # BLD AUTO: 4.3 K/UL (ref 4.8–10.8)

## 2018-11-08 PROCEDURE — 36415 COLL VENOUS BLD VENIPUNCTURE: CPT

## 2018-11-08 PROCEDURE — 82306 VITAMIN D 25 HYDROXY: CPT

## 2018-11-08 PROCEDURE — 85025 COMPLETE CBC W/AUTO DIFF WBC: CPT

## 2018-11-08 PROCEDURE — 80061 LIPID PANEL: CPT

## 2018-11-08 PROCEDURE — 84443 ASSAY THYROID STIM HORMONE: CPT

## 2018-11-08 PROCEDURE — 80053 COMPREHEN METABOLIC PANEL: CPT

## 2019-05-28 NOTE — NUR
HAND-OFF: 

Report given to Priya MONTES. pt is on bed, stated that she feel better after the 
pain meds but still feel the pain.

## 2019-05-28 NOTE — EMERGENCY ROOM REPORT
History of Present Illness


General


Chief Complaint:  Abdominal Pain


Source:  Patient





Present Illness


HPI


States that around 6 AM this morning she woke up with diffuse abdominal pain.  

She states that she has had pain all day today and since that time has 

developed bloody diarrhea.  She states she had significant amount of rectal 

bleeding.  She states that she has been fatigued.  She denies fever or chills.  

She has had several episodes of nausea and vomiting.  She denies dysuria or 

hematuria.  She has no other complaints.


Allergies:  


Coded Allergies:  


     TRAMADOL (Verified  Allergy, Intermediate, 4/4/18)


 TREMORS AND VOMITING


     CODEINE (Verified  Adverse Reaction, Mild, 1/25/13)


 nausea





Patient History


Past Medical History:  see triage record, DM, HTN, renal disease, other - Hx of 

renal cell carcinoma and tumor resection.


Social History:  Denies: smoking, alcohol use, drug use


Pregnant Now:  No


Reviewed Nursing Documentation:  PMH: Agreed; PSxH: Agreed





Nursing Documentation-PMH


Past Medical History:  No History, Except For


Hx Cardiac Problems:  Yes - neuropathy


Hx Hypertension:  Yes


Hx Pacemaker:  No


Hx Asthma:  Yes


Hx COPD:  Yes


Hx Diabetes:  Yes - controlled


Hx Cancer:  Yes


Hx Gastrointestinal Problems:  Yes - diverticulitis, ulcerative colitis


Hx Dialysis:  No


Hx Neurological Problems:  No


Hx Cerebrovascular Accident:  No


Hx Seizures:  Yes - 2 years ago





Review of Systems


All Other Systems:  negative except mentioned in HPI





Physical Exam





Vital Signs








  Date Time  Temp Pulse Resp B/P (MAP) Pulse Ox O2 Delivery O2 Flow Rate FiO2


 


5/28/19 16:59 98.4 101 20 147/77 (100) 96 Room Air  








Sp02 EP Interpretation:  reviewed, normal


General Appearance:  no apparent distress, alert, GCS 15, non-toxic


Head:  normocephalic, atraumatic


Eyes:  bilateral eye normal inspection, bilateral eye PERRL


ENT:  hearing grossly normal, normal pharynx, no angioedema, normal voice


Neck:  full range of motion, supple/symm/no masses


Respiratory:  chest non-tender, lungs clear, normal breath sounds, no 

respiratory distress, no retraction, no accessory muscle use, speaking full 

sentences


Cardiovascular #1:  regular rate, rhythm, no edema


Gastrointestinal:  normal bowel sounds, soft, non-distended, no guarding, no 

rebound, tenderness - TTP diffusely but >in the LLQ


Rectal:  deferred


Musculoskeletal:  back normal, gait/station normal, normal range of motion, non-

tender


Neurologic:  alert, oriented x3, responsive, motor strength/tone normal, 

sensory intact, speech normal


Psychiatric:  judgement/insight normal, memory normal, mood/affect normal, no 

suicidal/homicidal ideation


Skin:  normal color, no rash, warm/dry, well hydrated





Medical Decision Making


Diagnostic Impression:  


 Primary Impression:  


 Diverticulitis


ER Course


This patient is found to have diverticulitis.  She did have severe intractable 

pain.  She has had several doses of morphine IV.  She also had presented with 

multiple episodes of vomiting.  I felt that given this patient's age and her 

uncontrolled pain and vomiting that she would not be able to tolerate 

outpatient treatment.  The patient was admitted for pain control and vomiting 

control and for antibiotics for diverticulitis.





Laboratory Tests








Test


  5/28/19


17:10 5/28/19


17:20 5/28/19


19:00


 


Urine Color Yellow    


 


Urine Appearance Clear    


 


Urine pH 5 (4.5-8.0)    


 


Urine Specific Gravity


  1.020


(1.005-1.035) 


  


 


 


Urine Protein


  2+ (NEGATIVE)


H 


  


 


 


Urine Glucose (UA)


  Negative


(NEGATIVE) 


  


 


 


Urine Ketones


  Negative


(NEGATIVE) 


  


 


 


Urine Blood


  1+ (NEGATIVE)


H 


  


 


 


Urine Nitrite


  Negative


(NEGATIVE) 


  


 


 


Urine Bilirubin


  Negative


(NEGATIVE) 


  


 


 


Urine Urobilinogen


  Normal MG/DL


(0.0-1.0) 


  


 


 


Urine Leukocyte Esterase


  2+ (NEGATIVE)


H 


  


 


 


Urine RBC


  0-2 /HPF (0 -


2) 


  


 


 


Urine WBC


  2-4 /HPF (0 -


2) 


  


 


 


Urine Squamous Epithelial


Cells Few /LPF


(NONE/OCC) 


  


 


 


Urine Bacteria


  Few /HPF


(NONE) 


  


 


 


Sodium Level


  


  143 MMOL/L


(136-145) 


 


 


Potassium Level


  


  3.5 MMOL/L


(3.5-5.1) 


 


 


Chloride Level


  


  104 MMOL/L


() 


 


 


Carbon Dioxide Level


  


  28 MMOL/L


(21-32) 


 


 


Anion Gap


  


  11 mmol/L


(5-15) 


 


 


Blood Urea Nitrogen


  


  26 mg/dL


(7-18)  H 


 


 


Creatinine


  


  1.5 MG/DL


(0.55-1.30)  H 


 


 


Estimate Glomerular


Filtration Rate 


   mL/min (>60)  


  


 


 


Glucose Level


  


  109 MG/DL


()  H 


 


 


Calcium Level


  


  10.1 MG/DL


(8.5-10.1) 


 


 


Total Bilirubin


  


  0.8 MG/DL


(0.2-1.0) 


 


 


Aspartate Amino Transferase


(AST) 


  22 U/L (15-37)


  


 


 


Alanine Aminotransferase (ALT)


  


  29 U/L (12-78)


  


 


 


Alkaline Phosphatase


  


  122 U/L


()  H 


 


 


Troponin I


  


  0.000 ng/mL


(0.000-0.056) 


 


 


Total Protein


  


  7.7 G/DL


(6.4-8.2) 


 


 


Albumin


  


  4.1 G/DL


(3.4-5.0) 


 


 


Globulin  3.6 g/dL   


 


Albumin/Globulin Ratio  1.1 (1.0-2.7)   


 


Lipase


  


  79 U/L


() 


 


 


White Blood Count


  


  


  8.2 K/UL


(4.8-10.8)


 


Red Blood Count


  


  


  4.17 M/UL


(4.20-5.40)  L


 


Hemoglobin


  


  


  13.9 G/DL


(12.0-16.0)


 


Hematocrit


  


  


  39.7 %


(37.0-47.0)


 


Mean Corpuscular Volume   95 FL (80-99)  


 


Mean Corpuscular Hemoglobin


  


  


  33.4 PG


(27.0-31.0)  H


 


Mean Corpuscular Hemoglobin


Concent 


  


  35.1 G/DL


(32.0-36.0)


 


Red Cell Distribution Width


  


  


  10.7 %


(11.6-14.8)  L


 


Platelet Count


  


  


  204 K/UL


(150-450)


 


Mean Platelet Volume


  


  


  5.8 FL


(6.5-10.1)  L


 


Neutrophils (%) (Auto)


  


  


  74.9 %


(45.0-75.0)


 


Lymphocytes (%) (Auto)


  


  


  18.2 %


(20.0-45.0)  L


 


Monocytes (%) (Auto)


  


  


  5.7 %


(1.0-10.0)


 


Eosinophils (%) (Auto)


  


  


  0.1 %


(0.0-3.0)


 


Basophils (%) (Auto)


  


  


  1.1 %


(0.0-2.0)


 


Prothrombin Time


  


  


  10.1 SEC


(9.30-11.50)


 


Prothrombin Time INR   1.0 (0.9-1.1)  


 


PTT


  


  


  19 SEC (23-33)


L








EKG Diagnostic Results


Rate:  tachycardiac


Rhythm:  other - S.tachycardia


ST Segments:  other - NSST findings.





Rhythm Strip Diag. Results


EP Interpretation:  yes


Rate:  90's


Rhythm:  NSR, no PVC's, no ectopy





CT/MRI/US Diagnostic Results


CT/MRI/US Diagnostic Results :  


   Imaging Test Ordered:  CT abd/pelvis


   Impression


Uncomplicated diverticulitis.





Last Vital Signs








  Date Time  Temp Pulse Resp B/P (MAP) Pulse Ox O2 Delivery O2 Flow Rate FiO2


 


5/28/19 16:59 98.4 101 20 147/77 (100) 96 Room Air  








Disposition:  ADMITTED AS INPATIENT


Condition:  Stable











Cynthia Cuevas DO May 28, 2019 18:54

## 2019-05-28 NOTE — NUR
TRANSFER TO FLOOR:

Patient transferred to [facility] as ordered, per MD Garcia.   Report given to 
Jeanne MONTES.  Belongings and medications given to Jeanne MONTES. Family and or S/O 
informed of transfer by pt.

## 2019-05-28 NOTE — NUR
ED Nurse Note:

Received report from Danika RN. Pt in bed resting, c/o abdominal pain 6/10 and 
asking for more pain Rx. Will ask ER mD. Will continue to monitor.

## 2019-05-28 NOTE — NUR
ED Nurse Note:



Pt came in from home due to abdominal pain with n/v x 2 days and bright red 
blood in stool since this morning. Dr. Garcia sent pt to the ER. pt is 
complaining of 10/10 pain. seen by maria elena, zofran given and iv morphine, pt able 
to give urine sample and was sent to lab, blood drawn and was sent to lab. will 
continue to monitor.

## 2019-05-28 NOTE — NUR
NURSE NOTES:

Pt received from ED originally for home. In stable condition on room air. Having abdominal 
pain however cannot administer more pain medication at this time. In no acute distress. Call 
light within reach. bed locked in lowest position, side rails x2, belongings form signed by 
patient

## 2019-05-29 NOTE — NUR
NURSE NOTES:

tried to insert new IV until get an PICC line tomorrow per patient request. no veins 
available to use.

## 2019-05-29 NOTE — GENERAL PROGRESS NOTE
Assessment/Plan


Assessment/Plan:


Assessment


- N/V


- abd pain


- diarrhea


- h/o diverticulosis





Recommendations


- clear liquids


- abx


- check stool Cx, C Diff





Subjective


Allergies:  


Coded Allergies:  


     TRAMADOL (Verified  Allergy, Intermediate, 4/4/18)


 TREMORS AND VOMITING


     CODEINE (Verified  Adverse Reaction, Mild, 1/25/13)


 nausea


Subjective


Feels better


improved N/V


improved Pain, but still needs IM morphine


improved diarrhea, but still loose


Difficulty with IV access





Objective





Last 24 Hour Vital Signs








  Date Time  Temp Pulse Resp B/P (MAP) Pulse Ox O2 Delivery O2 Flow Rate FiO2


 


5/29/19 16:00 99.2 78 18 122/69 (86) 100   


 


5/29/19 12:00 98.7 70 17 146/73 (97) 97   


 


5/29/19 09:00      Room Air  


 


5/29/19 08:02 98.5 84 16 151/61 (91) 96   


 


5/29/19 08:00 98.7 69 17 128/67 (87) 98   


 


5/29/19 04:00 99.2 85 18 139/68 (91) 99   


 


5/29/19 03:40 98.5       


 


5/29/19 03:40 98.5       


 


5/29/19 00:00 99.2 83 18 151/77 (101) 96   


 


5/28/19 23:00 98.5 84 16 151/61 96 Room Air  


 


5/28/19 22:47 98.4 84 16 151/61 96 Room Air  


 


5/28/19 22:30      Room Air  


 


5/28/19 22:00 98.5 89 16 162/85 94 Room Air  


 


5/28/19 22:00 98.5       


 


5/28/19 21:00 98.5 90 20 174/75 99 Room Air  





  90      

















Intake and Output  


 


 5/28/19 5/29/19





 19:00 07:00


 


Intake Total  1210 ml


 


Balance  1210 ml


 


  


 


Intake IV Total  1210 ml








Laboratory Tests


5/29/19 08:50: 


White Blood Count 5.3, Red Blood Count 3.46L, Hemoglobin 11.5L, Hematocrit 34.3L

, Mean Corpuscular Volume 99, Mean Corpuscular Hemoglobin 33.2H, Mean 

Corpuscular Hemoglobin Concent 33.5, Red Cell Distribution Width 11.1L, 

Platelet Count 224, Mean Platelet Volume 6.7, Neutrophils (%) (Auto) 60.9, 

Lymphocytes (%) (Auto) 26.8, Monocytes (%) (Auto) 11.0H, Eosinophils (%) (Auto) 

0.4, Basophils (%) (Auto) 0.9, Sodium Level 143, Potassium Level 3.4L, Chloride 

Level 107, Carbon Dioxide Level 29, Anion Gap 7, Blood Urea Nitrogen 22H, 

Creatinine 1.4H, Estimat Glomerular Filtration Rate , Glucose Level 105, 

Calcium Level 9.0, Total Bilirubin 0.9, Aspartate Amino Transf (AST/SGOT) 15, 

Alanine Aminotransferase (ALT/SGPT) 20, Alkaline Phosphatase 100, Lactate 

Dehydrogenase 138, Total Protein 6.6, Albumin 3.3L, Globulin 3.3, Albumin/

Globulin Ratio 1.0


Height (Feet):  5


Height (Inches):  4.00


Weight (Pounds):  185


Objective


Obese AA woman


NCAT


supple


CTA


RR 


abd soft ND, (+) diffuse TTP but less than yesterday


no edema











Blanca Couch MD May 29, 2019 20:33

## 2019-05-29 NOTE — DIAGNOSTIC IMAGING REPORT
Indication: Abdominal pain, nausea vomiting x2 days

 

Technique: Spiral acquisitions obtained through the abdomen and pelvis. No oral

contrast utilized, per emergency room physician request No IV contrast utilized,  per

referring physician request.. Multiplanar reconstructions were generated. Total dose

length product 853.13 mGycm. CTDIvol(s) 17.49 mGy. Dose reduction achieved using

automated exposure control

 

 

Comparison: 5/24/2017

 

Findings: There is fairly extensive colonic diverticulosis. There is equivocal

increased attenuation of the perisigmoid fat which could indicate very early acute

sigmoid diverticulitis. There is some wall thickening of the sigmoid colon, possibly

related to such or could indicate circular muscular hypertrophy related to the

diverticulosis.

 

The appendix is not definitely identified, but there are no findings to suggest acute

appendicitis. The distal esophagus, stomach, duodenum are all unremarkable. No free

or loculated intraperitoneal gas or fluid. No small bowel distention. There is a

fat-containing ventral hernia again demonstrated.

 

Lack of IV contrast limits assessment of the solid organs. The liver demonstrates a

subcentimeter low-attenuation lesion in segment 8 which is too small to characterize

which is evident previously and unchanged, presumably a benign cyst. The gallbladder

is unremarkable. No biliary ductal dilatation. The pancreas is fatty replaced. The

spleen, adrenals are all unremarkable. The left kidney again demonstrates a cyst in

the upper pole. The right kidney likewise again demonstrates an upper pole cyst. No

hydronephrosis, hydroureter, renal or ureteral calculi. Previously demonstrated

enhancing (prior study done with contrast) right renal mass is not evident on the

current exam. No pelvic mass or adenopathy. Uterus and adnexal structures appear

unremarkable. There is an incisional scar in the right lower quadrant which is not

evident previously.

 

The included lung bases demonstrate some atelectasis or scarring in the inferior

lingula. There is a 4 mm rectangular density in the right middle lobe, probably not

included on the previous exam but visible on earlier study of 2015 and unchanged.

Linear atelectasis or scarring is seen in the bilateral lower lobes.

 

The bones are unremarkable except for some mild degenerative spondylosis changes.

 

Impression: Possible early uncomplicated acute diverticulitis of the sigmoid.

 

Extensive colonic diverticulosis elsewhere

 

Note apparent absence of previously demonstrated right renal contrast enhancing mass.

This could indicate in visibility due to the lack of IV contrast, or could indicate

prior therapy. Correlate with clinical/surgical history

 

4 mm rectangular density in the right middle lobe, visible on earlier 2015 study and

unchanged, presumed benign

 

Incidental findings as noted, including mild degenerative spondylosis changes,

bilateral basilar pulmonary parenchymal atelectasis or scarring, right lower quadrant

incisional scar, bilateral upper pole renal cysts, right lobe liver cyst,

fat-containing ventral hernia

 

This agrees with the preliminary interpretation provided overnight by [facility]

teleradiology service.

 

The CT scanner at [facility] is accredited by the [employer] College of

Radiology and the scans are performed using protocols designed to limit radiation

exposure to as low as reasonably achievable to attain images of sufficient resolution

adequate for diagnostic evaluation.

## 2019-05-29 NOTE — NUR
HAND-OFF: 

Report given to Thu,RN Mildly to Moderately Impaired: difficulty hearing in some environments or speaker may need to increase volume or speak distinctly

## 2019-05-29 NOTE — CARDIOLOGY REPORT
--------------- APPROVED REPORT --------------





EKG Measurement

Heart [id number]

AL [id number]

[id number]

[id number]

[id number]





Sinus tachycardia

Left axis deviation



Abnormal ECG

## 2019-05-29 NOTE — NUR
73 Y/O FEMALE FROM HOME CAME TO [facility]



CC:ABDOMINAL PAIN



SI:DIVERTICULOSIS . INTRACTABLE PAIN AND SWELLING

VS: /75, P 101, T 98.5, RR 20, SpO2 96

RBC 4.17, BUN 26, CR 1.5

ABDOMINAL CT: Early uncomplicated acute diverticulitis of the sigmoid.



IS:FLAGYL 500mg

CIPROFLOXACIN 500mG

ZOFRAN 4mG

MORPHINE SULFATE 4mg

NS x1L IV



*****************ADMITTED TO MED/SURG******************



******************DCP: RETURN HOME*******************

## 2019-05-29 NOTE — NUR
NURSE NOTES:

received report from SIMON Stevens. patient in bed. a&Ox4 verbally responsive. no respiratory 
distress noted in room air. mild discomfort on abd. IV on RT wrist running fluid. NPO. bed 
in the lowest position . call light within reach. alarm on. will continue to monitor.

## 2019-05-29 NOTE — HISTORY AND PHYSICAL REPORT
DATE OF ADMISSION:  05/28/2019

HISTORY OF PRESENT ILLNESS:  The patient was admitted for gastrointestinal

bleed.  The patient has been complaining of vomiting blood as well as

rectal bleed.  The patient also has been having abdominal cramps that has

been going on for a week.  The patient was admitted for diverticulitis

flare that she has.   The patient denies chills.  Denies cough.  Denies

shortness of breath.  Denies orthopnea.



PAST MEDICAL HISTORY:  Significant for chest pain, history of

diverticulitis, history of renal cancer, constipation, hypertension,

chronic pain syndrome, hyperlipidemia, hypertension, and dyslipidemia.



PAST SURGICAL HISTORY:  Ovarian cyst removed, partial right nephrectomy for

cancer.



ALLERGIES:  Tramadol, codeine.



MEDICATIONS:  Lipitor, Colace, Toviaz, Cymbalta, Colace, diltiazem,

Crestor, hydrochlorothiazide.



SOCIAL HISTORY:  Denies history of smoking, alcohol, or illicit drugs.



REVIEW OF SYSTEMS:  HEENT:  Denies headaches.    RESPIRATORY:  Denies

shortness of breath.  Denies cough  CARDIOVASCULAR:  Denies chest pain.

GASTROINTESTINAL:  Reports vomiting blood and rectal bleed as well as

abdominal cramps for about one week.    EXTREMITIES:  Denies pain in the

lower extremity.   CNS:  No change in vision or speech pattern.



PHYSICAL EXAMINATION:

VITAL SIGNS:  Temperature 99.2, pulse 85, blood pressure 139/68.

HEENT:  PERRLA.

NECK:  Supple.  No lymphadenopathy.

CHEST:  Clear to auscultation.

CARDIOVASCULAR:  Regular rate and rhythm.  No murmurs or extra sounds.

GASTROINTESTINAL:  Diffuse abdominal pain.  Abdomen is soft.  No rebound.

No organomegaly.

EXTREMITIES:  A 1+ edema.  Reflexes equal in both sides.  Moving all four

extremities.  ______



LABORATORY STUDIES:  WBC of 8.2, hemoglobin 13.9, platelets 204.  Sodium

143, potassium 3.5, BUN of 26, creatinine 1.5.



ASSESSMENT:

1. Hematemesis.

2. Gastrointestinal bleed.

3. Vomiting.

4. Diverticulitis flare.

5. Azotemia.



I have asked Dr. Ely, Dr. Couch, Dr. Leonardo Jacob, as well as

Dr. Saenz to see the patient for pain management as well as for

diverticulitis treatment as well as for hypokalemia and azotemia

treatment.









  ______________________________________________

  Jose Alberto Garcia M.D.





DR:  Jackie

D:  05/29/2019 20:37

T:  05/29/2019 20:59

JOB#:  [id number]

CC:

## 2019-05-29 NOTE — NUR
NURSE NOTES:

RECEIVED PT FROM SIMON ABDI. PT IS AWAKE, AAO X4. C.O PAIN 10/10 ON ABDOMEN. NO IV ACCESS, 
DR. LUCAS AWARE.BED IS LOCKED AT THE LOWEST POSITION, BED ALARMS ACTIVE, SIDE RAILS UP X2, 
AND CALL LIGHT IS WITHIN REACH. WILL CONTINUE TO MONITOR

## 2019-05-29 NOTE — NUR
NURSE NOTES:

patient IV on right wrist inserted on 05/28 was clogged. she is hard stick. patient 
mentioned she used to have a PICC line. she is getting 1/2 NS 70/hr. NPO currently. notified 
Dr. vicente and waiting for further order.

## 2019-05-29 NOTE — NUR
NURSE NOTES:

Picc line was ordered 11am. called radiology for PICC line insertion. no one available in 
the radiology at this time. IT will be follow up tomorrow.

## 2019-05-29 NOTE — CONSULTATION
DATE OF CONSULTATION:  05/28/2019

GASTROENTEROLOGY CONSULTATION



CONSULTING PHYSICIAN:  Blanca Couch M.D.



CHIEF COMPLAINT:  I was asked to see this patient for evaluation

of abdominal problems.



HISTORY OF PRESENT ILLNESS:  The patient is a pleasant 74-year-old 

American woman who was in her usual state of health until about a week ago

when she started having some mild abdominal discomfort, but about 3 days

ago, her symptoms escalated and she had bouts of nausea, vomiting, and

diarrhea.  First few days, diarrhea was brown then subsequently some blood

was seen in it today only.  The patient stated that initially with bowel

movements, the bowel movement came out brown and then subsequently changed

color to red.  The patient had some chills over the last few days and some

other diffuse abdominal pain.  Her sister had some mild and vague

gastrointestinal complaints above 4 or 5 days ago as well.  They both ate

at the same restaurant prior to onset of the symptoms.  The patient has

had extensive previous gastrointestinal history, which is outlined

below.



PAST MEDICAL HISTORY:  History of renal cell carcinoma, status post right

nephrectomy.  Last endoscopy was normal in 2013 and last colonoscopy

showed diverticulosis more on the left side at that time.  Another

colonoscopy was done in 2016.    Status post another colonoscopy in

2016 showing only diverticulosis and a polyp, which was inflammatory and

was removed.  History of hypertension, obesity, elevated cholesterol,

chronic constipation, and reactive airway disease.



FAMILY HISTORY:  Positive for questionable history of Crohn disease in the

father.



SOCIAL HISTORY:  The patient is single.  She does not smoke or drink

alcohol.  She has a twin sister who is very much involved in her daily

life.



MEDICATIONS:  See chart list for details.



ALLERGIES:  Codeine and tramadol.



REVIEW OF SYSTEMS:  Otherwise negative.



PHYSICAL EXAMINATION:

GENERAL:  This is a pleasant  woman, seen in the emergency

room.

HEENT:  Normocephalic and atraumatic.  Sclerae anicteric.  Oropharynx

clear.

NECK:  Supple.

CHEST:  Clear to auscultation.

CARDIOVASCULAR:  Revealed a regular rate.

ABDOMEN:  Soft.  Diffusely tender without guarding or rebound.

EXTREMITIES:  Revealed no edema.



LABORATORY DATA:  Noted.



ASSESSMENT:  This patient presents with a several-day history of nausea,

vomiting, abdominal discomfort, diarrhea, which could be episode of

gastroenteritis.  However, there was some blood seen today and the blood

came after brown stools suggesting hemorrhoids.  The patient statement 

 is consistent with the same.  The patient's hematocrit is

essentially normal, which is typically seen in hemorrhoidal bleeding.  In

addition, she has had a colonoscopy only a few years ago, and therefore no

masses are suspected.  Diverticular disease may be a consideration, but

the presentation is more consistent with hemorrhoidal issues.  I would

check the patient's complete stool cultures first.  Should the patient's

bleeding continue, then further evaluation including colonoscopy can be

considered.



RECOMMENDATIONS:

1. Check complete stool cultures.

2. Pain control.

3. IV fluids.

4. Follow CBC.



Thank you for asking me to participate in the care of this patient.









  ______________________________________________

  Blanca Couch M.D.





DR:  RUBY

D:  05/28/2019 20:22

T:  05/29/2019 02:36

JOB#:  [id number]

CC:



MELANY

## 2019-05-30 NOTE — INFECTIOUS DISEASES PROG NOTE
"  SUBJECTIVE:   Truman Suero is a 58 year old male who presents to clinic today for the following health issues:    Prostate  Also requesting a lab draw for prostate.  Father and uncle were diagnosed with prostate cancers at age 60. Patient noted that he gets up at night to urinate, but drinks plenty of fluids at night. I discussed with the patient about false positive and false negative results.     Smoking Cessation   Follow up per MD, pt. Last cigarette was noted to be on March 20th, 2018 with Wellbutrin SR 150mg.  Wellbutrin was started in Feb 23, 2018, and patient has not noticed any changes (improvements/ or worsening symptoms) to anxiety or depression symptoms . Patient noted that after 38 years he was scared to quit, but he circled the date and quit. Patient denies having severe urges to smoke other than the habit of \"going outside and digging in his jacket pockets\". However, he reminds himself that he doesn't smoke.  Patient does not hang around people who smoke.     Patient has coughs once in while.     Of note patient is taking tramadol for chronic neck pain that is degenerative . He is taking 200mg and additional 50mg during worsening symptoms. He noted that pain flares intermittently.          Cardiovascular and Chronic Pain   Patient has a medical history of feet numbness and discoloration. He was revaluated with vascular specialists, and was told symptoms are non significant and that peripheral artery disease may be due to chronic back pain and not due to these symptoms. He noted improvements to leg symptoms after quitting smoking. Patient was referred to complete MRI on back and it was noted that he has similar degeneration of the back as he does at the neck. Patient is diagnosed with DDD of the cervical. Denies chest pain or shortness of breath.     Ascending Aortic dilation and mild calcification of the aorta was detected on chest CT in August, 2017. Patient plans to follow up with ECHO in the " Assessment/Plan


Assessment/Plan


IMPRESSION:  


Sigmoid diverticulitis.  


Hypertension, 


COPD,


asthma, 


chronic kidney disease, 


renal cell cancer, 


status post nephrectomy.





RECOMMENDATION:  


Continue IV Zosyn





Subjective


ROS Limited/Unobtainable:  No


Constitutional:  Reports: anorexia


Respiratory:  Reports: no symptoms


Cardiovascular:  Reports: no symptoms


Gastrointestinal/Abdominal:  Reports: other - abdoinal pain controlled


Genitourinary:  Reports: no symptoms


Allergies:  


Coded Allergies:  


     TRAMADOL (Verified  Allergy, Intermediate, 4/4/18)


 TREMORS AND VOMITING


     CODEINE (Verified  Adverse Reaction, Mild, 1/25/13)


 nausea





Objective


Vital Signs





Last 24 Hour Vital Signs








  Date Time  Temp Pulse Resp B/P (MAP) Pulse Ox O2 Delivery O2 Flow Rate FiO2


 


5/30/19 04:00 98.8 80 18 118/63 (81) 96   


 


5/30/19 00:00 99.6 81 20 135/63 (87) 96   


 


5/29/19 21:00      Room Air  


 


5/29/19 20:00 97.6 76 18 119/73 (88) 96   


 


5/29/19 16:00 99.2 78 18 122/69 (86) 100   








Height (Feet):  5


Height (Inches):  4.00


Weight (Pounds):  185


General Appearance:  no acute distress


HEENT:  mucous membranes moist


Respiratory/Chest:  lungs clear


Cardiovascular:  normal rate, other - left arm PICC line


Abdomen:  soft, non tender


Extremities:  no edema


Neurologic/Psychiatric:  alert, oriented x 3, responsive





Laboratory Tests








Test


  5/30/19


05:40


 


White Blood Count


  5.4 K/UL


(4.8-10.8)


 


Red Blood Count


  3.73 M/UL


(4.20-5.40)  L


 


Hemoglobin


  12.6 G/DL


(12.0-16.0)


 


Hematocrit


  37.5 %


(37.0-47.0)


 


Mean Corpuscular Volume


  100 FL (80-99)


H


 


Mean Corpuscular Hemoglobin


  33.6 PG


(27.0-31.0)  H


 


Mean Corpuscular Hemoglobin


Concent 33.5 G/DL


(32.0-36.0)


 


Red Cell Distribution Width


  10.9 %


(11.6-14.8)  L


 


Platelet Count


  209 K/UL


(150-450)


 


Mean Platelet Volume


  6.4 FL


(6.5-10.1)  L


 


Neutrophils (%) (Auto)


  58.7 %


(45.0-75.0)


 


Lymphocytes (%) (Auto)


  30.5 %


(20.0-45.0)


 


Monocytes (%) (Auto)


  7.9 %


(1.0-10.0)


 


Eosinophils (%) (Auto)


  1.1 %


(0.0-3.0)


 


Basophils (%) (Auto)


  1.8 %


(0.0-2.0)


 


Sodium Level


  141 MMOL/L


(136-145)


 


Potassium Level


  3.4 MMOL/L


(3.5-5.1)  L


 


Chloride Level


  104 MMOL/L


()


 


Carbon Dioxide Level


  29 MMOL/L


(21-32)


 


Anion Gap


  8 mmol/L


(5-15)


 


Blood Urea Nitrogen


  18 mg/dL


(7-18)


 


Creatinine


  1.5 MG/DL


(0.55-1.30)  H


 


Estimat Glomerular Filtration


Rate  mL/min (>60)  


 


 


Glucose Level


  158 MG/DL


()  H


 


Calcium Level


  9.5 MG/DL


(8.5-10.1)


 


Total Bilirubin


  1.1 MG/DL


(0.2-1.0)  H


 


Direct Bilirubin


  0.2 MG/DL


(0.0-0.3)


 


Aspartate Amino Transf


(AST/SGOT) 16 U/L (15-37)


 


 


Alanine Aminotransferase


(ALT/SGPT) 20 U/L (12-78)


 


 


Alkaline Phosphatase


  97 U/L


()


 


Total Protein


  7.2 G/DL


(6.4-8.2)


 


Albumin


  3.3 G/DL


(3.4-5.0)  L


 


Globulin 3.9 g/dL  


 


Albumin/Globulin Ratio


  0.8 (1.0-2.7)


L











Current Medications








 Medications


  (Trade)  Dose


 Ordered  Sig/George


 Route


 PRN Reason  Start Time


 Stop Time Status Last Admin


Dose Admin


 


 Acetaminophen


  (Tylenol)  650 mg  Q4H  PRN


 ORAL


 Mild Pain/Temp > 100.5  5/28/19 23:30


 6/27/19 23:29  5/29/19 03:10


 


 


 Chlorhexidine


 Gluconate


  (Rut-Hex 2%)  1 applic  DAILY@2000


 TOPIC


   5/29/19 20:00


 6/28/19 19:59  5/29/19 20:00


 


 


 Heparin Sodium/


 Sodium Chloride


  (Heparin 1000


 units/500ml


 Premix)  1,000 unit  ONCE  PRN


 INJ


 LINE  5/30/19 08:44


 5/30/19 23:59   


 


 


 Iopamidol


  (Isovue-300


 100ml)  100 ml  NOW  PRN


 INJ


 Radiology Procedure  5/28/19 20:30


     


 


 


 Lidocaine HCl


  (Xylocaine 1%


 30ml)  30 ml  ONCE  PRN


 INJ


 LINE  5/30/19 08:45


 5/30/19 23:59   


 


 


 Morphine Sulfate


  (Morphine


 Sulfate)  4 mg  Q4H  PRN


 IM


 Severe Pain (Pain Scale 7-10)  5/29/19 20:15


 6/5/19 20:14  5/30/19 11:10


 


 


 Ondansetron HCl


  (Zofran)  4 mg  Q6H  PRN


 IVP


 Nausea & Vomiting  5/29/19 00:45


 6/28/19 00:44  5/29/19 03:10


 


 


 Piperacillin Sod/


 Tazobactam Sod


 3.375 gm/Sodium


 Chloride  110 ml @ 


 27.5 mls/hr  Q8HR


 IVPB


   5/30/19 11:30


 6/6/19 11:29  5/30/19 11:41


 


 


 Sodium Chloride  1,000 ml @ 


 70 mls/hr  [id number]


 IV


   5/28/19 23:30


 6/27/19 23:29  5/30/19 11:41


 

















Leonardo Jacob MD May 30, 2019 14:23 future.      Of note Patient has a scheduled appointment with spine specialist tomorrow. Chronic pain  is managed by pain clinic.     Colonoscopy   Colonoscopy was completed and reviewed with patient.           Problem list and histories reviewed & adjusted, as indicated.  Additional history: as documented    Patient Active Problem List   Diagnosis     Chronic neck pain     Tobacco abuse     Cervical stenosis of spinal canal     DDD (degenerative disc disease), cervical     Cervical spondylosis without myelopathy     Chronic bilateral low back pain without sciatica     Myofascial pain     Mild CAD     Ascending aorta dilatation (H)     PAD (peripheral artery disease) (H)     No past surgical history on file.    Social History   Substance Use Topics     Smoking status: Former Smoker     Start date: 3/20/2018     Quit date: 3/20/2018     Smokeless tobacco: Never Used      Comment: tried Chantix, did not work well 2.13.18      Alcohol use No     Family History   Problem Relation Age of Onset     Prostate Cancer Father      Breast Cancer Maternal Grandmother      Prostate Cancer Other          Current Outpatient Prescriptions   Medication Sig Dispense Refill     buPROPion (WELLBUTRIN SR) 150 MG 12 hr tablet Take 1 tablet once daily and increase to 1 tablet twice daily after 4 to 7 days 60 tablet 0     fluticasone (FLONASE) 50 MCG/ACT spray Spray 2 sprays into both nostrils 2 times daily 2 Bottle 11     gabapentin (NEURONTIN) 600 MG tablet Take 1.5 tablets (900 mg) by mouth 3 times daily 135 tablet 3     ibuprofen (ADVIL,MOTRIN) 800 MG tablet   0     methocarbamol (ROBAXIN) 750 MG tablet Take 1-2 tablets (750-1,500 mg) by mouth 3 times daily as needed for muscle spasms caution sedation 130 tablet 1     topiramate (TOPAMAX) 50 MG tablet Take 50mg at bedtime.  Drink plenty of water and no alcohol. If side effects, then reduce to last tolerable dosage. 30 tablet 1     traMADol (ULTRAM) 50 MG tablet May take 1-2 tablets  "every 6 hours as needed for pain, max of 3 tabs per day. Reduce as able. Okay to fill on/after 5/3/2018 and start on/after 5/5/2018; To last 30 days. 90 tablet 0     traMADol (ULTRAM-ER) 200 MG ER-tablet Take 1 tablet (200 mg) by mouth daily Okay to fill on/after 5/3/2018 and start on/after 5/5/2018; to last 30 days 30 tablet 0     No Known Allergies  Recent Labs   Lab Test  08/23/17   1404  07/03/16   1151  06/13/16   1355  06/13/16   1339   LDL  97   --    --   103*   HDL  55   --    --   50   TRIG  65   --    --   47   ALT  29   --    --   28   CR  0.82  0.69   --   0.83   GFRESTIMATED  >90  >90  Non African American GFR Calc     --   >90  Non  GFR Calc     GFRESTBLACK  >90  >90  African American GFR Calc     --   >90   GFR Calc     POTASSIUM  4.4  3.8   --   4.2   TSH   --    --   0.65   --       BP Readings from Last 3 Encounters:   04/30/18 130/80   04/20/18 (!) 153/96   03/21/18 (!) 139/97    Wt Readings from Last 3 Encounters:   04/30/18 66.2 kg (146 lb)   04/20/18 66.2 kg (146 lb)   03/21/18 66.2 kg (146 lb)                  Labs reviewed in EPIC    Reviewed and updated as needed this visit by clinical staff  Tobacco  Allergies  Meds       Reviewed and updated as needed this visit by Provider         ROS:  Constitutional, HEENT, cardiovascular, pulmonary, GI, , musculoskeletal, neuro, skin, endocrine and psych systems are negative, except as otherwise noted.    This document serves as a record of the services and decisions personally performed and made by Humberto Trinidad D.O. It was created on her behalf by Jayjay Dexter, a trained medical scribe. The creation of this document is based on the provider's statements to the medical scribe.  Jayjay Dexter 1:35 PM April 17, 2018      OBJECTIVE:     /80 (BP Location: Right arm, Patient Position: Chair, Cuff Size: Adult Regular)  Pulse 68  Temp 97.6  F (36.4  C) (Oral)  Resp 16  Ht 1.702 m (5' 7\")  Wt 66.2 kg (146 lb) " " SpO2 97%  BMI 22.87 kg/m2  Body mass index is 22.87 kg/(m^2).  GENERAL: healthy, alert and no distress  EYES: Eyes grossly normal to inspection, PERRL and conjunctivae and sclerae normal  HENT: ear canals and TM's normal, nose and mouth without ulcers or lesions  NECK: no adenopathy, no asymmetry, masses, or scars and thyroid normal to palpation  RESP: lungs clear to auscultation - no rales, rhonchi or wheezes  CV: regular rate and rhythm, normal S1 S2, no S3 or S4, no murmur, click or rub, no peripheral edema and peripheral pulses strong  ABDOMEN: soft, nontender, no hepatosplenomegaly, no masses and bowel sounds normal  RECTAL (male): normal sphincter tone, no rectal masses and prostate 1+ enlarged, nontender  MS: no gross musculoskeletal defects noted, no edema  SKIN: no suspicious lesions or rashes  NEURO: Normal strength and tone, mentation intact and speech normal  PSYCH: mentation appears normal, affect normal/bright    Diagnostic Test Results:  No results found for this or any previous visit (from the past 24 hour(s)).    ASSESSMENT/PLAN:       BP Screening:   Last 3 BP Readings:    BP Readings from Last 3 Encounters:   04/30/18 130/80   04/20/18 (!) 153/96   03/21/18 (!) 139/97       The following was recommended to the patient:  Re-screen BP within a year and recommended lifestyle modifications  Tobacco Cessation:   reports that he quit smoking about 5 weeks ago. He started smoking about 5 weeks ago. He has never used smokeless tobacco.      BMI:   Estimated body mass index is 22.87 kg/(m^2) as calculated from the following:    Height as of this encounter: 1.702 m (5' 7\").    Weight as of this encounter: 66.2 kg (146 lb).         1. Tobacco abuse  Patient will continue taking as directed for tobacco abuse in the past.  In 3 to 6 months we may begin to wean down.   - buPROPion (WELLBUTRIN SR) 150 MG 12 hr tablet; Take 1 tablet once daily and increase to 1 tablet twice daily after 4 to 7 days  Dispense: " 60 tablet; Refill: 0    2. Adjustment disorder with anxious mood  Wellbutrin was noted to not change mood symptoms. He will continue to take as directed.   - buPROPion (WELLBUTRIN SR) 150 MG 12 hr tablet; Take 1 tablet once daily and increase to 1 tablet twice daily after 4 to 7 days  Dispense: 60 tablet; Refill: 0    3. Ascending aorta dilatation (H)  Patient will follow up diagnosis with ECHO.   - Echocardiogram Complete; Future    4. Diverticulitis of colon  Reviewed colonoscopy, no sx.      5. Chronic bilateral low back pain without sciatica-follows spine surgery and pain clinic    6. DDD (degenerative disc disease), cervical  Managed by pain clinic. Patient has a scheduled appointment with spine specialist tomorrow. He will continue to take Tramadol as directed for pain.     7. Mild CAD  Stable. Medical management, ECHO will be completed in the future.     8. Special screening for malignant neoplasm of prostate  Screening today due to having family history of prostate cancer.     9. Screening for human immunodeficiency virus  Screening today.   - HIV Antigen Antibody Combo    Patient instructions discussed with patient    The information in this document, created by the medical scribe for me, accurately reflects the services I personally performed and the decisions made by me. I have reviewed and approved this document for accuracy prior to leaving the patient care area.  April 30, 2018 3:05 PM      Humberto Trinidad DO  [facility]

## 2019-05-30 NOTE — CONSULTATION
DATE OF CONSULTATION:  05/29/2019

INFECTIOUS DISEASES CONSULTATION



CONSULTING PHYSICIAN:  Leonardo Jacob M.D.



PRIMARY ATTENDING PHYSICIAN:  Jose Alberto Garcia M.D.



REASON FOR CONSULTATION:  Diverticulitis.



HISTORY OF PRESENT ILLNESS:  This is a 74-year-old -American female,

admitted last night complaining of abdominal pain.  These symptoms started

one week ago with mild abdominal discomfort.  In the past three days, it

has become worse associated with nausea, vomiting, and diarrhea.  She had

some bloody diarrhea day before admission.



PAST MEDICAL HISTORY:  Significant for renal cell cancer, status post right

nephrectomy; seizure disorder; diabetes mellitus; asthma; COPD;

hypertension; obesity; history of colon polyp, diverticulosis.



ALLERGIES:  Allergic to codeine and tramadol.



MEDICATIONS:  Morphine, heparin, Zofran, sodium chloride. one dose of

Cipro and Flagyl in the ER.



SOCIAL HISTORY:  Single, lives at home.  Denies alcohol, drug abuse, or

smoking.



REVIEW OF SYSTEMS:  No fever.  No chills.  She has nausea at the present

time.  Abdominal pain that is diffuse, more in the lower part.  No problem

passing urine.



PHYSICAL EXAMINATION:

VITAL SIGNS:  Temperature 98.7, pulse 70, blood pressure 146/73.

GENERAL APPEARANCE:  well developed no acute distress.

HEAD AND NECK:  Pink conjunctivae.

HEART:  S1 and S2.  Regular.

LUNGS:  Clear.

ABDOMEN:  Soft and nontender.

EXTREMITIES:  no edema.

NEUROLOGIC:  Awake, alert, and oriented x3.



LABORATORY AND DIAGNOSTIC DATA:  Sodium 143, potassium 3.4, chloride 10

bicarbonate 29, BUN 22, creatinine 1.4, glucose 105.  WBC 5.3, hemoglobin

11.5, hematocrit 34.8, and platelets 224.  UA was negative for rbc, wbc,

and nitrite.



The patient had a CT scan of the abdomen and pelvis, possible early

acute diverticulitis.



IMPRESSION:  Sigmoid diverticulitis.  The patient has hypertension, COPD,

asthma, chronic kidney disease, renal cell cancer, status post

nephrectomy.



RECOMMENDATION:  We will start the patient on IV Zosyn.  We will follow up

the patient clinically.



At the end of my exam, I thank Dr. Garcia for involving me in the care

of this patient.









  ______________________________________________

  Leonardo Jacob M.D.





DR:  ELOISE

D:  05/29/2019 14:42

T:  05/30/2019 03:31

JOB#:  [id number]

CC:



MELANY

## 2019-05-30 NOTE — GENERAL PROGRESS NOTE
Assessment/Plan


Problem List:  


(1) Abdominal pain


ICD Codes:  R10.9 - Unspecified abdominal pain


SNOMED:  36825606


(2) Colitis


ICD Codes:  K52.9 - Noninfective gastroenteritis and colitis, unspecified


SNOMED:  724380443


(3) Abdominal pain


ICD Codes:  R10.9 - Unspecified abdominal pain


SNOMED:  67384223, 627781781


(4) S/p nephrectomy


ICD Codes:  Z90.5 - Acquired absence of kidney


SNOMED:  09055541, 847882291


(5) Diverticulitis


ICD Codes:  K57.92 - Diverticulitis of intestine, part unspecified, without 

perforation or abscess without bleeding


SNOMED:  677160483


(6) Intractable pain


ICD Codes:  R52 - Pain, unspecified


SNOMED:  45666283


Status:  progressing


Assessment/Plan:


diverticulitis


abdominal pain


afebrile


no gi bleed


intractable pain


pain management per dr boateng





Subjective


Gastrointestinal/Abdominal:  Reports: abdominal pain


Allergies:  


Coded Allergies:  


     TRAMADOL (Verified  Allergy, Intermediate, 4/4/18)


 TREMORS AND VOMITING


     CODEINE (Verified  Adverse Reaction, Mild, 1/25/13)


 nausea





Objective





Last 24 Hour Vital Signs








  Date Time  Temp Pulse Resp B/P (MAP) Pulse Ox O2 Delivery O2 Flow Rate FiO2


 


5/30/19 16:00 98.3 70 17 146/65 (92) 97   


 


5/30/19 12:00 98.6 69 18 124/55 (78) 100   


 


5/30/19 09:00      Room Air  


 


5/30/19 08:00 98.1 72 18 133/66 (88) 94   


 


5/30/19 04:00 98.8 80 18 118/63 (81) 96   


 


5/30/19 00:00 99.6 81 20 135/63 (87) 96   

















Intake and Output  


 


 5/29/19 5/30/19





 19:00 07:00


 


Intake Total 280 ml 


 


Balance 280 ml 


 


  


 


IV Total 280 ml 


 


# Voids 3 2








Laboratory Tests


5/30/19 05:40: 


White Blood Count 5.4, Red Blood Count 3.73L, Hemoglobin 12.6, Hematocrit 37.5, 

Mean Corpuscular Volume 100H, Mean Corpuscular Hemoglobin 33.6H, Mean 

Corpuscular Hemoglobin Concent 33.5, Red Cell Distribution Width 10.9L, 

Platelet Count 209, Mean Platelet Volume 6.4L, Neutrophils (%) (Auto) 58.7, 

Lymphocytes (%) (Auto) 30.5, Monocytes (%) (Auto) 7.9, Eosinophils (%) (Auto) 

1.1, Basophils (%) (Auto) 1.8, Sodium Level 141, Potassium Level 3.4L, Chloride 

Level 104, Carbon Dioxide Level 29, Anion Gap 8, Blood Urea Nitrogen 18, 

Creatinine 1.5H, Estimat Glomerular Filtration Rate , Glucose Level 158H, 

Calcium Level 9.5, Total Bilirubin 1.1H, Direct Bilirubin 0.2, Aspartate Amino 

Transf (AST/SGOT) 16, Alanine Aminotransferase (ALT/SGPT) 20, Alkaline 

Phosphatase 97, Total Protein 7.2, Albumin 3.3L, Globulin 3.9, Albumin/Globulin 

Ratio 0.8L


Height (Feet):  5


Height (Inches):  4.00


Weight (Pounds):  185


Respiratory/Chest:  lungs clear


Abdomen:  soft











Jose Alberto Garcia MD May 30, 2019 21:28

## 2019-05-30 NOTE — NUR
NURSE NOTES:

RECEIVED PT FROM SIMON CABRERA. PT IS AWAKE, AAO X2. DENIES PAIN AT THE MOMENT. ON ROOM AIR, NO 
ACUTE DISTRESS NOTED. COMMODE AT BEDSIDE. PICC LINE ON LEFT UPPER ARM DOUBLE LUMEN NOTED. 
DRESSING IS DRY AND INTACT, ONLY ONE LUMEN IS PATENT, CHARGE NURSE AWARE. PT REPORTED THAT 
THE LUMEN THAT IS NOT PATENT HAS NOT BEEN USED. BED IS LOCKED AT THE LOWEST POSITION, BED 
ALARMS ACTIVE, SIDE RAILS UP X2 AND CALL LIGHT IS WITHIN REACH. WILL CONTINUE TO MONITOR.

## 2019-05-30 NOTE — NUR
NURSE NOTES:

Dr. Garcia and Dr. Dumont notified of K 3.4 level. Order received. Patient updated on new 
order, verbalized understanding. Administered Potassium Chloride 40 MEQ PO x1 as ordered, 
tolerated well.

## 2019-05-30 NOTE — NUR
NURSE NOTES:

Patient sent down to radiology via bed at 0900 for PICC line insertion. Returned at 1010. 
Left upper arm PICC noted, dressing CDI. Flushed both lumens, no bleeding or leakage. Will 
continue to monitor.

## 2019-05-30 NOTE — DIAGNOSTIC IMAGING REPORT
Indications: Needs long-term IV access

 

Technique: Ultrasound confirms patent compressible left brachial vein. Total sterile

technique, including  sterile probe cover and sterile gel, hat, mask, sterile gown,

large sterile drape, and preparation with 2% chlorhexidine utilized. Local anesthesia

with 1% lidocaine. Under real-time ultrasound guidance, puncture brachial vein using

21-gauge needle, documented and archived, passage 0.018 guidewire under direct

fluoroscopy, which was used to determine appropriate catheter length, exchange for 4

[place] peel-away sheath. 4 [place] Bard  dual-lumen power PICC cut to 46 cm. It was

inserted through the peel-away sheath. Peel-away sheath and guidewire removed.

Catheter fixed to the skin. Both catheter ports aspirated and flushed. Patient

tolerated procedure well, without immediate complication. Digital radiograph

documents satisfactory catheter tip position, at the cavoatrial junction. 

 

Total fluoroscopy time 16.3 seconds.

Total dose area product  0.68728  mGym2

Total number of images: 1

 

Impression: Successful placement of left arm PICC under sonographic and fluoroscopic

guidance, as described above.

## 2019-05-30 NOTE — GENERAL PROGRESS NOTE
Assessment/Plan


Status:  progressing


Assessment/Plan:


Assessment


- N/V - improved, tolerating liquids


- abd pain - improved


- diarrhea


- h/o diverticulosis





Recommendations


- clear liquids


- abx


- check stool Cx, C Diff





Subjective


Allergies:  


Coded Allergies:  


     TRAMADOL (Verified  Allergy, Intermediate, 4/4/18)


 TREMORS AND VOMITING


     CODEINE (Verified  Adverse Reaction, Mild, 1/25/13)


 nausea


Subjective


Feels better


improved N/V


improved Pain, but still needs IM morphine


improved diarrhea, but still loose


now with PICC line





Objective





Last 24 Hour Vital Signs








  Date Time  Temp Pulse Resp B/P (MAP) Pulse Ox O2 Delivery O2 Flow Rate FiO2


 


5/30/19 16:00 98.3 70 17 146/65 (92) 97   


 


5/30/19 12:00 98.6 69 18 124/55 (78) 100   


 


5/30/19 09:00      Room Air  


 


5/30/19 08:00 98.1 72 18 133/66 (88) 94   


 


5/30/19 04:00 98.8 80 18 118/63 (81) 96   


 


5/30/19 00:00 99.6 81 20 135/63 (87) 96   

















Intake and Output  


 


 5/29/19 5/30/19





 19:00 07:00


 


Intake Total 280 ml 


 


Balance 280 ml 


 


  


 


IV Total 280 ml 


 


# Voids 3 2








Laboratory Tests


5/30/19 05:40: 


White Blood Count 5.4, Red Blood Count 3.73L, Hemoglobin 12.6, Hematocrit 37.5, 

Mean Corpuscular Volume 100H, Mean Corpuscular Hemoglobin 33.6H, Mean 

Corpuscular Hemoglobin Concent 33.5, Red Cell Distribution Width 10.9L, 

Platelet Count 209, Mean Platelet Volume 6.4L, Neutrophils (%) (Auto) 58.7, 

Lymphocytes (%) (Auto) 30.5, Monocytes (%) (Auto) 7.9, Eosinophils (%) (Auto) 

1.1, Basophils (%) (Auto) 1.8, Sodium Level 141, Potassium Level 3.4L, Chloride 

Level 104, Carbon Dioxide Level 29, Anion Gap 8, Blood Urea Nitrogen 18, 

Creatinine 1.5H, Estimat Glomerular Filtration Rate , Glucose Level 158H, 

Calcium Level 9.5, Total Bilirubin 1.1H, Direct Bilirubin 0.2, Aspartate Amino 

Transf (AST/SGOT) 16, Alanine Aminotransferase (ALT/SGPT) 20, Alkaline 

Phosphatase 97, Total Protein 7.2, Albumin 3.3L, Globulin 3.9, Albumin/Globulin 

Ratio 0.8L


Height (Feet):  5


Height (Inches):  4.00


Weight (Pounds):  185


Objective


Obese AA woman


NCAT


supple


CTA


RR 


abd soft ND, (+) diffuse TTP but less than yesterday


no edema











Blanca Couch MD May 30, 2019 21:48

## 2019-05-30 NOTE — CONSULTATION
History of Present Illness


General


Date patient seen:  May 30, 2019


Time patient seen:  07:00 - am


Chief Complaint:  Low back and abdominal pain


Referring physician:  cinthia


Reason for Consultation:  pain management





Present Illness


HPI


Patient has been admitted under the care of Dr. Garcia c/o abdominal pain and 

is being seen by GI and ID specialists. She also is c/o low back pain which she 

says is chronic and has no new changes. The patient was started on Morphine 4mg 

IV Q4H PRN which has helped patient to tolerate the pain.


Allergies:  


Coded Allergies:  


     TRAMADOL (Verified  Allergy, Intermediate, 4/4/18)


 TREMORS AND VOMITING


     CODEINE (Verified  Adverse Reaction, Mild, 1/25/13)


 nausea





Medication History


Scheduled


Albuterol Sulfate* (Proair Hfa*), 2 PUFFS INH Q4, (Reported)


Atorvastatin Calcium* (Lipitor*), 20 MG ORAL BEDTIME, (Reported)


Azelastine/Fluticasone (Dymista Nasal Spray), 2 SPRAYS NS BID, (Reported)


Cyclobenzaprine Hcl* (Flexeril*), 5 MG ORAL DA, (Reported)


Dexlansoprazole (Dexilant), 60 MG ORAL DAILY, (Reported)


Diltiazem Hcl (Diltiazem Er), 240 MG PO DAILY, (Reported)


Docusate Sodium* (Docusate Sodium*), 100 MG ORAL TWICE A DAY, (Reported)


Duloxetine Hcl* (Cymbalta*), 30 MG ORAL DAILY, (Reported)


Fesoterodine Fumarate (Toviaz), 4 MG PO BID, (Reported)


Fluticasone/Vilanterol (Breo Ellipta 100-25 Mcg INH), 1 PUFF IH DAILY, (Reported

)


Hydralazine Hcl* (Hydralazine Hcl*), 25 MG ORAL BID, (Reported)


Hydrochlorothiazide (Microzide), 25 MG PO DAILY, (Reported)


Rosuvastatin Calcium* (Crestor*), 20 MG ORAL DAILY, (Reported)


Triamterene/Hydrochlorothiazid (Triamterene-Hctz 37.5-25 Mg Cp), 1 CAP ORAL 

DAILY, (Reported)





Scheduled PRN


Diazepam* (Diazepam*), 5 MG ORAL BID PRN for ANXIETY, (Reported)


Hydrocodone Bit/Acetaminophen 5-325* (Norco 5-325*), 1 TAB ORAL Q4H PRN for For 

Pain, (Reported)


Hydrocodone Bit/Acetaminophen 5-325* (Norco 5-325*), 2 TAB ORAL Q4H PRN for For 

Pain, (Reported)





Patient History


Healthcare decision maker





Resuscitation status


Full Code


Advanced Directive on File








Past Medical/Surgical History


Past Medical/Surgical History:  


(1) Abdominal pain


(2) S/p nephrectomy





Review of Systems


ROS Narrative


REVIEW OF SYSTEMS:   Denies rash, fever, chills, sweating,


dizziness, drowsiness, blurry vision, sore throat, change of


hearing or weight.  No shortness of breath, chest pain,


palpitations or cough.  No nausea or vomiting, but complains of


abdominal pain.  No constipation or diarrhea.  No blood in the


stool or the urine.  No bowel or bladder incontinence.  No


dysuria.  She complains of back pain with radiation to the left


leg.  No weakness or numbness in any extremity.





Physical Exam


Physical Exam Narrative


GENERAL:  Alert, awake, oriented x3.


HEENT:  PERRLA.


NECK:  Range of motion full.  There is no tenderness in the


paracervical muscles.  No adenopathy.


LUNGS:  Clear.


HEART:  S1 and S2, regular.


ABDOMEN:  Mild tenderness, generalized.  Bowel sounds present.


BACK:  Range of motion deferred to be examined.  There is


bilateral paraspinal muscle tenderness in the lumbar area.  No


tenderness in the trapezius or rhomboid muscles.


UPPER EXTREMITIES:  Upper extremity range of motion full in all


directions.  No cyanosis, no clubbing, no edema.  Motor 4/5


bilaterally in all muscles.  Sensory intact.  Reflexes normal.


No adenopathy.


LOWER EXTREMITIES:  Lower extremity range of motion full in all


directions.  No cyanosis, no clubbing, no edema.  Motor 4/5


bilaterally in all muscles.  Sensory intact.  Reflexes normal.


No adenopathy.





Last 24 Hour Vital Signs








  Date Time  Temp Pulse Resp B/P (MAP) Pulse Ox O2 Delivery O2 Flow Rate FiO2


 


5/30/19 04:00 98.8 80 18 118/63 (81) 96   


 


5/30/19 00:00 99.6 81 20 135/63 (87) 96   


 


5/29/19 21:00      Room Air  


 


5/29/19 20:00 97.6 76 18 119/73 (88) 96   


 


5/29/19 16:00 99.2 78 18 122/69 (86) 100   


 


5/29/19 12:00 98.7 70 17 146/73 (97) 97   


 


5/29/19 09:00      Room Air  

















Intake and Output  


 


 5/29/19 5/30/19





 19:00 07:00


 


Intake Total 280 ml 


 


Balance 280 ml 


 


  


 


Intake IV Total 280 ml 


 


# Voids 3 2











Laboratory Tests








Test


  5/30/19


05:40


 


White Blood Count


  5.4 K/UL


(4.8-10.8)


 


Red Blood Count


  3.73 M/UL


(4.20-5.40)  L


 


Hemoglobin


  12.6 G/DL


(12.0-16.0)


 


Hematocrit


  37.5 %


(37.0-47.0)


 


Mean Corpuscular Volume


  100 FL (80-99)


H


 


Mean Corpuscular Hemoglobin


  33.6 PG


(27.0-31.0)  H


 


Mean Corpuscular Hemoglobin


Concent 33.5 G/DL


(32.0-36.0)


 


Red Cell Distribution Width


  10.9 %


(11.6-14.8)  L


 


Platelet Count


  209 K/UL


(150-450)


 


Mean Platelet Volume


  6.4 FL


(6.5-10.1)  L


 


Neutrophils (%) (Auto)


  58.7 %


(45.0-75.0)


 


Lymphocytes (%) (Auto)


  30.5 %


(20.0-45.0)


 


Monocytes (%) (Auto)


  7.9 %


(1.0-10.0)


 


Eosinophils (%) (Auto)


  1.1 %


(0.0-3.0)


 


Basophils (%) (Auto)


  1.8 %


(0.0-2.0)


 


Sodium Level


  141 MMOL/L


(136-145)


 


Potassium Level


  3.4 MMOL/L


(3.5-5.1)  L


 


Chloride Level


  104 MMOL/L


()


 


Carbon Dioxide Level


  29 MMOL/L


(21-32)


 


Anion Gap


  8 mmol/L


(5-15)


 


Blood Urea Nitrogen


  18 mg/dL


(7-18)


 


Creatinine


  1.5 MG/DL


(0.55-1.30)  H


 


Estimat Glomerular Filtration


Rate  mL/min (>60)  


 


 


Glucose Level


  158 MG/DL


()  H


 


Calcium Level


  9.5 MG/DL


(8.5-10.1)


 


Total Bilirubin


  1.1 MG/DL


(0.2-1.0)  H


 


Direct Bilirubin


  0.2 MG/DL


(0.0-0.3)


 


Aspartate Amino Transf


(AST/SGOT) 16 U/L (15-37)


 


 


Alanine Aminotransferase


(ALT/SGPT) 20 U/L (12-78)


 


 


Alkaline Phosphatase


  97 U/L


()


 


Total Protein


  7.2 G/DL


(6.4-8.2)


 


Albumin


  3.3 G/DL


(3.4-5.0)  L


 


Globulin 3.9 g/dL  


 


Albumin/Globulin Ratio


  0.8 (1.0-2.7)


L








Height (Feet):  5


Height (Inches):  4.00


Weight (Pounds):  185


Medications





Current Medications








 Medications


  (Trade)  Dose


 Ordered  Sig/George


 Route


 PRN Reason  Start Time


 Stop Time Status Last Admin


Dose Admin


 


 Acetaminophen


  (Tylenol)  650 mg  Q4H  PRN


 ORAL


 Mild Pain/Temp > 100.5  5/28/19 23:30


 6/27/19 23:29  5/29/19 03:10


 


 


 Chlorhexidine


 Gluconate


  (Rut-Hex 2%)  1 applic  DAILY@2000


 TOPIC


   5/29/19 20:00


 6/28/19 19:59  5/29/19 20:00


 


 


 Heparin Sodium/


 Sodium Chloride


  (Heparin 1000


 units/500ml


 Premix)  1,000 unit  ONCE  PRN


 INJ


 LINE  5/30/19 08:44


 5/30/19 23:59   


 


 


 Iopamidol


  (Isovue-300


 100ml)  100 ml  NOW  PRN


 INJ


 Radiology Procedure  5/28/19 20:30


     


 


 


 Lidocaine HCl


  (Xylocaine 1%


 30ml)  30 ml  ONCE  PRN


 INJ


 LINE  5/30/19 08:45


 5/30/19 23:59   


 


 


 Morphine Sulfate


  (Morphine


 Sulfate)  4 mg  Q4H  PRN


 IM


 Severe Pain (Pain Scale 7-10)  5/29/19 20:15


 6/5/19 20:14  5/30/19 04:49


 


 


 Ondansetron HCl


  (Zofran)  4 mg  Q6H  PRN


 IVP


 Nausea & Vomiting  5/29/19 00:45


 6/28/19 00:44  5/29/19 03:10


 


 


 Piperacillin Sod/


 Tazobactam Sod


 3.375 gm/Sodium


 Chloride  110 ml @ 


 27.5 mls/hr  Q8H


 IVPB


   5/29/19 16:00


 6/5/19 15:59   


 


 


 Sodium Chloride  1,000 ml @ 


 70 mls/hr  [id number]


 IV


   5/28/19 23:30


 6/27/19 23:29  5/29/19 03:08


 











Assessment/Plan


Assessment/Plan:


(1) Abdominal pain


(2) Diverticulitis 


(3) Lumbar Radiculopathy


(4) Lumbar DDD


Patient to be continued on Morphine as needed


D/w Dr. Saenz and he concurred.











Spencer Murphy May 30, 2019 08:50

## 2019-05-30 NOTE — NUR
NURSE NOTES:

Report received from Thu RN, rounds made. Patient resting in semi-fowlers position in bed. 
No c/o pain, NV, SOB on RA. Patient scheduled for PICC line insertion today, patient aware, 
consent is signed. Bilateral SCDs on. No active bleeding noted at this time. Up to BSC with 
assistance, no BM. Call light in reach, bed in lowest position, will continue to monitor.

## 2019-05-31 NOTE — NUR
NURSE NOTES:

Discharge packet prepared , pt does not want to go to SNF , nurse taking over care informed 
. Follow up required for discharge

## 2019-05-31 NOTE — GENERAL PROGRESS NOTE
Assessment/Plan


Status:  progressing


Assessment/Plan:


Assessment


- N/V - improved, tolerating po


- abd pain - improved


- diarrhea


- h/o diverticulosis





Recommendations


- Advance diet


- abx


- check stool Cx, C Diff (await BM)


- OOB





Subjective


Allergies:  


Coded Allergies:  


     TRAMADOL (Verified  Allergy, Intermediate, 4/4/18)


 TREMORS AND VOMITING


     CODEINE (Verified  Adverse Reaction, Mild, 1/25/13)


 nausea


Subjective


Feels better


improved N/V


improved Pain,





Objective





Last 24 Hour Vital Signs








  Date Time  Temp Pulse Resp B/P (MAP) Pulse Ox O2 Delivery O2 Flow Rate FiO2


 


5/31/19 16:00 99.8 76 20 145/74 (97) 97   


 


5/31/19 12:43 97.9       


 


5/31/19 12:00 98.8  21 167/85 (112) 99   


 


5/31/19 12:00 98.8 72 21 167/85 (112) 99   


 


5/31/19 09:00      Room Air  


 


5/31/19 08:00 97.9  18 149/72 (97) 97   


 


5/31/19 04:00 98.7 70 16 132/60 (84) 98   


 


5/31/19 00:00 98.5 65 18 131/61 (84) 96   

















Intake and Output  


 


 5/30/19 5/31/19





 19:00 07:00


 


Intake Total 760 ml 880.0 ml


 


Balance 760 ml 880.0 ml


 


  


 


Intake Oral 480 ml 


 


IV Total 280 ml 880.0 ml


 


# Voids 3 1








Laboratory Tests


5/31/19 10:00: 


White Blood Count 6.1, Red Blood Count 3.85L, Hemoglobin 12.8, Hematocrit 38.3, 

Mean Corpuscular Volume 99, Mean Corpuscular Hemoglobin 33.2H, Mean Corpuscular 

Hemoglobin Concent 33.4, Red Cell Distribution Width 10.8L, Platelet Count 200, 

Mean Platelet Volume 6.4L, Neutrophils (%) (Auto) 72.4, Lymphocytes (%) (Auto) 

16.8L, Monocytes (%) (Auto) 7.9, Eosinophils (%) (Auto) 1.1, Basophils (%) (Auto

) 1.7, Sodium Level 141, Potassium Level 4.1, Chloride Level 106, Carbon 

Dioxide Level 29, Anion Gap 6, Blood Urea Nitrogen 13, Creatinine 1.5H, Estimat 

Glomerular Filtration Rate , Glucose Level 121H, Hemoglobin A1c 5.3, Uric Acid 

5.2, Calcium Level 9.3, Phosphorus Level 2.6, Magnesium Level 1.8, Total 

Bilirubin 1.2H, Direct Bilirubin 0.3, Gamma Glutamyl Transpeptidase 24, 

Aspartate Amino Transf (AST/SGOT) 19, Alanine Aminotransferase (ALT/SGPT) 20, 

Alkaline Phosphatase 97, Total Creatine Kinase 93, C-Reactive Protein, 

Quantitative 3.0H, Total Protein 7.2, Albumin 3.4, Globulin 3.8, Albumin/

Globulin Ratio 0.9L, Triglycerides Level 93, Cholesterol Level 128, LDL 

Cholesterol 69, HDL Cholesterol 42, Cholesterol/HDL Ratio 3.0L, Thyroid 

Stimulating Hormone (TSH) 1.254


Height (Feet):  5


Height (Inches):  4.00


Weight (Pounds):  185


Objective


Obese AA woman


NCAT


supple


CTA


RR 


abd soft ND, (+) diffuse TTP but less than yesterday


no edema











Blanca Couch MD May 31, 2019 21:43

## 2019-05-31 NOTE — NUR
NURSE NOTES:

Pt to be discharged to [facility] under the direction of Dr Ortega . Resume all home 
medications, d/c hospital medication. D/C Zosyn IV change to Rocephin IV 1 gram 24 hours x 7 
days diverticulitis

## 2019-05-31 NOTE — CONSULTATION
History of Present Illness


General


Date patient seen:  May 31, 2019


Chief Complaint:  Abdominal Pain


Referring physician:  cinthia


Reason for Consultation:  pain management





Present Illness


Allergies:  


Coded Allergies:  


     TRAMADOL (Verified  Allergy, Intermediate, 18)


 TREMORS AND VOMITING


     CODEINE (Verified  Adverse Reaction, Mild, 13)


 nausea





Medication History


Scheduled


Albuterol Sulfate* (Proair Hfa*), 2 PUFFS INH Q4, (Reported)


Atorvastatin Calcium* (Lipitor*), 20 MG ORAL BEDTIME, (Reported)


Azelastine/Fluticasone (Dymista Nasal Spray), 2 SPRAYS NS BID, (Reported)


Ceftriaxone Sodium (Ceftriaxone), 1 GM IV DAILY, (Reported)


Cyclobenzaprine Hcl* (Flexeril*), 5 MG ORAL DA, (Reported)


Dexlansoprazole (Dexilant), 60 MG ORAL DAILY, (Reported)


Diltiazem Hcl (Diltiazem Er), 240 MG PO DAILY, (Reported)


Docusate Sodium* (Docusate Sodium*), 100 MG ORAL TWICE A DAY, (Reported)


Duloxetine Hcl* (Cymbalta*), 30 MG ORAL DAILY, (Reported)


Fesoterodine Fumarate (Toviaz), 4 MG PO BID, (Reported)


Fluticasone/Vilanterol (Breo Ellipta 100-25 Mcg INH), 1 PUFF IH DAILY, (Reported

)


Hydralazine Hcl* (Hydralazine Hcl*), 25 MG ORAL BID, (Reported)


Hydrochlorothiazide (Microzide), 25 MG PO DAILY, (Reported)


Rosuvastatin Calcium* (Crestor*), 20 MG ORAL DAILY, (Reported)


Triamterene/Hydrochlorothiazid (Triamterene-Hctz 37.5-25 Mg Cp), 1 CAP ORAL 

DAILY, (Reported)





Scheduled PRN


Diazepam* (Diazepam*), 5 MG ORAL BID PRN for ANXIETY, (Reported)


Hydrocodone Bit/Acetaminophen 5-325* (Norco 5-325*), 1 TAB ORAL Q4H PRN for For 

Pain, (Reported)


Hydrocodone Bit/Acetaminophen 5-325* (Norco 5-325*), 2 TAB ORAL Q4H PRN for For 

Pain, (Reported)





Miscellaneous Medications


Ceftriaxone Sod (Rocephin), 250 MG IM, (Reported)





Patient History


Healthcare decision maker





Resuscitation status


Full Code


Advanced Directive on File








Physical Exam





Last 24 Hour Vital Signs








  Date Time  Temp Pulse Resp B/P (MAP) Pulse Ox O2 Delivery O2 Flow Rate FiO2


 


19 09:00      Room Air  


 


19 08:00 97.9  18 149/72 (97) 97   


 


19 04:00 98.7 70 16 132/60 (84) 98   


 


19 00:00 98.5 65 18 131/61 (84) 96   


 


19 21:00      Room Air  


 


19 20:00 99.3 71 18 149/70 (96) 98   


 


19 16:00 98.3 70 17 146/65 (92) 97   

















Intake and Output  


 


 19





 19:00 07:00


 


Intake Total 760 ml 880.0 ml


 


Balance 760 ml 880.0 ml


 


  


 


Intake Oral 480 ml 


 


IV Total 280 ml 880.0 ml


 


# Voids 3 1











Laboratory Tests








Test


  19


10:00


 


White Blood Count


  6.1 K/UL


(4.8-10.8)


 


Red Blood Count


  3.85 M/UL


(4.20-5.40)  L


 


Hemoglobin


  12.8 G/DL


(12.0-16.0)


 


Hematocrit


  38.3 %


(37.0-47.0)


 


Mean Corpuscular Volume 99 FL (80-99)  


 


Mean Corpuscular Hemoglobin


  33.2 PG


(27.0-31.0)  H


 


Mean Corpuscular Hemoglobin


Concent 33.4 G/DL


(32.0-36.0)


 


Red Cell Distribution Width


  10.8 %


(11.6-14.8)  L


 


Platelet Count


  200 K/UL


(150-450)


 


Mean Platelet Volume


  6.4 FL


(6.5-10.1)  L


 


Neutrophils (%) (Auto)


  72.4 %


(45.0-75.0)


 


Lymphocytes (%) (Auto)


  16.8 %


(20.0-45.0)  L


 


Monocytes (%) (Auto)


  7.9 %


(1.0-10.0)


 


Eosinophils (%) (Auto)


  1.1 %


(0.0-3.0)


 


Basophils (%) (Auto)


  1.7 %


(0.0-2.0)


 


Sodium Level


  141 MMOL/L


(136-145)


 


Potassium Level


  4.1 MMOL/L


(3.5-5.1)


 


Chloride Level


  106 MMOL/L


()


 


Carbon Dioxide Level


  29 MMOL/L


(21-32)


 


Anion Gap


  6 mmol/L


(5-15)


 


Blood Urea Nitrogen


  13 mg/dL


(7-18)


 


Creatinine


  1.5 MG/DL


(0.55-1.30)  H


 


Estimat Glomerular Filtration


Rate  mL/min (>60)  


 


 


Glucose Level


  121 MG/DL


()  H


 


Hemoglobin A1c


  5.3 %


(4.3-6.0)


 


Uric Acid


  5.2 MG/DL


(2.6-7.2)


 


Calcium Level


  9.3 MG/DL


(8.5-10.1)


 


Phosphorus Level


  2.6 MG/DL


(2.5-4.9)


 


Magnesium Level


  1.8 MG/DL


(1.8-2.4)


 


Total Bilirubin


  1.2 MG/DL


(0.2-1.0)  H


 


Direct Bilirubin


  0.3 MG/DL


(0.0-0.3)


 


Gamma Glutamyl Transpeptidase 24 U/L (5-85)  


 


Aspartate Amino Transf


(AST/SGOT) 19 U/L (15-37)


 


 


Alanine Aminotransferase


(ALT/SGPT) 20 U/L (12-78)


 


 


Alkaline Phosphatase


  97 U/L


()


 


Total Creatine Kinase


  93 U/L


()


 


C-Reactive Protein,


Quantitative 3.0 mg/dL


(0.00-0.90)  H


 


Total Protein


  7.2 G/DL


(6.4-8.2)


 


Albumin


  3.4 G/DL


(3.4-5.0)


 


Globulin 3.8 g/dL  


 


Albumin/Globulin Ratio


  0.9 (1.0-2.7)


L


 


Triglycerides Level


  93 MG/DL


()


 


Cholesterol Level


  128 MG/DL (<


200)


 


LDL Cholesterol


  69 mg/dL


(<100)


 


HDL Cholesterol


  42 MG/DL


(40-60)


 


Cholesterol/HDL Ratio


  3.0 (3.3-4.4)


L


 


Thyroid Stimulating Hormone


(TSH) 1.254 uiU/mL


(0.358-3.740)








Height (Feet):  5


Height (Inches):  4.00


Weight (Pounds):  185


Medications





Current Medications








 Medications


  (Trade)  Dose


 Ordered  Sig/George


 Route


 PRN Reason  Start Time


 Stop Time Status Last Admin


Dose Admin


 


 Acetaminophen


  (Tylenol)  650 mg  Q4H  PRN


 ORAL


 Mild Pain/Temp > 100.5  19 23:30


 19 23:29  19 03:10


 


 


 Chlorhexidine


 Gluconate


  (Rut-Hex 2%)  1 applic  DAILY@2000


 TOPIC


   19 20:00


 19 19:59  19 21:14


 


 


 Iopamidol


  (Isovue-300


 100ml)  100 ml  NOW  PRN


 INJ


 Radiology Procedure  19 20:30


     


 


 


 Morphine Sulfate


  (Morphine


 Sulfate)  4 mg  Q4H  PRN


 IM


 Severe Pain (Pain Scale 7-10)  19 20:15


 19 20:14  19 12:13


 


 


 Ondansetron HCl


  (Zofran)  4 mg  Q6H  PRN


 IVP


 Nausea & Vomiting  19 00:45


 19 00:44  19 21:14


 


 


 Pantoprazole


  (Protonix)  40 mg  EVERY 12  HOURS


 ORAL


   19 21:00


 19 20:59   


 


 


 Piperacillin Sod/


 Tazobactam Sod


 3.375 gm/Sodium


 Chloride  110 ml @ 


 27.5 mls/hr  Q8HR


 IVPB


   19 11:30


 19 11:29  19 06:25


 


 


 Sodium Chloride  1,000 ml @ 


 50 mls/hr  Q20H


 IV


   19 13:00


 19 12:59   


 











Assessment/Plan


Assessment/Plan:


HEMATOLOGY/ONCOLOGY PROGRESS NOTE 





DATE OF CONSULT: 2019 


REFERRING PHYSICIAN: Jose Alberto Garcia


REASON FOR CONSULT: Renal mass 





HISTORY OF PRESENT ILLNESS:  The patient was admitted for gastrointestinal 

bleed. The patient has been complaining of vomiting blood as well as rectal 

bleed. The patient also has been having abdominal cramps that has been going on 

for a week. The patient was admitted for diverticulitis flare that she has. The 

patient denies chills. Denies cough. Denies shortness of breath. Denies 

orthopnea. Hematology/Oncology services have been consulted for the evaluation 

of a renal mass that was found on an abd CT. 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 

clear cell papillary renal cell carcinoma. 





PAST MEDICAL HISTORY:  Significant for chest pain, history of diverticulitis, 

history of renal cancer, constipation, hypertension, chronic pain syndrome, 

hyperlipidemia, hypertension, and dyslipidemia.





PAST SURGICAL HISTORY:  Ovarian cyst removed, partial right nephrectomy for 

cancer.





ALLERGIES:  Tramadol, codeine.





SOCIAL HISTORY:  Denies history of smoking, alcohol, or illicit drugs.





REVIEW OF SYSTEMS:  


HEENT:  Denies headaches.    


RESPIRATORY:  Denies shortness of breath.  Denies cough  


CARDIOVASCULAR:  Denies chest pain.


GASTROINTESTINAL:  Reports vomiting blood and rectal bleed as well as abdominal 

cramps for about one week.   


EXTREMITIES:  Denies pain in the lower extremity.   


CNS:  No change in vision or speech pattern.





PHYSICAL EXAMINATION:


VITAL SIGNS:  Reviewed. 


HEENT:  PERRLA.


NECK:  Supple.  No lymphadenopathy.


CHEST:  Clear to auscultation.


CARDIOVASCULAR:  Regular rate and rhythm.  No murmurs or extra sounds.


GASTROINTESTINAL:  Diffuse abdominal pain.  Abdomen is soft.  No rebound. No 

organomegaly.


EXTREMITIES:  A 1+ edema.  Reflexes equal in both sides.  Moving all four 

extremities. 





MEDICATIONS:  Lipitor, Colace, Toviaz, Cymbalta, Colace, diltiazem, Crestor, 

hydrochlorothiazide.





IMAGIN/30: CT abd --> Possible early uncomplicated acute diverticulitis of the 

sigmoid. Extensive colonic diverticulosis elsewhere. Note apparent absence of 

previously demonstrated right renal contrast enhancing mass.This could indicate 

in visibility due to the lack of IV contrast, or could indicate prior therapy. 

Correlate with clinical/surgical history 4 mm rectangular density in the right 

middle lobe, visible on earlier  study and unchanged, presumed benign.





LABORATORY STUDIES:  


: wbc 6.1 hgb 12.8 plt 200k 





ASSESSMENT AND PLAN 





# Renal cell carcinoma, status post nephrectomy that was completed 

approximately 1 month ago, pathology was reviewed and is pT1 staging does not 

require further treatment, lymph nodes not submitted, but no evidence of 

distant metastasis, also no evidence of positive margins noted on the tumor 

report


--> CT abd: 4 mm rectangular density in the right middle lobe, visible on 

earlier  study and unchanged, presumed benign


--> trend cr as per renal, baseline, cr is 1.4-1.5


--> monitor with serial imaging as needed


# Anemia of chronic disease, cr is 11-12 range


--> closely monitor currently is stable


--> hold off any further workup at this time


# Sigmoid diverticulitis. ID and GI are following, appreciate recs. 


--> Abx per ID. 


--> c. diff testing as needed


--> PPI


# Hypertension. 


--> sbp goal <140


# COPD. 


# Chronic kidney disease. cr 1.4-1.5








The time note is entered does not reflect time of encounter.





GREATLY APPRECIATE CONSULTATION











Thomas Davis MD May 31, 2019 12:53

## 2019-05-31 NOTE — CONSULTATION
Consult Note


Consult Note





asked to eval for elevated Cr and electrolyte imbalance


interviewed


examined


data reviewed





.


Assessment/Plan





Cr 1.5 unchanged- CKD


previous right kidney cancer - tumor surgically removed per patient


extensive diverticulosis


Obese


Sz disorder


Asthma / COPD


HTN





not much to add from renal stand point


Per GI


Protonix added























Julián Ely MD May 31, 2019 11:38

## 2019-05-31 NOTE — NUR
nurse's notes: received patient awake, alert and oriented; complaining of right lower 
abdomen and right flank aching pain; also s/o of nausea but no vomiting.  noted double lumen 
PICC line on the left upper arm; dressing is clean, dry and intact; unable to flush the port 
that is not being used currently.  Dynahex bath given, patient appreciative of care.  
otherwise, no other c/o received. plan of care discussed with patient and is agreeable.

## 2019-05-31 NOTE — INFECTIOUS DISEASES PROG NOTE
Assessment/Plan


Assessment/Plan


IMPRESSION:  


Sigmoid diverticulitis.  


Hypertension, 


COPD,


asthma, 


chronic kidney disease, 


renal cell cancer, 


status post nephrectomy.





RECOMMENDATION:  


Continue IV Zosyn





Subjective


ROS Limited/Unobtainable:  No


Constitutional:  Reports: other - feels sick


Respiratory:  Reports: no symptoms


Cardiovascular:  Reports: no symptoms


Gastrointestinal/Abdominal:  Reports: nausea, vomiting, other - abdominal pain


Genitourinary:  Reports: no symptoms


Allergies:  


Coded Allergies:  


     TRAMADOL (Verified  Allergy, Intermediate, 4/4/18)


 TREMORS AND VOMITING


     CODEINE (Verified  Adverse Reaction, Mild, 1/25/13)


 nausea





Objective


Vital Signs





Last 24 Hour Vital Signs








  Date Time  Temp Pulse Resp B/P (MAP) Pulse Ox O2 Delivery O2 Flow Rate FiO2


 


5/31/19 09:00      Room Air  


 


5/31/19 08:00 97.9  18 149/72 (97) 97   


 


5/31/19 04:00 98.7 70 16 132/60 (84) 98   


 


5/31/19 00:00 98.5 65 18 131/61 (84) 96   


 


5/30/19 21:00      Room Air  


 


5/30/19 20:00 99.3 71 18 149/70 (96) 98   


 


5/30/19 16:00 98.3 70 17 146/65 (92) 97   








Height (Feet):  5


Height (Inches):  4.00


Weight (Pounds):  185


General Appearance:  no acute distress


HEENT:  mucous membranes moist


Respiratory/Chest:  lungs clear


Cardiovascular:  normal rate


Abdomen:  other - soft, tender


Extremities:  no edema


Neurologic/Psychiatric:  alert, oriented x 3, responsive





Laboratory Tests








Test


  5/31/19


10:00


 


White Blood Count


  6.1 K/UL


(4.8-10.8)


 


Red Blood Count


  3.85 M/UL


(4.20-5.40)  L


 


Hemoglobin


  12.8 G/DL


(12.0-16.0)


 


Hematocrit


  38.3 %


(37.0-47.0)


 


Mean Corpuscular Volume 99 FL (80-99)  


 


Mean Corpuscular Hemoglobin


  33.2 PG


(27.0-31.0)  H


 


Mean Corpuscular Hemoglobin


Concent 33.4 G/DL


(32.0-36.0)


 


Red Cell Distribution Width


  10.8 %


(11.6-14.8)  L


 


Platelet Count


  200 K/UL


(150-450)


 


Mean Platelet Volume


  6.4 FL


(6.5-10.1)  L


 


Neutrophils (%) (Auto)


  72.4 %


(45.0-75.0)


 


Lymphocytes (%) (Auto)


  16.8 %


(20.0-45.0)  L


 


Monocytes (%) (Auto)


  7.9 %


(1.0-10.0)


 


Eosinophils (%) (Auto)


  1.1 %


(0.0-3.0)


 


Basophils (%) (Auto)


  1.7 %


(0.0-2.0)


 


Sodium Level


  141 MMOL/L


(136-145)


 


Potassium Level


  4.1 MMOL/L


(3.5-5.1)


 


Chloride Level


  106 MMOL/L


()


 


Carbon Dioxide Level


  29 MMOL/L


(21-32)


 


Anion Gap


  6 mmol/L


(5-15)


 


Blood Urea Nitrogen


  13 mg/dL


(7-18)


 


Creatinine


  1.5 MG/DL


(0.55-1.30)  H


 


Estimat Glomerular Filtration


Rate  mL/min (>60)  


 


 


Glucose Level


  121 MG/DL


()  H


 


Hemoglobin A1c


  5.3 %


(4.3-6.0)


 


Uric Acid


  5.2 MG/DL


(2.6-7.2)


 


Calcium Level


  9.3 MG/DL


(8.5-10.1)


 


Phosphorus Level


  2.6 MG/DL


(2.5-4.9)


 


Magnesium Level


  1.8 MG/DL


(1.8-2.4)


 


Total Bilirubin


  1.2 MG/DL


(0.2-1.0)  H


 


Direct Bilirubin


  0.3 MG/DL


(0.0-0.3)


 


Gamma Glutamyl Transpeptidase 24 U/L (5-85)  


 


Aspartate Amino Transf


(AST/SGOT) 19 U/L (15-37)


 


 


Alanine Aminotransferase


(ALT/SGPT) 20 U/L (12-78)


 


 


Alkaline Phosphatase


  97 U/L


()


 


Total Creatine Kinase


  93 U/L


()


 


C-Reactive Protein,


Quantitative 3.0 mg/dL


(0.00-0.90)  H


 


Total Protein


  7.2 G/DL


(6.4-8.2)


 


Albumin


  3.4 G/DL


(3.4-5.0)


 


Globulin 3.8 g/dL  


 


Albumin/Globulin Ratio


  0.9 (1.0-2.7)


L


 


Triglycerides Level


  93 MG/DL


()


 


Cholesterol Level


  128 MG/DL (<


200)


 


LDL Cholesterol


  69 mg/dL


(<100)


 


HDL Cholesterol


  42 MG/DL


(40-60)


 


Cholesterol/HDL Ratio


  3.0 (3.3-4.4)


L


 


Thyroid Stimulating Hormone


(TSH) 1.254 uiU/mL


(0.358-3.740)











Current Medications








 Medications


  (Trade)  Dose


 Ordered  Sig/George


 Route


 PRN Reason  Start Time


 Stop Time Status Last Admin


Dose Admin


 


 Acetaminophen


  (Tylenol)  650 mg  Q4H  PRN


 ORAL


 Mild Pain/Temp > 100.5  5/28/19 23:30


 6/27/19 23:29  5/29/19 03:10


 


 


 Chlorhexidine


 Gluconate


  (Rut-Hex 2%)  1 applic  DAILY@2000


 TOPIC


   5/29/19 20:00


 6/28/19 19:59  5/30/19 21:14


 


 


 Iopamidol


  (Isovue-300


 100ml)  100 ml  NOW  PRN


 INJ


 Radiology Procedure  5/28/19 20:30


     


 


 


 Morphine Sulfate


  (Morphine


 Sulfate)  4 mg  Q4H  PRN


 IM


 Severe Pain (Pain Scale 7-10)  5/29/19 20:15


 6/5/19 20:14  5/31/19 12:13


 


 


 Ondansetron HCl


  (Zofran)  4 mg  Q6H  PRN


 IVP


 Nausea & Vomiting  5/29/19 00:45


 6/28/19 00:44  5/30/19 21:14


 


 


 Pantoprazole


  (Protonix)  40 mg  EVERY 12  HOURS


 ORAL


   5/31/19 21:00


 6/30/19 20:59   


 


 


 Piperacillin Sod/


 Tazobactam Sod


 3.375 gm/Sodium


 Chloride  110 ml @ 


 27.5 mls/hr  Q8HR


 IVPB


   5/30/19 11:30


 6/6/19 11:29  5/31/19 06:25


 


 


 Sodium Chloride  1,000 ml @ 


 50 mls/hr  Q20H


 IV


   5/31/19 13:00


 6/27/19 12:59   


 

















Leonardo Jacob MD May 31, 2019 13:17

## 2019-05-31 NOTE — GENERAL PROGRESS NOTE
Assessment/Plan


Assessment/Plan:


(1) Abdominal pain


(2) Diverticulitis 


(3) Lumbar Radiculopathy


(4) Lumbar DDD


Patient to be continued on Morphine as needed


D/w Dr. Saenz and he concurred.





Subjective


Date patient seen:  May 31, 2019


Time patient seen:  07:45 - am


Constitutional:  Reports: no symptoms


HEENT:  Reports: no symptoms


Cardiovascular:  Reports: no symptoms


Respiratory:  Reports: no symptoms


Gastrointestinal/Abdominal:  Reports: no symptoms


Genitourinary:  Reports: no symptoms


Neurologic/Psychiatric:  Reports: no symptoms


Endocrine:  Reports: no symptoms


Hematologic/Lymphatic:  Reports: no symptoms


Allergies:  


Coded Allergies:  


     TRAMADOL (Verified  Allergy, Intermediate, 4/4/18)


 TREMORS AND VOMITING


     CODEINE (Verified  Adverse Reaction, Mild, 1/25/13)


 nausea


Subjective


Patient is in bed continues to c/o pain. The pain has been tolerated on the 

Morphine 4 doses in the last 24hrs. 


She has no new complaints at this time.





Objective





Last 24 Hour Vital Signs








  Date Time  Temp Pulse Resp B/P (MAP) Pulse Ox O2 Delivery O2 Flow Rate FiO2


 


5/31/19 04:00 98.7 70 16 132/60 (84) 98   


 


5/31/19 00:00 98.5 65 18 131/61 (84) 96   


 


5/30/19 21:00      Room Air  


 


5/30/19 20:00 99.3 71 18 149/70 (96) 98   


 


5/30/19 16:00 98.3 70 17 146/65 (92) 97   


 


5/30/19 12:00 98.6 69 18 124/55 (78) 100   


 


5/30/19 09:00      Room Air  

















Intake and Output  


 


 5/30/19 5/31/19





 19:00 07:00


 


Intake Total 760 ml 880.0 ml


 


Balance 760 ml 880.0 ml


 


  


 


Intake Oral 480 ml 


 


IV Total 280 ml 880.0 ml


 


# Voids 3 1








Height (Feet):  5


Height (Inches):  4.00


Weight (Pounds):  185


General Appearance:  no apparent distress, alert


EENT:  PERRL/EOMI, normal ENT inspection


Neck:  non-tender, normal alignment


Cardiovascular:  normal rate, regular rhythm


Respiratory/Chest:  lungs clear, normal breath sounds


Abdomen:  soft, tender


Extremities:  non-tender


Edema:  trace edema


Neurologic:  alert, oriented x 3


Skin:  normal pigmentation











Zedner,Spencer N. PA May 31, 2019 08:57

## 2019-05-31 NOTE — GENERAL PROGRESS NOTE
Assessment/Plan


Problem List:  


(1) Abdominal pain


ICD Codes:  R10.9 - Unspecified abdominal pain


SNOMED:  94110630


(2) Colitis


ICD Codes:  K52.9 - Noninfective gastroenteritis and colitis, unspecified


SNOMED:  356993420


(3) Abdominal pain


ICD Codes:  R10.9 - Unspecified abdominal pain


SNOMED:  15246407, 592313814


(4) S/p nephrectomy


ICD Codes:  Z90.5 - Acquired absence of kidney


SNOMED:  99604388, 826188283


(5) Diverticulitis


ICD Codes:  K57.92 - Diverticulitis of intestine, part unspecified, without 

perforation or abscess without bleeding


SNOMED:  845227945


(6) Intractable pain


ICD Codes:  R52 - Pain, unspecified


SNOMED:  62820921


Status:  progressing


Assessment/Plan:


diverticulitis


still abdominal pain


afebrile


no gi bleed


intractable pain


pain management per dr boateng


needs iv abx





Subjective


ROS Limited/Unobtainable:  Yes


Allergies:  


Coded Allergies:  


     TRAMADOL (Verified  Allergy, Intermediate, 4/4/18)


 TREMORS AND VOMITING


     CODEINE (Verified  Adverse Reaction, Mild, 1/25/13)


 nausea





Objective





Last 24 Hour Vital Signs








  Date Time  Temp Pulse Resp B/P (MAP) Pulse Ox O2 Delivery O2 Flow Rate FiO2


 


5/31/19 16:00 99.8 76 20 145/74 (97) 97   


 


5/31/19 12:43 97.9       


 


5/31/19 12:00 98.8  21 167/85 (112) 99   


 


5/31/19 12:00 98.8 72 21 167/85 (112) 99   


 


5/31/19 09:00      Room Air  


 


5/31/19 08:00 97.9  18 149/72 (97) 97   


 


5/31/19 04:00 98.7 70 16 132/60 (84) 98   


 


5/31/19 00:00 98.5 65 18 131/61 (84) 96   

















Intake and Output  


 


 5/30/19 5/31/19





 19:00 07:00


 


Intake Total 760 ml 880.0 ml


 


Balance 760 ml 880.0 ml


 


  


 


Intake Oral 480 ml 


 


IV Total 280 ml 880.0 ml


 


# Voids 3 1








Laboratory Tests


5/31/19 10:00: 


White Blood Count 6.1, Red Blood Count 3.85L, Hemoglobin 12.8, Hematocrit 38.3, 

Mean Corpuscular Volume 99, Mean Corpuscular Hemoglobin 33.2H, Mean Corpuscular 

Hemoglobin Concent 33.4, Red Cell Distribution Width 10.8L, Platelet Count 200, 

Mean Platelet Volume 6.4L, Neutrophils (%) (Auto) 72.4, Lymphocytes (%) (Auto) 

16.8L, Monocytes (%) (Auto) 7.9, Eosinophils (%) (Auto) 1.1, Basophils (%) (Auto

) 1.7, Sodium Level 141, Potassium Level 4.1, Chloride Level 106, Carbon 

Dioxide Level 29, Anion Gap 6, Blood Urea Nitrogen 13, Creatinine 1.5H, Estimat 

Glomerular Filtration Rate , Glucose Level 121H, Hemoglobin A1c 5.3, Uric Acid 

5.2, Calcium Level 9.3, Phosphorus Level 2.6, Magnesium Level 1.8, Total 

Bilirubin 1.2H, Direct Bilirubin 0.3, Gamma Glutamyl Transpeptidase 24, 

Aspartate Amino Transf (AST/SGOT) 19, Alanine Aminotransferase (ALT/SGPT) 20, 

Alkaline Phosphatase 97, Total Creatine Kinase 93, C-Reactive Protein, 

Quantitative 3.0H, Total Protein 7.2, Albumin 3.4, Globulin 3.8, Albumin/

Globulin Ratio 0.9L, Triglycerides Level 93, Cholesterol Level 128, LDL 

Cholesterol 69, HDL Cholesterol 42, Cholesterol/HDL Ratio 3.0L, Thyroid 

Stimulating Hormone (TSH) 1.254


Height (Feet):  5


Height (Inches):  4.00


Weight (Pounds):  185


Neck:  supple


Cardiovascular:  normal rate


Respiratory/Chest:  lungs clear


Abdomen:  soft











Jose Alberto Garcia MD May 31, 2019 21:11

## 2019-06-01 NOTE — NUR
nurse's notes: received patient awake, alert and oriented; no s/s of distress at this time 
but admits to pain despite recent administration of morphine.  PICC line on the left upper 
arm noted; dressing is CDI; no s/s of infection but unable to flush red port.  call light 
within easy reach; bed at lowest position.  will continue to monitor and follow plan of 
care.

## 2019-06-01 NOTE — NUR
NURSE NOTES:



Received report from SIMON Guzmán. Rounding done with outgoing nurse. Patient a/o x 4 lying on 
the bed. No GI bleeding noted. Denies pain at this time. IV fluid is running now. Bed in 
lowest position, call light within reach. Will continue to monitor.

## 2019-06-01 NOTE — NUR
NURSE NOTES:



Patient wants to resume home meds. Called Dr. Garcia and left the message. Waiting MD call 
back.

## 2019-06-01 NOTE — NUR
NURSE NOTES:



Es charge nurse also called Dr. Garcia regarding home meds. Waiting for MD call back.

## 2019-06-01 NOTE — NEPHROLOGY PROGRESS NOTE
Assessment/Plan


Problem List:  


(1) S/p nephrectomy


(2) Hypokalemia


(3) CKD (chronic kidney disease)


Assessment





Cr 1.5 unchanged- CKD


previous right kidney cancer - tumor surgically removed per patient


extensive diverticulosis


Obese


Sz disorder


Asthma / COPD


HTN


Plan





not much to add from renal stand point


Per GI


Protonix added


check labs in am





Subjective


ROS Limited/Unobtainable:  No





Objective


Objective





Last 24 Hour Vital Signs








  Date Time  Temp Pulse Resp B/P (MAP) Pulse Ox O2 Delivery O2 Flow Rate FiO2


 


6/1/19 12:00 99.3 71 18 141/69 (93) 96   


 


6/1/19 09:00      Room Air  


 


6/1/19 08:00 99.3 81 18 144/87 (106) 96   


 


6/1/19 00:00 98.3 65 18 130/62 (84) 99   


 


5/31/19 21:00      Room Air  


 


5/31/19 20:00 99.3 86 18 171/90 (117) 96   


 


5/31/19 16:00 99.8 76 20 145/74 (97) 97   

















Intake and Output  


 


 5/31/19 6/1/19





 19:00 07:00


 


Intake Total 527.5 ml 


 


Balance 527.5 ml 


 


  


 


Intake Oral 500 ml 


 


IV Total 27.5 ml 


 


# Voids 2 








Height (Feet):  5


Height (Inches):  4.00


Weight (Pounds):  185


General Appearance:  no apparent distress


Cardiovascular:  normal rate


Respiratory/Chest:  decreased breath sounds


Abdomen:  other - obese











Julián Ely MD Jun 1, 2019 14:34

## 2019-06-01 NOTE — GENERAL PROGRESS NOTE
Assessment/Plan


Status:  progressing


Assessment/Plan:


Assessment/Plan:


Assessment


- N/V - improved, tolerating po


- abd pain - improved


- diarrhea


- h/o diverticulosis





Recommendations


- Advance diet


- abx


- check stool Cx, C Diff (await BM)


- OOB





Subjective


ROS Limited/Unobtainable:  Yes


Allergies:  


Coded Allergies:  


     TRAMADOL (Verified  Allergy, Intermediate, 4/4/18)


 TREMORS AND VOMITING


     CODEINE (Verified  Adverse Reaction, Mild, 1/25/13)


 nausea





Objective





Last 24 Hour Vital Signs








  Date Time  Temp Pulse Resp B/P (MAP) Pulse Ox O2 Delivery O2 Flow Rate FiO2


 


6/1/19 00:00 98.3 65 18 130/62 (84) 99   


 


5/31/19 21:00      Room Air  


 


5/31/19 20:00 99.3 86 18 171/90 (117) 96   


 


5/31/19 16:00 99.8 76 20 145/74 (97) 97   


 


5/31/19 12:43 97.9       


 


5/31/19 12:00 98.8  21 167/85 (112) 99   


 


5/31/19 12:00 98.8 72 21 167/85 (112) 99   


 


5/31/19 09:00      Room Air  


 


5/31/19 08:00 97.9  18 149/72 (97) 97   

















Intake and Output  


 


 5/31/19 6/1/19





 18:59 06:59


 


Intake Total 527.5 ml 


 


Balance 527.5 ml 


 


  


 


Intake Oral 500 ml 


 


IV Total 27.5 ml 


 


# Voids 2 








Laboratory Tests


5/31/19 10:00: 


White Blood Count 6.1, Red Blood Count 3.85L, Hemoglobin 12.8, Hematocrit 38.3, 

Mean Corpuscular Volume 99, Mean Corpuscular Hemoglobin 33.2H, Mean Corpuscular 

Hemoglobin Concent 33.4, Red Cell Distribution Width 10.8L, Platelet Count 200, 

Mean Platelet Volume 6.4L, Neutrophils (%) (Auto) 72.4, Lymphocytes (%) (Auto) 

16.8L, Monocytes (%) (Auto) 7.9, Eosinophils (%) (Auto) 1.1, Basophils (%) (Auto

) 1.7, Sodium Level 141, Potassium Level 4.1, Chloride Level 106, Carbon 

Dioxide Level 29, Anion Gap 6, Blood Urea Nitrogen 13, Creatinine 1.5H, Estimat 

Glomerular Filtration Rate , Glucose Level 121H, Hemoglobin A1c 5.3, Uric Acid 

5.2, Calcium Level 9.3, Phosphorus Level 2.6, Magnesium Level 1.8, Total 

Bilirubin 1.2H, Direct Bilirubin 0.3, Gamma Glutamyl Transpeptidase 24, 

Aspartate Amino Transf (AST/SGOT) 19, Alanine Aminotransferase (ALT/SGPT) 20, 

Alkaline Phosphatase 97, Total Creatine Kinase 93, C-Reactive Protein, 

Quantitative 3.0H, Total Protein 7.2, Albumin 3.4, Globulin 3.8, Albumin/

Globulin Ratio 0.9L, Triglycerides Level 93, Cholesterol Level 128, LDL 

Cholesterol 69, HDL Cholesterol 42, Cholesterol/HDL Ratio 3.0L, Thyroid 

Stimulating Hormone (TSH) 1.254


Height (Feet):  5


Height (Inches):  4.00


Weight (Pounds):  185


General Appearance:  alert


EENT:  PERRL/EOMI


Neck:  supple


Cardiovascular:  normal rate


Respiratory/Chest:  lungs clear


Abdomen:  normal bowel sounds, non tender, soft


Extremities:  non-tender











Daniel Mcclendon MD Jun 1, 2019 07:06

## 2019-06-02 NOTE — HEMATOLOGY/ONC PROGRESS NOTE
Assessment/Plan


Assessment/Plan


ASSESSMENT AND PLAN 


# Renal cell carcinoma, status post nephrectomy that was completed 

approximately 1 month ago, pathology was reviewed and is pT1 staging does not 

require further treatment, lymph nodes not submitted, but no evidence of 

distant metastasis, also no evidence of positive margins noted on the tumor 

report


--> CT abd: 4 mm rectangular density in the right middle lobe, visible on 

earlier 2015 study and unchanged, presumed benign


--> trend cr as per renal, baseline, cr is 1.4-1.5


--> cr trend: 1.4


--> monitor with serial imaging as needed


# Leukopenia is likely a reactive process v infection


--> smear peripherla has been reviewed


--> hepatitis and hiv in the past neg


--> no cirrhosis or hsm on us


--> thyroid studies are wnl


# Anemia of chronic disease


--> closely monitor 


--> currently is stable at 10.8


--> hold off any further workup at this time


# Sigmoid diverticulitis. ID and GI are following, appreciate recs. 


--> Abx per ID. 


--> c. diff testing as needed


--> PPI


# Hypertension. 


--> sbp goal <140


# COPD. 


# Chronic kidney disease. cr 1.4-1.5








The time note is entered does not reflect time of encounter.





GREATLY APPRECIATE CONSULTATION





Subjective


Allergies:  


Coded Allergies:  


     TRAMADOL (Verified  Allergy, Intermediate, 4/4/18)


 TREMORS AND VOMITING


     CODEINE (Verified  Adverse Reaction, Mild, 1/25/13)


 nausea


All Systems:  reviewed and negative except above


Subjective


6/02: Pt alert and awake. No acute events.





Objective


Objective





Current Medications








 Medications


  (Trade)  Dose


 Ordered  Sig/George


 Route


 PRN Reason  Start Time


 Stop Time Status Last Admin


Dose Admin


 


 Acetaminophen


  (Tylenol)  650 mg  Q4H  PRN


 ORAL


 Mild Pain/Temp > 100.5  5/28/19 23:30


 6/27/19 23:29  6/2/19 09:07


 


 


 Atorvastatin


 Calcium


  (Lipitor)  10 mg  BEDTIME


 ORAL


   6/2/19 21:00


 7/2/19 20:59   


 


 


 Chlorhexidine


 Gluconate


  (Rut-Hex 2%)  1 applic  DAILY@2000


 TOPIC


   5/29/19 20:00


 6/28/19 19:59  6/1/19 20:11


 


 


 Hydralazine HCl


  (Apresoline)  25 mg  BID


 ORAL


   6/2/19 09:00


 7/2/19 08:59  6/2/19 09:08


 


 


 Hydrochlorothiazide


  (Hydrodiuril)  12.5 mg  DAILY


 ORAL


   6/2/19 09:00


 7/2/19 08:59  6/2/19 09:08


 


 


 Iopamidol


  (Isovue-300


 100ml)  100 ml  NOW  PRN


 INJ


 Radiology Procedure  5/28/19 20:30


     


 


 


 Morphine Sulfate


  (Morphine


 Sulfate)  4 mg  Q4H  PRN


 IVP


 Severe Pain (Pain Scale 7-10)  6/2/19 12:48


 6/9/19 12:47  6/2/19 14:30


 


 


 Ondansetron HCl


  (Zofran)  4 mg  Q6H  PRN


 IVP


 Nausea & Vomiting  5/29/19 00:45


 6/28/19 00:44  6/2/19 09:08


 


 


 Pantoprazole


  (Protonix)  40 mg  EVERY 12  HOURS


 ORAL


   5/31/19 21:00


 6/30/19 20:59  6/2/19 09:08


 


 


 Piperacillin Sod/


 Tazobactam Sod


 3.375 gm/Sodium


 Chloride  110 ml @ 


 27.5 mls/hr  Q8HR


 IVPB


   5/30/19 11:30


 6/6/19 11:29  6/2/19 14:29


 


 


 Sodium Chloride  1,000 ml @ 


 50 mls/hr  Q20H


 IV


   5/31/19 13:00


 6/27/19 12:59  6/2/19 05:17


 











Last 24 Hour Vital Signs








  Date Time  Temp Pulse Resp B/P (MAP) Pulse Ox O2 Delivery O2 Flow Rate FiO2


 


6/2/19 15:00 98.4       


 


6/2/19 12:00 98.4 62 20 141/76 (97) 95   


 


6/2/19 09:37 98.4       


 


6/2/19 09:08    163/75    


 


6/2/19 09:00      Room Air  


 


6/2/19 08:00 98.4 65 20 163/75 (104) 96   


 


6/2/19 04:00 98.2 63 16 136/54 (81) 98   


 


6/2/19 00:00 98.3 67 18 122/63 (82) 96   


 


6/1/19 21:00      Room Air  


 


6/1/19 20:00 99.3 65 16 152/70 (97) 99   


 


6/1/19 16:00 99.5 65 18 120/69 (86) 96   


 


6/1/19 12:00 99.3 71 18 141/69 (93) 96   


 


6/1/19 09:00      Room Air  


 


6/1/19 08:00 99.3 81 18 144/87 (106) 96   


 


6/1/19 00:00 98.3 65 18 130/62 (84) 99   


 


5/31/19 21:00      Room Air  


 


5/31/19 20:00 99.3 86 18 171/90 (117) 96   


 


5/31/19 16:00 99.8 76 20 145/74 (97) 97   

















Intake and Output  


 


 6/1/19 6/2/19





 19:00 07:00


 


Intake Total 830.0 ml 861.95 ml


 


Output Total  550 ml


 


Balance 830.0 ml 311.95 ml


 


  


 


Intake Oral 720 ml 240 ml


 


IV Total 110.0 ml 621.95 ml


 


Output Urine Total  550 ml


 


# Voids 4 3











Labs








Test


  5/31/19


10:00 6/2/19


05:00


 


White Blood Count


  6.1 K/UL


(4.8-10.8) 4.6 K/UL


(4.8-10.8)


 


Red Blood Count


  3.85 M/UL


(4.20-5.40) 3.23 M/UL


(4.20-5.40)


 


Hemoglobin


  12.8 G/DL


(12.0-16.0) 10.8 G/DL


(12.0-16.0)


 


Hematocrit


  38.3 %


(37.0-47.0) 32.0 %


(37.0-47.0)


 


Mean Corpuscular Volume 99 FL (80-99)  99 FL (80-99) 


 


Mean Corpuscular Hemoglobin


  33.2 PG


(27.0-31.0) 33.3 PG


(27.0-31.0)


 


Mean Corpuscular Hemoglobin


Concent 33.4 G/DL


(32.0-36.0) 33.7 G/DL


(32.0-36.0)


 


Red Cell Distribution Width


  10.8 %


(11.6-14.8) 10.7 %


(11.6-14.8)


 


Platelet Count


  200 K/UL


(150-450) 191 K/UL


(150-450)


 


Mean Platelet Volume


  6.4 FL


(6.5-10.1) 6.6 FL


(6.5-10.1)


 


Neutrophils (%) (Auto)


  72.4 %


(45.0-75.0) 56.8 %


(45.0-75.0)


 


Lymphocytes (%) (Auto)


  16.8 %


(20.0-45.0) 28.3 %


(20.0-45.0)


 


Monocytes (%) (Auto)


  7.9 %


(1.0-10.0) 11.2 %


(1.0-10.0)


 


Eosinophils (%) (Auto)


  1.1 %


(0.0-3.0) 2.5 %


(0.0-3.0)


 


Basophils (%) (Auto)


  1.7 %


(0.0-2.0) 1.2 %


(0.0-2.0)


 


Sodium Level


  141 MMOL/L


(136-145) 142 MMOL/L


(136-145)


 


Potassium Level


  4.1 MMOL/L


(3.5-5.1) 3.6 MMOL/L


(3.5-5.1)


 


Chloride Level


  106 MMOL/L


() 107 MMOL/L


()


 


Carbon Dioxide Level


  29 MMOL/L


(21-32) 29 MMOL/L


(21-32)


 


Anion Gap


  6 mmol/L


(5-15) 6 mmol/L


(5-15)


 


Blood Urea Nitrogen


  13 mg/dL


(7-18) 8 mg/dL (7-18) 


 


 


Creatinine


  1.5 MG/DL


(0.55-1.30) 1.4 MG/DL


(0.55-1.30)


 


Estimat Glomerular Filtration


Rate  mL/min (>60) 


   mL/min (>60) 


 


 


Glucose Level


  121 MG/DL


() 97 MG/DL


()


 


Hemoglobin A1c


  5.3 %


(4.3-6.0) 


 


 


Uric Acid


  5.2 MG/DL


(2.6-7.2) 2.7 MG/DL


(2.6-7.2)


 


Calcium Level


  9.3 MG/DL


(8.5-10.1) 8.9 MG/DL


(8.5-10.1)


 


Phosphorus Level


  2.6 MG/DL


(2.5-4.9) 3.2 MG/DL


(2.5-4.9)


 


Magnesium Level


  1.8 MG/DL


(1.8-2.4) 1.8 MG/DL


(1.8-2.4)


 


Total Bilirubin


  1.2 MG/DL


(0.2-1.0) 1.7 MG/DL


(0.2-1.0)


 


Direct Bilirubin


  0.3 MG/DL


(0.0-0.3) 0.4 MG/DL


(0.0-0.3)


 


Gamma Glutamyl Transpeptidase 24 U/L (5-85)  


 


Aspartate Amino Transf


(AST/SGOT) 19 U/L (15-37) 


  18 U/L (15-37) 


 


 


Alanine Aminotransferase


(ALT/SGPT) 20 U/L (12-78) 


  16 U/L (12-78) 


 


 


Alkaline Phosphatase


  97 U/L


() 77 U/L


()


 


Total Creatine Kinase


  93 U/L


() 


 


 


C-Reactive Protein,


Quantitative 3.0 mg/dL


(0.00-0.90) 


 


 


Total Protein


  7.2 G/DL


(6.4-8.2) 6.1 G/DL


(6.4-8.2)


 


Albumin


  3.4 G/DL


(3.4-5.0) 2.8 G/DL


(3.4-5.0)


 


Globulin 3.8 g/dL  3.3 g/dL 


 


Albumin/Globulin Ratio 0.9 (1.0-2.7)  0.8 (1.0-2.7) 


 


Triglycerides Level


  93 MG/DL


() 


 


 


Cholesterol Level


  128 MG/DL (<


200) 


 


 


LDL Cholesterol


  69 mg/dL


(<100) 


 


 


HDL Cholesterol


  42 MG/DL


(40-60) 


 


 


Cholesterol/HDL Ratio 3.0 (3.3-4.4)  


 


Thyroid Stimulating Hormone


(TSH) 1.254 uiU/mL


(0.358-3.740) 


 








Height (Feet):  5


Height (Inches):  4.00


Weight (Pounds):  185


Objective


PHYSICAL EXAMINATION:


VITAL SIGNS:  Reviewed. 


HEENT:  PERRLA.


NECK:  Supple.  No lymphadenopathy.


CHEST:  Clear to auscultation.


CARDIOVASCULAR:  Regular rate and rhythm.  No murmurs or extra sounds.


GASTROINTESTINAL:  Diffuse abdominal pain.  Abdomen is soft.  No rebound. No 

organomegaly.


EXTREMITIES:  A 1+ edema.  Reflexes equal in both sides.  Moving all four 

extremities.











KleynbergThomas MD Jun 2, 2019 15:35

## 2019-06-02 NOTE — INFECTIOUS DISEASES PROG NOTE
Assessment/Plan


Assessment/Plan


IMPRESSION:  


Sigmoid diverticulitis.  


Hypertension, 


COPD,


asthma, 


chronic kidney disease, 


renal cell cancer, 


status post nephrectomy.





RECOMMENDATION:  


Continue IV Zosyn


At time of discharge PO Flagyl & Cipro





Subjective


ROS Limited/Unobtainable:  No


Constitutional:  Reports: no symptoms


Cardiovascular:  Reports: no symptoms


Gastrointestinal/Abdominal:  Reports: blood in stool, other - lower abdominal 

pain


Genitourinary:  Reports: no symptoms


Allergies:  


Coded Allergies:  


     TRAMADOL (Verified  Allergy, Intermediate, 4/4/18)


 TREMORS AND VOMITING


     CODEINE (Verified  Adverse Reaction, Mild, 1/25/13)


 nausea





Objective


Vital Signs





Last 24 Hour Vital Signs








  Date Time  Temp Pulse Resp B/P (MAP) Pulse Ox O2 Delivery O2 Flow Rate FiO2


 


6/2/19 09:08    163/75    


 


6/2/19 08:00 98.4 65 20 163/75 (104) 96   


 


6/2/19 04:00 98.2 63 16 136/54 (81) 98   


 


6/2/19 00:00 98.3 67 18 122/63 (82) 96   


 


6/1/19 21:00      Room Air  


 


6/1/19 20:00 99.3 65 16 152/70 (97) 99   


 


6/1/19 16:00 99.5 65 18 120/69 (86) 96   


 


6/1/19 12:00 99.3 71 18 141/69 (93) 96   








Height (Feet):  5


Height (Inches):  4.00


Weight (Pounds):  185


General Appearance:  no acute distress


HEENT:  mucous membranes moist


Respiratory/Chest:  lungs clear


Cardiovascular:  normal rate


Abdomen:  other - soft, mildly tender


Extremities:  no edema


Neurologic/Psychiatric:  alert, oriented x 3, responsive





Laboratory Tests








Test


  6/2/19


05:00


 


White Blood Count


  4.6 K/UL


(4.8-10.8)  L


 


Red Blood Count


  3.23 M/UL


(4.20-5.40)  L


 


Hemoglobin


  10.8 G/DL


(12.0-16.0)  L


 


Hematocrit


  32.0 %


(37.0-47.0)  L


 


Mean Corpuscular Volume 99 FL (80-99)  


 


Mean Corpuscular Hemoglobin


  33.3 PG


(27.0-31.0)  H


 


Mean Corpuscular Hemoglobin


Concent 33.7 G/DL


(32.0-36.0)


 


Red Cell Distribution Width


  10.7 %


(11.6-14.8)  L


 


Platelet Count


  191 K/UL


(150-450)


 


Mean Platelet Volume


  6.6 FL


(6.5-10.1)


 


Neutrophils (%) (Auto)


  56.8 %


(45.0-75.0)


 


Lymphocytes (%) (Auto)


  28.3 %


(20.0-45.0)


 


Monocytes (%) (Auto)


  11.2 %


(1.0-10.0)  H


 


Eosinophils (%) (Auto)


  2.5 %


(0.0-3.0)


 


Basophils (%) (Auto)


  1.2 %


(0.0-2.0)


 


Sodium Level


  142 MMOL/L


(136-145)


 


Potassium Level


  3.6 MMOL/L


(3.5-5.1)


 


Chloride Level


  107 MMOL/L


()


 


Carbon Dioxide Level


  29 MMOL/L


(21-32)


 


Anion Gap


  6 mmol/L


(5-15)


 


Blood Urea Nitrogen


  8 mg/dL (7-18)


 


 


Creatinine


  1.4 MG/DL


(0.55-1.30)  H


 


Estimat Glomerular Filtration


Rate  mL/min (>60)  


 


 


Glucose Level


  97 MG/DL


()


 


Uric Acid


  2.7 MG/DL


(2.6-7.2)


 


Calcium Level


  8.9 MG/DL


(8.5-10.1)


 


Phosphorus Level


  3.2 MG/DL


(2.5-4.9)


 


Magnesium Level


  1.8 MG/DL


(1.8-2.4)


 


Total Bilirubin


  1.7 MG/DL


(0.2-1.0)  H


 


Direct Bilirubin


  0.4 MG/DL


(0.0-0.3)  H


 


Aspartate Amino Transf


(AST/SGOT) 18 U/L (15-37)


 


 


Alanine Aminotransferase


(ALT/SGPT) 16 U/L (12-78)


 


 


Alkaline Phosphatase


  77 U/L


()


 


Total Protein


  6.1 G/DL


(6.4-8.2)  L


 


Albumin


  2.8 G/DL


(3.4-5.0)  L


 


Globulin 3.3 g/dL  


 


Albumin/Globulin Ratio


  0.8 (1.0-2.7)


L











Current Medications








 Medications


  (Trade)  Dose


 Ordered  Sig/George


 Route


 PRN Reason  Start Time


 Stop Time Status Last Admin


Dose Admin


 


 Acetaminophen


  (Tylenol)  650 mg  Q4H  PRN


 ORAL


 Mild Pain/Temp > 100.5  5/28/19 23:30


 6/27/19 23:29  6/2/19 09:07


 


 


 Atorvastatin


 Calcium


  (Lipitor)  10 mg  BEDTIME


 ORAL


   6/2/19 21:00


 7/2/19 20:59   


 


 


 Chlorhexidine


 Gluconate


  (Rut-Hex 2%)  1 applic  DAILY@2000


 TOPIC


   5/29/19 20:00


 6/28/19 19:59  6/1/19 20:11


 


 


 Hydralazine HCl


  (Apresoline)  25 mg  BID


 ORAL


   6/2/19 09:00


 7/2/19 08:59  6/2/19 09:08


 


 


 Hydrochlorothiazide


  (Hydrodiuril)  12.5 mg  DAILY


 ORAL


   6/2/19 09:00


 7/2/19 08:59  6/2/19 09:08


 


 


 Iopamidol


  (Isovue-300


 100ml)  100 ml  NOW  PRN


 INJ


 Radiology Procedure  5/28/19 20:30


     


 


 


 Morphine Sulfate


  (Morphine


 Sulfate)  4 mg  Q4H  PRN


 IM


 Severe Pain (Pain Scale 7-10)  5/29/19 20:15


 6/5/19 20:14  6/1/19 18:36


 


 


 Ondansetron HCl


  (Zofran)  4 mg  Q6H  PRN


 IVP


 Nausea & Vomiting  5/29/19 00:45


 6/28/19 00:44  6/2/19 09:08


 


 


 Pantoprazole


  (Protonix)  40 mg  EVERY 12  HOURS


 ORAL


   5/31/19 21:00


 6/30/19 20:59  6/2/19 09:08


 


 


 Piperacillin Sod/


 Tazobactam Sod


 3.375 gm/Sodium


 Chloride  110 ml @ 


 27.5 mls/hr  Q8HR


 IVPB


   5/30/19 11:30


 6/6/19 11:29  6/2/19 05:18


 


 


 Sodium Chloride  1,000 ml @ 


 50 mls/hr  Q20H


 IV


   5/31/19 13:00


 6/27/19 12:59  6/2/19 05:17


 

















Leonardo Jacob MD Jun 2, 2019 10:36

## 2019-06-02 NOTE — NUR
NURSE NOTES:

received lying on bed, with c/o lower abdominal pain, given pain mediation as ordered. No 
respiratory distress noted. Bed locked at the lowest position possible, call light within 
easy reach, siderails up x2. Will continue to monitor patient and follow up with the plan of 
care.

## 2019-06-02 NOTE — GENERAL PROGRESS NOTE
Assessment/Plan


Assessment/Plan:


(1) Abdominal pain


(2) Diverticulitis 


(3) Lumbar Radiculopathy


(4) Lumbar DDD


Patient to be continued on Morphine as needed


D/w Dr. Saenz and he concurred.





Subjective


Date patient seen:  Jun 2, 2019


Time patient seen:  11:00 - am


Constitutional:  Reports: no symptoms


HEENT:  Reports: no symptoms


Cardiovascular:  Reports: no symptoms


Respiratory:  Reports: no symptoms


Gastrointestinal/Abdominal:  Reports: abdominal pain


Genitourinary:  Reports: no symptoms


Neurologic/Psychiatric:  Reports: no symptoms


Endocrine:  Reports: no symptoms


Hematologic/Lymphatic:  Reports: no symptoms


Allergies:  


Coded Allergies:  


     TRAMADOL (Verified  Allergy, Intermediate, 4/4/18)


 TREMORS AND VOMITING


     CODEINE (Verified  Adverse Reaction, Mild, 1/25/13)


 nausea


Subjective


Patient shows no signs of pain or distress. Pain has been tolerated on the 

Morphine 5 doses in the last 24hrs.





Objective





Last 24 Hour Vital Signs








  Date Time  Temp Pulse Resp B/P (MAP) Pulse Ox O2 Delivery O2 Flow Rate FiO2


 


6/2/19 09:08    163/75    


 


6/2/19 08:00 98.4 65 20 163/75 (104) 96   


 


6/2/19 04:00 98.2 63 16 136/54 (81) 98   


 


6/2/19 00:00 98.3 67 18 122/63 (82) 96   


 


6/1/19 21:00      Room Air  


 


6/1/19 20:00 99.3 65 16 152/70 (97) 99   


 


6/1/19 16:00 99.5 65 18 120/69 (86) 96   

















Intake and Output  


 


 6/1/19 6/2/19





 18:59 06:59


 


Intake Total 830.0 ml 834.45 ml


 


Output Total  550 ml


 


Balance 830.0 ml 284.45 ml


 


  


 


Intake Oral 720 ml 240 ml


 


IV Total 110.0 ml 594.45 ml


 


Output Urine Total  550 ml


 


# Voids 4 3








Laboratory Tests


6/2/19 05:00: 


White Blood Count 4.6L, Red Blood Count 3.23L, Hemoglobin 10.8L, Hematocrit 

32.0L, Mean Corpuscular Volume 99, Mean Corpuscular Hemoglobin 33.3H, Mean 

Corpuscular Hemoglobin Concent 33.7, Red Cell Distribution Width 10.7L, 

Platelet Count 191, Mean Platelet Volume 6.6, Neutrophils (%) (Auto) 56.8, 

Lymphocytes (%) (Auto) 28.3, Monocytes (%) (Auto) 11.2H, Eosinophils (%) (Auto) 

2.5, Basophils (%) (Auto) 1.2, Sodium Level 142, Potassium Level 3.6, Chloride 

Level 107, Carbon Dioxide Level 29, Anion Gap 6, Blood Urea Nitrogen 8, 

Creatinine 1.4H, Estimat Glomerular Filtration Rate , Glucose Level 97, Uric 

Acid 2.7, Calcium Level 8.9, Phosphorus Level 3.2, Magnesium Level 1.8, Total 

Bilirubin 1.7H, Direct Bilirubin 0.4H, Aspartate Amino Transf (AST/SGOT) 18, 

Alanine Aminotransferase (ALT/SGPT) 16, Alkaline Phosphatase 77, Total Protein 

6.1L, Albumin 2.8L, Globulin 3.3, Albumin/Globulin Ratio 0.8L


Height (Feet):  5


Height (Inches):  4.00


Weight (Pounds):  185


General Appearance:  no apparent distress, alert


EENT:  PERRL/EOMI, normal ENT inspection


Neck:  normal alignment, supple


Cardiovascular:  normal rate, regular rhythm


Respiratory/Chest:  lungs clear, normal breath sounds


Abdomen:  soft


Neurologic:  alert, oriented x 3


Skin:  warm/dry











Spencer Murphy Jun 2, 2019 12:27

## 2019-06-02 NOTE — GENERAL PROGRESS NOTE
Assessment/Plan


Problem List:  


(1) Abdominal pain


ICD Codes:  R10.9 - Unspecified abdominal pain


SNOMED:  59427694


(2) Colitis


ICD Codes:  K52.9 - Noninfective gastroenteritis and colitis, unspecified


SNOMED:  668176755


(3) Abdominal pain


ICD Codes:  R10.9 - Unspecified abdominal pain


SNOMED:  73565496, 920600970


(4) S/p nephrectomy


ICD Codes:  Z90.5 - Acquired absence of kidney


SNOMED:  29100969, 777076088


(5) Diverticulitis


ICD Codes:  K57.92 - Diverticulitis of intestine, part unspecified, without 

perforation or abscess without bleeding


SNOMED:  929037179


(6) Intractable pain


ICD Codes:  R52 - Pain, unspecified


SNOMED:  60045994


Status:  progressing


Assessment/Plan:


diverticulitis


still abdominal pain


afebrile


no gi bleed


clear liquid


\reveiwed chart and labs and meds





Subjective


ROS Limited/Unobtainable:  Yes


Allergies:  


Coded Allergies:  


     TRAMADOL (Verified  Allergy, Intermediate, 4/4/18)


 TREMORS AND VOMITING


     CODEINE (Verified  Adverse Reaction, Mild, 1/25/13)


 nausea





Objective





Last 24 Hour Vital Signs








  Date Time  Temp Pulse Resp B/P (MAP) Pulse Ox O2 Delivery O2 Flow Rate FiO2


 


6/2/19 17:30    145/71    


 


6/2/19 16:00 98.2 61 20 145/71 (95) 96   


 


6/2/19 15:00 98.4       


 


6/2/19 12:00 98.4 62 20 141/76 (97) 95   


 


6/2/19 09:37 98.4       


 


6/2/19 09:08    163/75    


 


6/2/19 09:00      Room Air  


 


6/2/19 08:00 98.4 65 20 163/75 (104) 96   


 


6/2/19 04:00 98.2 63 16 136/54 (81) 98   


 


6/2/19 00:00 98.3 67 18 122/63 (82) 96   

















Intake and Output  


 


 6/1/19 6/2/19





 19:00 07:00


 


Intake Total 830.0 ml 861.95 ml


 


Output Total  550 ml


 


Balance 830.0 ml 311.95 ml


 


  


 


Intake Oral 720 ml 240 ml


 


IV Total 110.0 ml 621.95 ml


 


Output Urine Total  550 ml


 


# Voids 4 3








Laboratory Tests


6/2/19 05:00: 


White Blood Count 4.6L, Red Blood Count 3.23L, Hemoglobin 10.8L, Hematocrit 

32.0L, Mean Corpuscular Volume 99, Mean Corpuscular Hemoglobin 33.3H, Mean 

Corpuscular Hemoglobin Concent 33.7, Red Cell Distribution Width 10.7L, 

Platelet Count 191, Mean Platelet Volume 6.6, Neutrophils (%) (Auto) 56.8, 

Lymphocytes (%) (Auto) 28.3, Monocytes (%) (Auto) 11.2H, Eosinophils (%) (Auto) 

2.5, Basophils (%) (Auto) 1.2, Sodium Level 142, Potassium Level 3.6, Chloride 

Level 107, Carbon Dioxide Level 29, Anion Gap 6, Blood Urea Nitrogen 8, 

Creatinine 1.4H, Estimat Glomerular Filtration Rate , Glucose Level 97, Uric 

Acid 2.7, Calcium Level 8.9, Phosphorus Level 3.2, Magnesium Level 1.8, Total 

Bilirubin 1.7H, Direct Bilirubin 0.4H, Aspartate Amino Transf (AST/SGOT) 18, 

Alanine Aminotransferase (ALT/SGPT) 16, Alkaline Phosphatase 77, Total Protein 

6.1L, Albumin 2.8L, Globulin 3.3, Albumin/Globulin Ratio 0.8L


Height (Feet):  5


Height (Inches):  4.00


Weight (Pounds):  185


Neck:  supple


Cardiovascular:  normal rate


Respiratory/Chest:  lungs clear


Abdomen:  tender











Jose Alberto Garcia MD Jun 2, 2019 21:40

## 2019-06-02 NOTE — NEPHROLOGY PROGRESS NOTE
Assessment/Plan


Problem List:  


(1) S/p nephrectomy


(2) Hypokalemia


(3) CKD (chronic kidney disease)


Assessment





Cr 1.4 slightly lower


previous right kidney cancer - tumor surgically removed per patient


extensive diverticulosis


Obese


Sz disorder


Asthma / COPD


HTN


Plan





not much to add from renal stand point


Per GI


Protonix added


check labs in am





Subjective


ROS Limited/Unobtainable:  No


Constitutional:  Reports: malaise





Objective


Objective





Last 24 Hour Vital Signs








  Date Time  Temp Pulse Resp B/P (MAP) Pulse Ox O2 Delivery O2 Flow Rate FiO2


 


6/2/19 15:00 98.4       


 


6/2/19 12:00 98.4 62 20 141/76 (97) 95   


 


6/2/19 09:37 98.4       


 


6/2/19 09:08    163/75    


 


6/2/19 09:00      Room Air  


 


6/2/19 08:00 98.4 65 20 163/75 (104) 96   


 


6/2/19 04:00 98.2 63 16 136/54 (81) 98   


 


6/2/19 00:00 98.3 67 18 122/63 (82) 96   


 


6/1/19 21:00      Room Air  


 


6/1/19 20:00 99.3 65 16 152/70 (97) 99   


 


6/1/19 16:00 99.5 65 18 120/69 (86) 96   

















Intake and Output  


 


 6/1/19 6/2/19





 19:00 07:00


 


Intake Total 830.0 ml 861.95 ml


 


Output Total  550 ml


 


Balance 830.0 ml 311.95 ml


 


  


 


Intake Oral 720 ml 240 ml


 


IV Total 110.0 ml 621.95 ml


 


Output Urine Total  550 ml


 


# Voids 4 3








Laboratory Tests


6/2/19 05:00: 


White Blood Count 4.6L, Red Blood Count 3.23L, Hemoglobin 10.8L, Hematocrit 

32.0L, Mean Corpuscular Volume 99, Mean Corpuscular Hemoglobin 33.3H, Mean 

Corpuscular Hemoglobin Concent 33.7, Red Cell Distribution Width 10.7L, 

Platelet Count 191, Mean Platelet Volume 6.6, Neutrophils (%) (Auto) 56.8, 

Lymphocytes (%) (Auto) 28.3, Monocytes (%) (Auto) 11.2H, Eosinophils (%) (Auto) 

2.5, Basophils (%) (Auto) 1.2, Sodium Level 142, Potassium Level 3.6, Chloride 

Level 107, Carbon Dioxide Level 29, Anion Gap 6, Blood Urea Nitrogen 8, 

Creatinine 1.4H, Estimat Glomerular Filtration Rate , Glucose Level 97, Uric 

Acid 2.7, Calcium Level 8.9, Phosphorus Level 3.2, Magnesium Level 1.8, Total 

Bilirubin 1.7H, Direct Bilirubin 0.4H, Aspartate Amino Transf (AST/SGOT) 18, 

Alanine Aminotransferase (ALT/SGPT) 16, Alkaline Phosphatase 77, Total Protein 

6.1L, Albumin 2.8L, Globulin 3.3, Albumin/Globulin Ratio 0.8L


Height (Feet):  5


Height (Inches):  4.00


Weight (Pounds):  185


General Appearance:  no apparent distress


Objective


no change











Julián Ely MD Jun 2, 2019 15:41

## 2019-06-02 NOTE — NUR
nurse's notes: no significant changes noted as of this time.  patient slept well with no 
reported c/o pain, n/v or any other distress.  dynahex bath performed early this shift; picc 
line dressing changed using aseptic technique; both procedures tolerated well by patient.  
vss; no incidents of falls, injuries or trauma reported.  will continue to monitor.

## 2019-06-02 NOTE — GENERAL PROGRESS NOTE
Assessment/Plan


Status:  progressing


Assessment/Plan:


Assessment/Plan:


Assessment


- N/V - improved, tolerating po


- abd pain - improved


- diarrhea


- h/o diverticulosis





Recommendations


- Advance diet to soft


- abx


- check stool Cx, C Diff


- OOB





Subjective


ROS Limited/Unobtainable:  Yes


Allergies:  


Coded Allergies:  


     TRAMADOL (Verified  Allergy, Intermediate, 4/4/18)


 TREMORS AND VOMITING


     CODEINE (Verified  Adverse Reaction, Mild, 1/25/13)


 nausea





Objective





Last 24 Hour Vital Signs








  Date Time  Temp Pulse Resp B/P (MAP) Pulse Ox O2 Delivery O2 Flow Rate FiO2


 


6/2/19 04:00 98.2 63 16 136/54 (81) 98   


 


6/2/19 00:00 98.3 67 18 122/63 (82) 96   


 


6/1/19 21:00      Room Air  


 


6/1/19 20:00 99.3 65 16 152/70 (97) 99   


 


6/1/19 16:00 99.5 65 18 120/69 (86) 96   


 


6/1/19 12:00 99.3 71 18 141/69 (93) 96   


 


6/1/19 09:00      Room Air  


 


6/1/19 08:00 99.3 81 18 144/87 (106) 96   

















Intake and Output  


 


 6/1/19 6/2/19





 19:00 07:00


 


Intake Total 830.0 ml 425.2 ml


 


Output Total  250 ml


 


Balance 830.0 ml 175.2 ml


 


  


 


Intake Oral 720 ml 


 


IV Total 110.0 ml 425.2 ml


 


Output Urine Total  250 ml


 


# Voids 4 1








Laboratory Tests


6/2/19 05:00: 


White Blood Count [Pending], Red Blood Count [Pending], Hemoglobin [Pending], 

Hematocrit [Pending], Mean Corpuscular Volume [Pending], Mean Corpuscular 

Hemoglobin [Pending], Mean Corpuscular Hemoglobin Concent [Pending], Red Cell 

Distribution Width [Pending], Platelet Count [Pending], Mean Platelet Volume [

Pending], Neutrophils (%) (Auto) [Pending], Lymphocytes (%) (Auto) [Pending], 

Monocytes (%) (Auto) [Pending], Eosinophils (%) (Auto) [Pending], Basophils (%) 

(Auto) [Pending], Sodium Level [Pending], Potassium Level [Pending], Chloride 

Level [Pending], Carbon Dioxide Level [Pending], Blood Urea Nitrogen [Pending], 

Creatinine [Pending], Estimat Glomerular Filtration Rate [Pending], Glucose 

Level [Pending], Uric Acid [Pending], Calcium Level [Pending], Phosphorus Level 

[Pending], Magnesium Level [Pending], Total Bilirubin [Pending], Aspartate 

Amino Transf (AST/SGOT) [Pending], Alanine Aminotransferase (ALT/SGPT) [Pending]

, Alkaline Phosphatase [Pending], Total Protein [Pending], Albumin [Pending], 

Globulin [Pending]


Height (Feet):  5


Height (Inches):  4.00


Weight (Pounds):  185


General Appearance:  alert


EENT:  normal ENT inspection


Neck:  supple


Cardiovascular:  normal rate


Respiratory/Chest:  lungs clear


Abdomen:  normal bowel sounds, non tender, soft


Extremities:  non-tender











Daniel Mcclendon MD Jun 2, 2019 06:30

## 2019-06-02 NOTE — NUR
NURSE NOTES:

RECEIVED PT FROM [facility]. PT IS AWAKE, RESTING IN BED, TALKING TO FAMILY MEMBER ON 
CELLPHONE. AAOX4, ON ROOM AIR, DENIES SOB. C/O PAIN 10/10 IN HER LOWER ABDOMEN. PICC LINE ON 
LEFT UPPER ARM IS INTACT AND PATENT. DRESSING IS DRY AND INTACT.  COMMODE NOTED AT BEDSIDE. 
BED IS LOCKED AT THE LOWEST POSITION, BED ALARMS ACTIVE, SIDE RAILS UP X2 AND CALL LIGHT IS 
WITHIN REACH. WILL CONTINUE TO MONITOR.

## 2019-06-03 NOTE — GENERAL PROGRESS NOTE
Assessment/Plan


Assessment/Plan:


(1) Abdominal pain


(2) Diverticulitis 


(3) Lumbar Radiculopathy


(4) Lumbar DDD


Patient to be continued on Morphine as needed


D/w Dr. Saenz and he concurred.





Subjective


Date patient seen:  Tripp 3, 2019


Time patient seen:  07:30 - am


Allergies:  


Coded Allergies:  


     TRAMADOL (Verified  Allergy, Intermediate, 4/4/18)


 TREMORS AND VOMITING


     CODEINE (Verified  Adverse Reaction, Mild, 1/25/13)


 nausea


Subjective


Constitutional:  Reports: no symptoms


HEENT:  Reports: no symptoms


Cardiovascular:  Reports: no symptoms


Respiratory:  Reports: no symptoms


Gastrointestinal/Abdominal:  Reports: abdominal pain


Genitourinary:  Reports: no symptoms


Neurologic/Psychiatric:  Reports: no symptoms


Endocrine:  Reports: no symptoms


Hematologic/Lymphatic:  Reports: no symptoms


  


Subjective


Patient is in bed and reports that the pain is tolerated on the morphine.





Objective





Last 24 Hour Vital Signs








  Date Time  Temp Pulse Resp B/P (MAP) Pulse Ox O2 Delivery O2 Flow Rate FiO2


 


6/3/19 04:00 97.8 68 17 138/75 (96) 100   


 


6/3/19 00:00 98.2 63 17 140/71 (94)    


 


6/2/19 21:00      Room Air  


 


6/2/19 20:00 98.5 85 18 157/86 (109) 97   


 


6/2/19 17:30    145/71    


 


6/2/19 16:00 98.2 61 20 145/71 (95) 96   


 


6/2/19 15:00 98.4       


 


6/2/19 12:00 98.4 62 20 141/76 (97) 95   


 


6/2/19 09:37 98.4       


 


6/2/19 09:08    163/75    


 


6/2/19 09:00      Room Air  

















Intake and Output  


 


 6/2/19 6/3/19





 19:00 07:00


 


Intake Total 973.25 ml 560.0 ml


 


Output Total 900 ml 400 ml


 


Balance 73.25 ml 160.0 ml


 


  


 


Intake Oral 600 ml 


 


IV Total 373.25 ml 560.0 ml


 


Output Urine Total 900 ml 400 ml








Height (Feet):  5


Height (Inches):  4.00


Weight (Pounds):  185


Objective


General Appearance:  no apparent distress, alert


EENT:  PERRL/EOMI, normal ENT inspection


Neck:  normal alignment, supple


Cardiovascular:  normal rate, regular rhythm


Respiratory/Chest:  lungs clear, normal breath sounds


Abdomen:  soft


Neurologic:  alert, oriented x 3


Skin:  warm/dry











Spencer Murphy Tripp 3, 2019 08:50

## 2019-06-03 NOTE — NEPHROLOGY PROGRESS NOTE
Assessment/Plan


Problem List:  


(1) S/p nephrectomy


(2) Hypokalemia


(3) CKD (chronic kidney disease)


Assessment





Cr 1.4 slightly lower


previous right kidney cancer - tumor surgically removed per patient


extensive diverticulosis


Obese


Sz disorder


Asthma / COPD


HTN


Plan





not much to add from renal stand point


Per GI


Protonix added


check labs in am





Subjective


ROS Limited/Unobtainable:  No





Objective


Objective





Last 24 Hour Vital Signs








  Date Time  Temp Pulse Resp B/P (MAP) Pulse Ox O2 Delivery O2 Flow Rate FiO2


 


6/3/19 09:00    140/76    


 


6/3/19 09:00      Room Air  


 


6/3/19 08:00 98.6 65 19 140/76 (97) 97   


 


6/3/19 04:00 97.8 68 17 138/75 (96) 100   


 


6/3/19 00:00 98.2 63 17 140/71 (94)    


 


6/2/19 21:00      Room Air  


 


6/2/19 20:00 98.5 85 18 157/86 (109) 97   


 


6/2/19 17:30    145/71    


 


6/2/19 16:00 98.2 61 20 145/71 (95) 96   


 


6/2/19 15:00 98.4       


 


6/2/19 12:00 98.4 62 20 141/76 (97) 95   

















Intake and Output  


 


 6/2/19 6/3/19





 18:59 06:59


 


Intake Total 1000.75 ml 560.0 ml


 


Output Total 900 ml 400 ml


 


Balance 100.75 ml 160.0 ml


 


  


 


Intake Oral 600 ml 


 


IV Total 400.75 ml 560.0 ml


 


Output Urine Total 900 ml 400 ml








Height (Feet):  5


Height (Inches):  4.00


Weight (Pounds):  185


General Appearance:  no apparent distress


Objective


no change











Julián Ely MD Tripp 3, 2019 10:50

## 2019-06-03 NOTE — GENERAL PROGRESS NOTE
Assessment/Plan


Status:  progressing


Assessment/Plan:


Assessment


- N/V - resolved


- abd pain - improved


- diarrhea - improved


- diverticulosis / diverticulitis


- Elevated Bili





Recommendations


- push po


- abx


- OOB


- d/c planning


- f/u LFT as outpatient





Subjective


Allergies:  


Coded Allergies:  


     TRAMADOL (Verified  Allergy, Intermediate, 4/4/18)


 TREMORS AND VOMITING


     CODEINE (Verified  Adverse Reaction, Mild, 1/25/13)


 nausea


Subjective


Feels better


improved N/V


improved Pain,


tolerating solids





Objective





Last 24 Hour Vital Signs








  Date Time  Temp Pulse Resp B/P (MAP) Pulse Ox O2 Delivery O2 Flow Rate FiO2


 


6/3/19 04:00 97.8 68 17 138/75 (96) 100   


 


6/3/19 00:00 98.2 63 17 140/71 (94)    


 


6/2/19 21:00      Room Air  


 


6/2/19 20:00 98.5 85 18 157/86 (109) 97   


 


6/2/19 17:30    145/71    


 


6/2/19 16:00 98.2 61 20 145/71 (95) 96   


 


6/2/19 15:00 98.4       


 


6/2/19 12:00 98.4 62 20 141/76 (97) 95   


 


6/2/19 09:37 98.4       


 


6/2/19 09:08    163/75    


 


6/2/19 09:00      Room Air  

















Intake and Output  


 


 6/2/19 6/3/19





 19:00 07:00


 


Intake Total 973.25 ml 560.0 ml


 


Output Total 900 ml 400 ml


 


Balance 73.25 ml 160.0 ml


 


  


 


Intake Oral 600 ml 


 


IV Total 373.25 ml 560.0 ml


 


Output Urine Total 900 ml 400 ml








Height (Feet):  5


Height (Inches):  4.00


Weight (Pounds):  185


Objective


Obese AA woman


NCAT


supple


CTA


RR 


abd soft ND, (+) mild LLQ TTP


no edema











Blanca Couch MD Tripp 3, 2019 08:57

## 2019-06-03 NOTE — CODER PHYSICIAN QUERY
Clarification is required for compliance, coding accuracy, and to reflect 
severity of illness for this patient



Dear Dr. Elias Fitzgerald           Date: 6/3/2019

/CDS Name: She Hale          



Clinical Documentation States: 5/28 gastroenterology note: 74-year-old  
American woman...about 3 days ago, her symptoms escalated and she had bouts of 
nausea, vomiting, and diarrhea.  First few days, diarrhea was brown then 
subsequently some blood was seen in it...Diverticular disease may be a 
consideration, but the presentation is more consistent with hemorrhoidal 
issues. 



Labs: 

5/28 RBC 4.17 (L), Hb 13.9, Hct 39.7

5/29 RBC 3.46 (L), Hb 11.5 (L), Hct 34.3 (L)

6/2  RBC 3.23 (L), Hb 10.8 (L), Hct 32 (L)



Please respond to the following question: Is there a diagnosis specific to 
these symptoms or values? If so please state below.



PHYSICIAN RESPONSE:



[] Drop in hematocrit

[] Drop in hemoglobin

[] Chronic blood loss anemia

[] Acute blood loss anemia

[] Other ________________

[] Unknown



Present on Admission:  []  Yes          []  No         []  Clinically 
Undetermined



_________________                                    _____________

Physician signature                                       Date



Please also document in your Progress Notes and/or Discharge Summary and 
indicate if the condition was present on admission.



MTDD

## 2019-06-03 NOTE — NUR
NURSE NOTES:

PT HAD 2 LOOSE BM, RESTLESS, CONFUSED AND TRIED TO GET OUT OF BED. FALL RISK IMPLEMENTED. 
BED ALARMS ACTIVE, SIDE RAILS UP X3, YELLOW SOCKS AND YELLOW GOWN ON. INFORMED . 
WAITING FOR ORDERS. WILL FOLLOW UP.

-------------------------------------------------------------------------------

Addendum: 06/03/19 at 0452 by Tamra Pandey RN

-------------------------------------------------------------------------------

DISREGARD. INCORRECT PT. 

TB.

## 2019-06-03 NOTE — INFECTIOUS DISEASES PROG NOTE
Assessment/Plan


Assessment/Plan


IMPRESSION:  


Sigmoid diverticulitis.  


Hypertension, 


COPD,


asthma, 


chronic kidney disease, 


renal cell cancer, 


status post nephrectomy.





RECOMMENDATION:  


Continue IV Zosyn X 1 days





Subjective


ROS Limited/Unobtainable:  No


Constitutional:  Reports: no symptoms


Respiratory:  Reports: no symptoms


Cardiovascular:  Reports: no symptoms


Gastrointestinal/Abdominal:  Reports: other - lower abdominal pain


Genitourinary:  Reports: no symptoms


Allergies:  


Coded Allergies:  


     TRAMADOL (Verified  Allergy, Intermediate, 4/4/18)


 TREMORS AND VOMITING


     CODEINE (Verified  Adverse Reaction, Mild, 1/25/13)


 nausea





Objective


Vital Signs





Last 24 Hour Vital Signs








  Date Time  Temp Pulse Resp B/P (MAP) Pulse Ox O2 Delivery O2 Flow Rate FiO2


 


6/3/19 09:31 97.8       


 


6/3/19 09:00    140/76    


 


6/3/19 09:00      Room Air  


 


6/3/19 08:00 98.6 65 19 140/76 (97) 97   


 


6/3/19 04:00 97.8 68 17 138/75 (96) 100   


 


6/3/19 00:00 98.2 63 17 140/71 (94)    


 


6/2/19 21:00      Room Air  


 


6/2/19 20:00 98.5 85 18 157/86 (109) 97   


 


6/2/19 17:30    145/71    


 


6/2/19 16:00 98.2 61 20 145/71 (95) 96   








Height (Feet):  5


Height (Inches):  4.00


Weight (Pounds):  185


General Appearance:  no acute distress


HEENT:  mucous membranes moist


Respiratory/Chest:  lungs clear


Cardiovascular:  normal rate


Abdomen:  other - lower abdomen tenderness


Extremities:  no edema


Neurologic/Psychiatric:  alert, oriented x 3, responsive





Microbiology








 Date/Time


Source Procedure


Growth Status


 


 


 6/1/19 10:00


Stool Stool Culture - Preliminary


NO SALMONELLA,SHIGELLA,OR CAMPYLOBACT... Resulted











Current Medications








 Medications


  (Trade)  Dose


 Ordered  Sig/George


 Route


 PRN Reason  Start Time


 Stop Time Status Last Admin


Dose Admin


 


 Acetaminophen


  (Tylenol)  650 mg  Q4H  PRN


 ORAL


 Mild Pain/Temp > 100.5  5/28/19 23:30


 6/27/19 23:29  6/2/19 09:07


 


 


 Atorvastatin


 Calcium


  (Lipitor)  10 mg  BEDTIME


 ORAL


   6/2/19 21:00


 7/2/19 20:59  6/2/19 20:16


 


 


 Cetylpyridinium


 Chloride


  (Cepacol)  1 lozg  Q4H  PRN


 CELESTE


 sore throat  6/3/19 08:15


 7/3/19 08:14  6/3/19 08:59


 


 


 Chlorhexidine


 Gluconate


  (Rut-Hex 2%)  1 applic  DAILY@2000


 TOPIC


   5/29/19 20:00


 6/28/19 19:59  6/2/19 20:16


 


 


 Hydralazine HCl


  (Apresoline)  25 mg  BID


 ORAL


   6/2/19 09:00


 7/2/19 08:59  6/3/19 09:00


 


 


 Hydrochlorothiazide


  (Hydrodiuril)  12.5 mg  DAILY


 ORAL


   6/2/19 09:00


 7/2/19 08:59  6/3/19 09:00


 


 


 Iopamidol


  (Isovue-300


 100ml)  100 ml  NOW  PRN


 INJ


 Radiology Procedure  5/28/19 20:30


     


 


 


 Morphine Sulfate


  (Morphine


 Sulfate)  4 mg  Q4H  PRN


 IVP


 Severe Pain (Pain Scale 7-10)  6/2/19 12:48


 6/9/19 12:47  6/3/19 09:01


 


 


 Ondansetron HCl


  (Zofran)  4 mg  Q6H  PRN


 IVP


 Nausea & Vomiting  5/29/19 00:45


 6/28/19 00:44  6/3/19 09:00


 


 


 Pantoprazole


  (Protonix)  40 mg  EVERY 12  HOURS


 ORAL


   5/31/19 21:00


 6/30/19 20:59  6/3/19 08:59


 


 


 Piperacillin Sod/


 Tazobactam Sod


 3.375 gm/Sodium


 Chloride  110 ml @ 


 27.5 mls/hr  Q8HR


 IVPB


   5/30/19 11:30


 6/6/19 11:29  6/3/19 05:10


 


 


 Sodium Chloride  1,000 ml @ 


 50 mls/hr  Q20H


 IV


   5/31/19 13:00


 6/27/19 12:59  6/2/19 05:17


 

















Leonardo Jacob MD Tripp 3, 2019 14:19

## 2019-06-03 NOTE — HEMATOLOGY/ONC PROGRESS NOTE
Assessment/Plan


Assessment/Plan


ASSESSMENT AND PLAN 


# Renal cell carcinoma, status post nephrectomy that was completed 

approximately 1 month ago, pathology was reviewed and is pT1 staging does not 

require further treatment, lymph nodes not submitted, but no evidence of 

distant metastasis, also no evidence of positive margins noted on the tumor 

report


--> CT abd: 4 mm rectangular density in the right middle lobe, visible on 

earlier 2015 study and unchanged, presumed benign


--> trend cr as per renal, baseline, cr is 1.4-1.5


--> cr trend: 1.4


--> monitor with serial imaging as needed


# Leukopenia is likely a reactive process v infection


--> smear peripherla has been reviewed


--> hepatitis and hiv in the past neg


--> no cirrhosis or hsm on us


--> thyroid studies are wnl


# Anemia of chronic disease


--> closely monitor 


--> currently is stable at 10.8


--> hold off any further workup at this time


# Sigmoid diverticulitis. ID and GI are following, appreciate recs. 


--> Abx per ID. 


--> c. diff testing as needed


--> PPI


# Hypertension. 


--> sbp goal <140


# COPD. 


# Chronic kidney disease. cr 1.4-1.5








The time note is entered does not reflect time of encounter.





GREATLY APPRECIATE CONSULTATION





Subjective


Allergies:  


Coded Allergies:  


     TRAMADOL (Verified  Allergy, Intermediate, 4/4/18)


 TREMORS AND VOMITING


     CODEINE (Verified  Adverse Reaction, Mild, 1/25/13)


 nausea


Subjective


6/02: Pt alert and awake. No acute events.


6/03: Pt shows no signs of distress, pain tolerated





Objective


Objective





Current Medications








 Medications


  (Trade)  Dose


 Ordered  Sig/George


 Route


 PRN Reason  Start Time


 Stop Time Status Last Admin


Dose Admin


 


 Acetaminophen


  (Tylenol)  650 mg  Q4H  PRN


 ORAL


 Mild Pain/Temp > 100.5  5/28/19 23:30


 6/27/19 23:29  6/2/19 09:07


 


 


 Atorvastatin


 Calcium


  (Lipitor)  10 mg  BEDTIME


 ORAL


   6/2/19 21:00


 7/2/19 20:59  6/2/19 20:16


 


 


 Cetylpyridinium


 Chloride


  (Cepacol)  1 lozg  Q4H  PRN


 CELESTE


 sore throat  6/3/19 08:15


 7/3/19 08:14  6/3/19 08:59


 


 


 Chlorhexidine


 Gluconate


  (Rut-Hex 2%)  1 applic  DAILY@2000


 TOPIC


   5/29/19 20:00


 6/28/19 19:59  6/2/19 20:16


 


 


 Hydralazine HCl


  (Apresoline)  25 mg  BID


 ORAL


   6/2/19 09:00


 7/2/19 08:59  6/3/19 17:48


 


 


 Hydrochlorothiazide


  (Hydrodiuril)  12.5 mg  DAILY


 ORAL


   6/2/19 09:00


 7/2/19 08:59  6/3/19 09:00


 


 


 Iopamidol


  (Isovue-300


 100ml)  100 ml  NOW  PRN


 INJ


 Radiology Procedure  5/28/19 20:30


     


 


 


 Morphine Sulfate


  (Morphine


 Sulfate)  4 mg  Q4H  PRN


 IVP


 Severe Pain (Pain Scale 7-10)  6/2/19 12:48


 6/9/19 12:47  6/3/19 14:45


 


 


 Ondansetron HCl


  (Zofran)  4 mg  Q6H  PRN


 IVP


 Nausea & Vomiting  5/29/19 00:45


 6/28/19 00:44  6/3/19 09:00


 


 


 Pantoprazole


  (Protonix)  40 mg  EVERY 12  HOURS


 ORAL


   5/31/19 21:00


 6/30/19 20:59  6/3/19 08:59


 


 


 Piperacillin Sod/


 Tazobactam Sod


 3.375 gm/Sodium


 Chloride  110 ml @ 


 27.5 mls/hr  Q8HR


 IVPB


   5/30/19 11:30


 6/6/19 11:29  6/3/19 14:44


 


 


 Sodium Chloride  1,000 ml @ 


 50 mls/hr  Q20H


 IV


   5/31/19 13:00


 6/27/19 12:59  6/2/19 05:17


 











Last 24 Hour Vital Signs








  Date Time  Temp Pulse Resp B/P (MAP) Pulse Ox O2 Delivery O2 Flow Rate FiO2


 


6/3/19 17:48    140/80    


 


6/3/19 16:00 98.5 73 19 140/80 (100) 98   


 


6/3/19 15:15 98.5       


 


6/3/19 12:00 98.7 68 20 139/77 (97) 98   


 


6/3/19 09:00    140/76    


 


6/3/19 09:00      Room Air  


 


6/3/19 08:00 98.6 65 19 140/76 (97) 97   


 


6/3/19 04:00 97.8 68 17 138/75 (96) 100   


 


6/3/19 00:00 98.2 63 17 140/71 (94)    


 


6/2/19 21:00      Room Air  


 


6/2/19 20:00 98.5 85 18 157/86 (109) 97   


 


6/2/19 17:30    145/71    


 


6/2/19 16:00 98.2 61 20 145/71 (95) 96   


 


6/2/19 12:00 98.4 62 20 141/76 (97) 95   


 


6/2/19 09:37 98.4       


 


6/2/19 09:08    163/75    


 


6/2/19 09:00      Room Air  


 


6/2/19 08:00 98.4 65 20 163/75 (104) 96   


 


6/2/19 04:00 98.2 63 16 136/54 (81) 98   


 


6/2/19 00:00 98.3 67 18 122/63 (82) 96   


 


6/1/19 21:00      Room Air  


 


6/1/19 20:00 99.3 65 16 152/70 (97) 99   

















Intake and Output  


 


 6/2/19 6/3/19





 19:00 07:00


 


Intake Total 973.25 ml 587.5 ml


 


Output Total 900 ml 400 ml


 


Balance 73.25 ml 187.5 ml


 


  


 


Intake Oral 600 ml 


 


IV Total 373.25 ml 587.5 ml


 


Output Urine Total 900 ml 400 ml











Labs








Test


  6/2/19


05:00


 


White Blood Count


  4.6 K/UL


(4.8-10.8)


 


Red Blood Count


  3.23 M/UL


(4.20-5.40)


 


Hemoglobin


  10.8 G/DL


(12.0-16.0)


 


Hematocrit


  32.0 %


(37.0-47.0)


 


Mean Corpuscular Volume 99 FL (80-99) 


 


Mean Corpuscular Hemoglobin


  33.3 PG


(27.0-31.0)


 


Mean Corpuscular Hemoglobin


Concent 33.7 G/DL


(32.0-36.0)


 


Red Cell Distribution Width


  10.7 %


(11.6-14.8)


 


Platelet Count


  191 K/UL


(150-450)


 


Mean Platelet Volume


  6.6 FL


(6.5-10.1)


 


Neutrophils (%) (Auto)


  56.8 %


(45.0-75.0)


 


Lymphocytes (%) (Auto)


  28.3 %


(20.0-45.0)


 


Monocytes (%) (Auto)


  11.2 %


(1.0-10.0)


 


Eosinophils (%) (Auto)


  2.5 %


(0.0-3.0)


 


Basophils (%) (Auto)


  1.2 %


(0.0-2.0)


 


Sodium Level


  142 MMOL/L


(136-145)


 


Potassium Level


  3.6 MMOL/L


(3.5-5.1)


 


Chloride Level


  107 MMOL/L


()


 


Carbon Dioxide Level


  29 MMOL/L


(21-32)


 


Anion Gap


  6 mmol/L


(5-15)


 


Blood Urea Nitrogen 8 mg/dL (7-18) 


 


Creatinine


  1.4 MG/DL


(0.55-1.30)


 


Estimat Glomerular Filtration


Rate  mL/min (>60) 


 


 


Glucose Level


  97 MG/DL


()


 


Uric Acid


  2.7 MG/DL


(2.6-7.2)


 


Calcium Level


  8.9 MG/DL


(8.5-10.1)


 


Phosphorus Level


  3.2 MG/DL


(2.5-4.9)


 


Magnesium Level


  1.8 MG/DL


(1.8-2.4)


 


Total Bilirubin


  1.7 MG/DL


(0.2-1.0)


 


Direct Bilirubin


  0.4 MG/DL


(0.0-0.3)


 


Aspartate Amino Transf


(AST/SGOT) 18 U/L (15-37) 


 


 


Alanine Aminotransferase


(ALT/SGPT) 16 U/L (12-78) 


 


 


Alkaline Phosphatase


  77 U/L


()


 


Total Protein


  6.1 G/DL


(6.4-8.2)


 


Albumin


  2.8 G/DL


(3.4-5.0)


 


Globulin 3.3 g/dL 


 


Albumin/Globulin Ratio 0.8 (1.0-2.7) 








Height (Feet):  5


Height (Inches):  4.00


Weight (Pounds):  185


Objective


PHYSICAL EXAMINATION:


VITAL SIGNS:  Reviewed. 


HEENT:  PERRLA.


NECK:  Supple.  No lymphadenopathy.


CHEST:  Clear to auscultation.


CARDIOVASCULAR:  Regular rate and rhythm.  No murmurs or extra sounds.


GASTROINTESTINAL:  Diffuse abdominal pain.  Abdomen is soft.  No rebound. No 

organomegaly.


EXTREMITIES:  A 1+ edema.  Reflexes equal in both sides.  Moving all four 

extremities.











Thomas Davis MD Tripp 3, 2019 19:17

## 2019-06-03 NOTE — NUR
NURSE NOTES:

RECEIVED PT FROM SIMON SANTOS. PT IS AWAKE, AAOX4. C/O PAIN 10/10 IN ABDOMEN AND SORE THROAT. 
PICC ON L UA IS PATENT AND INTACT. DRESSING IS DRY. BED IS LOCKED AT THE LOWEST POSITION, 
BED ALARMS ACTIVE, SIDE RAILS UP X2, AND CALL LIGHT IS WITHIN REACH. WILL CONTINUE TO 
MONITOR.

## 2019-06-04 NOTE — INFECTIOUS DISEASES PROG NOTE
Assessment/Plan


Assessment/Plan


IMPRESSION:  


Sigmoid diverticulitis.  


Hypertension, 


COPD,


asthma, 


chronic kidney disease, 


renal cell cancer, 


status post nephrectomy.





RECOMMENDATION:  


Discontinue IV Zosyn


remove PICC line before discharge





Subjective


ROS Limited/Unobtainable:  No


Constitutional:  Reports: no symptoms, other - feels better


Gastrointestinal/Abdominal:  Reports: other - lowe abdominal pain


Allergies:  


Coded Allergies:  


     TRAMADOL (Verified  Allergy, Intermediate, 4/4/18)


 TREMORS AND VOMITING


     CODEINE (Verified  Adverse Reaction, Mild, 1/25/13)


 nausea





Objective


Vital Signs





Last 24 Hour Vital Signs








  Date Time  Temp Pulse Resp B/P (MAP) Pulse Ox O2 Delivery O2 Flow Rate FiO2


 


6/4/19 12:00 98.4 69 16 125/62 (83) 99   


 


6/4/19 09:06 98.8       


 


6/4/19 09:00      Room Air  


 


6/4/19 08:36    134/64    


 


6/4/19 08:00 98.2 75 16 134/74 (94) 99   


 


6/4/19 04:00 98.8 75 16 134/64 (87) 97   


 


6/4/19 00:00 98.5 67 16 142/64 (90) 67   


 


6/3/19 21:00      Room Air  


 


6/3/19 20:00 98.5 69 16 149/79 (102) 100   


 


6/3/19 17:48    140/80    


 


6/3/19 16:00 98.5 73 19 140/80 (100) 98   








Height (Feet):  5


Height (Inches):  4.00


Weight (Pounds):  185


General Appearance:  no acute distress


HEENT:  mucous membranes moist


Respiratory/Chest:  lungs clear


Cardiovascular:  normal rate


Abdomen:  distended


Extremities:  no edema


Neurologic/Psychiatric:  alert, oriented x 3, responsive





Current Medications








 Medications


  (Trade)  Dose


 Ordered  Sig/George


 Route


 PRN Reason  Start Time


 Stop Time Status Last Admin


Dose Admin


 


 Acetaminophen


  (Tylenol)  650 mg  Q4H  PRN


 ORAL


 Mild Pain/Temp > 100.5  5/28/19 23:30


 6/27/19 23:29  6/2/19 09:07


 


 


 Atorvastatin


 Calcium


  (Lipitor)  10 mg  BEDTIME


 ORAL


   6/2/19 21:00


 7/2/19 20:59  6/3/19 20:14


 


 


 Cetylpyridinium


 Chloride


  (Cepacol)  1 lozg  Q4H  PRN


 CELESTE


 sore throat  6/3/19 08:15


 7/3/19 08:14  6/4/19 04:02


 


 


 Chlorhexidine


 Gluconate


  (Rut-Hex 2%)  1 applic  DAILY@2000


 TOPIC


   5/29/19 20:00


 6/28/19 19:59  6/3/19 20:14


 


 


 Hydralazine HCl


  (Apresoline)  25 mg  Q8HR


 ORAL


   6/4/19 14:00


 7/2/19 08:59   


 


 


 Iopamidol


  (Isovue-300


 100ml)  100 ml  NOW  PRN


 INJ


 Radiology Procedure  5/28/19 20:30


     


 


 


 Morphine Sulfate


  (Morphine


 Sulfate)  4 mg  Q4H  PRN


 IVP


 Severe Pain (Pain Scale 7-10)  6/2/19 12:48


 6/9/19 12:47  6/4/19 08:36


 


 


 Ondansetron HCl


  (Zofran)  4 mg  Q6H  PRN


 IVP


 Nausea & Vomiting  5/29/19 00:45


 6/28/19 00:44  6/3/19 20:14


 


 


 Pantoprazole


  (Protonix)  40 mg  EVERY 12  HOURS


 ORAL


   5/31/19 21:00


 6/30/19 20:59  6/4/19 08:36


 

















Leonardo Jacob MD Jun 4, 2019 14:08

## 2019-06-04 NOTE — DIAGNOSTIC IMAGING REPORT
Indication: Left ankle pain

 

Technique: 3 views of the left ankle

 

Comparison: 5/6/2014 

 

Findings: No acute fractures. No dislocations. The joint spaces are preserved

 

Impression: No acute process

## 2019-06-04 NOTE — NUR
NURSE NOTES:

Patient discharged.  IV and ID band removed.  Belongings accounted for.  Discharge 
instructions given and patient verbalized understanding.  Patient escorted from facility to 
private vehicle without incident or injury.

## 2019-06-04 NOTE — NUR
NURSE NOTES:

PT C/O PAIN ON LEFT ANKLE THIS MORNING WHEN SHE GOT UP TO USE THE COMMODE. LEFT ANKLE IS 
PAINFUL TO TOUCH, WARM TO TOUCH, HOMANS SIGN NOTED. NO SWELLING.  NOTIFIED. 
RECEIVED ORDER FOR A STAT XRAY. WILL CONTINUE TO MONITOR.

## 2019-06-04 NOTE — NEPHROLOGY PROGRESS NOTE
Assessment/Plan


Problem List:  


(1) S/p nephrectomy


(2) Hypokalemia


(3) CKD (chronic kidney disease)


(4) Anemia


Assessment





Cr 1.4 slightly lower


previous right kidney cancer - tumor surgically removed per patient


extensive diverticulosis


Obese


Sz disorder


Asthma / COPD


HTN





.


Plan





stop IV fluids and diuretics


Adjust Hydralazine dose


not much to add from renal stand point


Per GI


Protonix added


check labs in am / anemia walters





Subjective


ROS Limited/Unobtainable:  No


Constitutional:  Reports: malaise, other - ankle pain





Objective


Objective





Last 24 Hour Vital Signs








  Date Time  Temp Pulse Resp B/P (MAP) Pulse Ox O2 Delivery O2 Flow Rate FiO2


 


6/4/19 08:36    134/64    


 


6/4/19 04:00 98.8 75 16 134/64 (87) 97   


 


6/4/19 00:00 98.5 67 16 142/64 (90) 67   


 


6/3/19 21:00      Room Air  


 


6/3/19 20:00 98.5 69 16 149/79 (102) 100   


 


6/3/19 17:48    140/80    


 


6/3/19 16:00 98.5 73 19 140/80 (100) 98   


 


6/3/19 15:15 98.5       


 


6/3/19 12:00 98.7 68 20 139/77 (97) 98   

















Intake and Output  


 


 6/3/19 6/4/19





 19:00 07:00


 


Intake Total 1310.0 ml 160.0 ml


 


Output Total  400 ml


 


Balance 1310.0 ml -240.0 ml


 


  


 


Intake Oral 1000 ml 


 


IV Total 310.0 ml 160.0 ml


 


Output Urine Total  400 ml


 


# Voids 6 








Height (Feet):  5


Height (Inches):  4.00


Weight (Pounds):  185


General Appearance:  no apparent distress


Extremities:  trace edema


Objective


no change











Julián Ely MD Jun 4, 2019 09:32

## 2019-06-04 NOTE — GENERAL PROGRESS NOTE
Assessment/Plan


Assessment/Plan:


Assessment


- N/V - resolved


- abd pain - improved


- diarrhea - improved


- diverticulosis / diverticulitis


- Elevated Bili





Recommendations


- push po


- abx


- OOB


- d/c planning


- f/u LFT as outpatient





Subjective


Allergies:  


Coded Allergies:  


     TRAMADOL (Verified  Allergy, Intermediate, 4/4/18)


 TREMORS AND VOMITING


     CODEINE (Verified  Adverse Reaction, Mild, 1/25/13)


 nausea


Subjective


Feels better


improved N/V


improved Pain,


tolerating solids


for discharge today





Objective





Last 24 Hour Vital Signs








  Date Time  Temp Pulse Resp B/P (MAP) Pulse Ox O2 Delivery O2 Flow Rate FiO2


 


6/4/19 12:00 98.4 69 16 125/62 (83) 99   


 


6/4/19 09:06 98.8       


 


6/4/19 09:00      Room Air  


 


6/4/19 08:36    134/64    


 


6/4/19 08:00 98.2 75 16 134/74 (94) 99   


 


6/4/19 04:00 98.8 75 16 134/64 (87) 97   


 


6/4/19 00:00 98.5 67 16 142/64 (90) 67   

















Intake and Output  


 


 6/3/19 6/4/19





 19:00 07:00


 


Intake Total 1310.0 ml 160.0 ml


 


Output Total  400 ml


 


Balance 1310.0 ml -240.0 ml


 


  


 


Intake Oral 1000 ml 


 


IV Total 310.0 ml 160.0 ml


 


Output Urine Total  400 ml


 


# Voids 6 








Height (Feet):  5


Height (Inches):  4.00


Weight (Pounds):  185


Objective


Obese AA woman


NCAT


supple


CTA


RR 


abd soft ND, (+) mild LLQ TTP


no edema











Blanca Couch MD Jun 4, 2019 22:26

## 2019-06-04 NOTE — NUR
HAND-OFF: 

Report given to SIMON LA. Pt is in stable condition, XRAY in process. Endorsed to AM 
nurse to notify  XRAY results.

## 2019-06-04 NOTE — NUR
NURSE NOTES:

Received patient sitting up comfortably in bed.  Left upper arm PICC, infusing 1/2 NS @ 
50ml/hour.  Commode at bedside.  Bed at lowest level with 3 side rails up.  Call light 
within reach.  In no apparent distress at this time.  Will continue to monitor.

## 2019-06-05 NOTE — DISCHARGE SUMMARY
Discharge Summary


Discharge Summary


_


DATE OF ADMISSION: 5/28/2019





DATE OF DISCHARGE: 6/4/2019








DISCHARGED BY: Dr Garcia





REASON FOR ADMISSION: 


74 years old female with past medical history of hypertension, COPD/asthma, 

diabetes mellitus, seizure disorder, ulcerative colitis, neuropathy, presented 

with diffuse abdominal pain.  Patient developed bloody diarrhea.  Patient 

reported significant amount of rectal bleeding.  Patient reported being 

fatigued.  She denied fever or chills.  She reported several episodes of nausea 

and vomiting.  No blood in emesis.  She denied dysuria or hematuria.  Upon 

evaluation patient was slightly tachycardic blood pressure was 147/77 no fever.

  Laboratory work-up revealed no leukocytosis hemoglobin 13.9 hematocrit 39.7.  

INR 1.0.  Stable electrolytes.  BUN 26, creatinine 1.5.  Stable LFT and lipase.

  Urinalysis revealed +2 protein no evidence of UTI.  CT of the abdomen and 

pelvis revealed probably early uncomplicated acute diverticulitis of the 

sigmoid.


Extensive colonic diverticulosis elsewhere


4 mm rectangular density in the right middle lobe, visible on earlier 2015 

study and


unchanged, presumed benign


EKG revealed sinus tachycardia , no acute ischemic changes.  


 


In emergency department patient received analgesia and antiemetic , and started 

on  broad spectrum antibiotics.  


Patient admitted for diverticulitis due to intractable pain and multiply 

episodes of vomiting. 











CONSULTANTS:


ID specialist Dr. Hdz


GI specialist Dr. Couch


nephrologist Dr. Ely


hematologist/oncologist Dr. Davis


pain specialist Dr. Saenz





HOSPITAL COURSE: 


Patient admitted to medical surgical floor.  


Antibiotic provided as per ID specialist recommendation.  


Stool culture was negative.  


Patient completed antibiotic treatment while in the hospital.  


PICC line was discontinued prior to discharge.





Patient initially was hydrated with IV fluids.  


Nephrologist followed.  Patient  with history of  right renal rig carcinoma,  

status post nephrectomy. 


Renal parameters  and electrolytes were closely monitored.  


Electrolytes corrected as needed , nephrotoxins were avoided.  


IV fluids stopped.


Prior to discharge creatinine slightly down from initial 1.5 to 1.4 . All 

electrolytes stable.





GI specialist followed. 


Symptomatic treatment provided.  


Pain management was addressed as per pain specialist recommendation.


Antibiotics continued.  Antiemetic provided as needed.


Diarrhea resolved.  Nausea and vomiting resolved.


GI prophylaxis provided


Patient noted to have elevated total bilirubin 1.7, direct bilirubin 0.4.


GI specialist recommended follow-up with as outpatient .


 


Blood pressure was managed with  current antihypertensive  regimen and remained 

stable.  


Statin continued.  Lipid panel stable. 


Pulse oximetry was stable on room air.  No evidence of respiratory distress or 

COPD/asthma exacerbation.


TSH within normal limits.


Hemoglobin and hematocrit were closely monitored with goal to keep hemoglobin 

above 7.  Prior to discharge hemoglobin 10.8, hematocrit 32.





Oncologist followed.  


Patient status post right nephrectomy completed about a month ago.  


Pathology review y indicated pT 1 staging , which not require further 

treatment.  No evidence of distant metastasis.  


No evidence of positive margin on the tumor report.  


Right middle lobe rectangular density 4 mm without prior changes from 2015 ( 

seen on curetn CT scan of abdomen), presumed benign.  


Patient to follow-up with her nephrologist and oncologist as outpatient.








Patient clinically stabilized and was ready for discharge home.  Extensive 

teaching regarding diverticular disease provided.








FINAL DIAGNOSES: 


Sigmoid diverticulitis with extensive colonic diverticulosis


Hypertension


COPD/asthma 


Chronic kidney disease


Renal cell carcinoma , right


Status post right nephrectomy  


Anemia of chronic disease


Lumbar radiculopathy


Lumbar DDD








DISCHARGE MEDICATIONS:


See Medication Reconciliation list.





DISCHARGE INSTRUCTIONS:


Patient was discharged home with home health services.  


Follow up with primary care provider in one week.





I have been assigned to dictate discharge summary for this account. 


I was not involved in the patient's management.











Chikis Martin NP Jun 5, 2019 10:05

## 2019-07-23 NOTE — CONSULTATION
DATE OF CONSULTATION:  07/23/2019

CARDIAC ELECTROPHYSIOLOGY CONSULTATION



CONSULTING PHYSICIAN:  Mario Verma M.D.



REFERRING PHYSICIAN:  Jose Alberto Garcia M.D.



ADDITIONAL REFERRING PHYSICIAN:  Dr. Davis



REASON FOR CONSULTATION:  Abnormal electrocardiogram in a patient with

hypertension, hyperlipidemia, history of right kidney mass status post

nephrectomy.



HISTORY OF PRESENT ILLNESS:  The patient is a very pleasant 74-year-old

 lady with history of hypertension, hyperlipidemia,

diabetes, COPD, asthma, history of diverticulitis, history of seizure two

years ago presented to emergency room with epigastric pain that started

this morning.  The patient had bowel movement, ______ had improvement in

abdominal discomfort.  The patient presented to the emergency room and was

noted to have T-inversion in inferolateral leads.  A Cardiology

consultation was obtained for further evaluation and management as the

patient's aspirin and nitroglycerin without any change in the pain.



REVIEW OF SYSTEMS:  Review of systems was negative other than what was

mentioned in the history of present illness.



PAST MEDICAL HISTORY:  As mentioned above.



FAMILY HISTORY:  Noncontributory.



SOCIAL HISTORY:  She lives at home.  She has an identical twin who is at

the bedside.  Does not smoke or drink alcohol.



ALLERGIES:  She is allergic to tramadol, morphine, and codeine.



PHYSICAL EXAMINATION:

VITAL SIGNS:  Blood pressure was 182/102, pulse 88, respirations 18, and

she is afebrile.

HEAD AND NECK:  No JVD or carotid bruits.

LUNGS:  Clear.

CARDIOVASCULAR:  Regular S1 and S2 with no gallop or murmur.

ABDOMEN:  Soft.

EXTREMITIES:  No pitting edema.



LABORATORY AND DIAGNOSTIC DATA:  Her EKG showed sinus rhythm with

inferolateral T-wave inversions suggestive of ischemia.  Her labs show

white count of 6.7, hemoglobin 13.7, hematocrit 41.6, and platelet count

of 268,000.  Sodium is 143, potassium is 3.8, BUN of 21, creatinine 1.3,

and glucose of 126.  Her first troponin is negative.  INR is 0.9.

Urinalysis was negative.



ASSESSMENT AND PLAN:

1. Accelerated hypertension.  Resume the patient's antihypertensive

medications.  At home, the patient is on Cardizem  mg daily that

will be resumed.  She is  also on hydrochlorothiazide 25 mg daily.

2. Abnormal electrocardiogram and epigastric pain.  First troponin is

negative completely ruled out MI protocol, get an echocardiogram.

3. Hyperlipidemia on Crestor 20 mg daily.

4. Epigastric pain.  The patient underwent abdominopelvic CT scan that

showed no definite acute abnormality.  Further evaluation by Dr. Couch.



Thank you very much for allowing me to participate in the care of this

patient.  Please do not hesitate to contact me if you have any questions

regarding my evaluation.



Sincerely,









  ______________________________________________

  Mario Verma M.D.





DR:  [place]

D:  07/23/2019 11:57

T:  07/23/2019 16:14

JOB#:  [id number]

CC:

## 2019-07-23 NOTE — CARDIAC ELECTROPHYSIOLOGY PN
Subjective


Subjective


[id number]





Objective





Last 24 Hour Vital Signs








  Date Time  Temp Pulse Resp B/P (MAP) Pulse Ox O2 Delivery O2 Flow Rate FiO2


 


7/23/19 10:38 97.7       


 


7/23/19 10:03 97.7 65 18 143/76 99 Room Air  


 


7/23/19 10:01 97.7 65 18 143/76 99 Room Air  


 


7/23/19 07:58 97.7 88 18 159/77 98 Room Air  


 


7/23/19 07:58  88 18   Room Air  


 


7/23/19 07:08 97.7 88 18 182/102 (128) 98 Room Air  











Laboratory Tests








Test


  7/23/19


07:55 7/23/19


08:48


 


White Blood Count


  6.2 K/UL


(4.8-10.8) 


 


 


Red Blood Count


  4.02 M/UL


(4.20-5.40)  L 


 


 


Hemoglobin


  13.6 G/DL


(12.0-16.0) 


 


 


Hematocrit


  41.6 %


(37.0-47.0) 


 


 


Mean Corpuscular Volume


  104 FL (80-99)


H 


 


 


Mean Corpuscular Hemoglobin


  33.8 PG


(27.0-31.0)  H 


 


 


Mean Corpuscular Hemoglobin


Concent 32.7 G/DL


(32.0-36.0) 


 


 


Red Cell Distribution Width


  11.2 %


(11.6-14.8)  L 


 


 


Platelet Count


  268 K/UL


(150-450) 


 


 


Mean Platelet Volume


  6.2 FL


(6.5-10.1)  L 


 


 


Neutrophils (%) (Auto)


  70.4 %


(45.0-75.0) 


 


 


Lymphocytes (%) (Auto)


  21.1 %


(20.0-45.0) 


 


 


Monocytes (%) (Auto)


  7.0 %


(1.0-10.0) 


 


 


Eosinophils (%) (Auto)


  0.8 %


(0.0-3.0) 


 


 


Basophils (%) (Auto)


  0.7 %


(0.0-2.0) 


 


 


Prothrombin Time


  9.5 SEC


(9.30-11.50) 


 


 


Prothromb Time International


Ratio 0.9 (0.9-1.1)  


  


 


 


Activated Partial


Thromboplast Time 24 SEC (23-33)


  


 


 


Sodium Level


  143 MMOL/L


(136-145) 


 


 


Potassium Level


  3.8 MMOL/L


(3.5-5.1) 


 


 


Chloride Level


  106 MMOL/L


() 


 


 


Carbon Dioxide Level


  31 MMOL/L


(21-32) 


 


 


Anion Gap


  6 mmol/L


(5-15) 


 


 


Blood Urea Nitrogen


  21 mg/dL


(7-18)  H 


 


 


Creatinine


  1.3 MG/DL


(0.55-1.30) 


 


 


Estimat Glomerular Filtration


Rate  mL/min (>60)  


  


 


 


Glucose Level


  126 MG/DL


()  H 


 


 


Calcium Level


  9.5 MG/DL


(8.5-10.1) 


 


 


Total Bilirubin


  0.6 MG/DL


(0.2-1.0) 


 


 


Aspartate Amino Transf


(AST/SGOT) 65 U/L (15-37)


H 


 


 


Alanine Aminotransferase


(ALT/SGPT) 42 U/L (12-78)


  


 


 


Alkaline Phosphatase


  123 U/L


()  H 


 


 


Troponin I


  0.001 ng/mL


(0.000-0.056) 


 


 


Total Protein


  7.4 G/DL


(6.4-8.2) 


 


 


Albumin


  4.0 G/DL


(3.4-5.0) 


 


 


Globulin 3.4 g/dL   


 


Albumin/Globulin Ratio 1.2 (1.0-2.7)   


 


Lipase


  104 U/L


() 


 


 


Urine Color  Yellow  


 


Urine Appearance  Clear  


 


Urine pH  5 (4.5-8.0)  


 


Urine Specific Gravity


  


  1.020


(1.005-1.035)


 


Urine Protein


  


  1+ (NEGATIVE)


H


 


Urine Glucose (UA)


  


  Negative


(NEGATIVE)


 


Urine Ketones


  


  Negative


(NEGATIVE)


 


Urine Blood


  


  Negative


(NEGATIVE)


 


Urine Nitrite


  


  Negative


(NEGATIVE)


 


Urine Bilirubin


  


  Negative


(NEGATIVE)


 


Urine Urobilinogen


  


  1 MG/DL


(0.0-1.0)  H


 


Urine Leukocyte Esterase


  


  2+ (NEGATIVE)


H


 


Urine RBC


  


  0-2 /HPF (0 -


2)


 


Urine WBC


  


  2-4 /HPF (0 -


2)


 


Urine Squamous Epithelial


Cells 


  Few /LPF


(NONE/OCC)


 


Urine Bacteria


  


  Few /HPF


(NONE)

















Mario Verma MD Jul 23, 2019 11:55

## 2019-07-23 NOTE — NUR
ED Nurse Note:pt. came from home with c/o epigastric pain, pt. is A/Ox4 
ambulatory, blood sent to labs, VSS, skin is intact, given IV and PO meds, 
placed on cardiac monitor

## 2019-07-23 NOTE — GENERAL PROGRESS NOTE
Assessment/Plan


Assessment/Plan:


Assessment


- Acute onset epigastric pain of unclear etiology (? viral , ? gastritis, ? 

constipation)


- symptoms not similar to LLQ pain, associated with diverticulitis in the past


- h/o Renal cell CA





Recommendations


- laxative


- PPI


- follow symptoms for now


- further w/u including EGD if symptoms persist





Thank you





Blanca Couch MD





Subjective


Allergies:  


Coded Allergies:  


     TRAMADOL (Verified  Allergy, Intermediate, 4/4/18)


 TREMORS AND VOMITING


     MORPHINE (Unverified  Allergy, Unknown, 7/23/19)


     CODEINE (Verified  Adverse Reaction, Mild, 1/25/13)


 nausea


Uncoded Allergies:  


     CODINE (Allergy, Unknown, 7/23/19)





Objective





Last 24 Hour Vital Signs








  Date Time  Temp Pulse Resp B/P (MAP) Pulse Ox O2 Delivery O2 Flow Rate FiO2


 


7/23/19 16:00  92      


 


7/23/19 15:59 98.1 91 18 166/91 (116) 100   


 


7/23/19 12:50  92  167/88    


 


7/23/19 12:00 97.3 92 18 167/88 (114) 100   


 


7/23/19 12:00  70      


 


7/23/19 10:48      Room Air  


 


7/23/19 10:42  91      


 


7/23/19 10:38 97.7       


 


7/23/19 10:03 97.7 65 18 143/76 99 Room Air  


 


7/23/19 10:01 97.7 65 18 143/76 99 Room Air  


 


7/23/19 07:58 97.7 88 18 159/77 98 Room Air  


 


7/23/19 07:58  88 18   Room Air  


 


7/23/19 07:08 97.7 88 18 182/102 (128) 98 Room Air  








Laboratory Tests


7/23/19 07:55: 


White Blood Count 6.2, Red Blood Count 4.02L, Hemoglobin 13.6, Hematocrit 41.6, 

Mean Corpuscular Volume 104H, Mean Corpuscular Hemoglobin 33.8H, Mean 

Corpuscular Hemoglobin Concent 32.7, Red Cell Distribution Width 11.2L, 

Platelet Count 268, Mean Platelet Volume 6.2L, Neutrophils (%) (Auto) 70.4, 

Lymphocytes (%) (Auto) 21.1, Monocytes (%) (Auto) 7.0, Eosinophils (%) (Auto) 

0.8, Basophils (%) (Auto) 0.7, Prothrombin Time 9.5, Prothromb Time 

International Ratio 0.9, Activated Partial Thromboplast Time 24, Sodium Level 

143, Potassium Level 3.8, Chloride Level 106, Carbon Dioxide Level 31, Anion 

Gap 6, Blood Urea Nitrogen 21H, Creatinine 1.3, Estimat Glomerular Filtration 

Rate , Glucose Level 126H, Calcium Level 9.5, Total Bilirubin 0.6, Aspartate 

Amino Transf (AST/SGOT) 65H, Alanine Aminotransferase (ALT/SGPT) 42, Alkaline 

Phosphatase 123H, Troponin I 0.001, Total Protein 7.4, Albumin 4.0, Globulin 3.4

, Albumin/Globulin Ratio 1.2, Lipase 104


7/23/19 08:48: 


Urine Color Yellow, Urine Appearance Clear, Urine pH 5, Urine Specific Gravity 

1.020, Urine Protein 1+H, Urine Glucose (UA) Negative, Urine Ketones Negative, 

Urine Blood Negative, Urine Nitrite Negative, Urine Bilirubin Negative, Urine 

Urobilinogen 1H, Urine Leukocyte Esterase 2+H, Urine RBC 0-2, Urine WBC 2-4, 

Urine Squamous Epithelial Cells Few, Urine Bacteria Few


7/23/19 15:30: Troponin I 0.004


Height (Feet):  5


Height (Inches):  4.00


Weight (Pounds):  190











Blanca Couch MD Jul 23, 2019 21:01

## 2019-07-23 NOTE — NUR
ED Nurse Note:pt. c/o bari hein MD notified, tryed to call report to tele- told 
to call back in 10 min

## 2019-07-23 NOTE — CONSULTATION
DATE OF CONSULTATION:  07/23/2019

INFECTIOUS DISEASE CONSULTATION



CONSULTING PHYSICIAN:  Leonardo Jacob M.D.



PRIMARY ATTENDING PHYSICIAN:  Jose Alberto Garcia M.D., Dr. Harvey Deluca covering

for him.



REASON FOR CONSULT:  Gastroenteritis.



HISTORY OF PRESENT ILLNESS:  This is a 74-year-old  female

admitted today from home complaining of epigastric pain and an episode of

nausea and vomiting.  Pain is pressure tight in epigastric area with

radiation to back.  Initially, it was 10/10, now decreased to 6/10.  She

had one episode of vomiting.  No fever.  No chills.  The patient was

recently hospitalized in [facility] between May 28th and June 4th with acute diverticulitis.  At this time, the patient does not have

the lower abdominal pain as previous admission.



PAST MEDICAL HISTORY:  Right renal cancer status post right nephrectomy,

hypertension, diabetes mellitus type 2, COPD, seizure disorder, and

asthma.



ALLERGIES:  To codeine, morphine, and tramadol.



CURRENT MEDICATIONS:  Getting ceftriaxone, aspirin, heparin, dicyclomine,

gabapentin, oral neomycin, clonidine, Tylenol, Cardizem, diazepam, and

hydrochlorothiazide.



SOCIAL HISTORY:  Single, lives at home.  Denies alcohol, drug abuse, or

smoking.



REVIEW OF SYSTEMS:  No fever.  No chills.  Has decrease in appetite,

epigastric pain.  Nausea and vomiting.  No significant coughing.  No

problem passing urine.



PHYSICAL EXAMINATION:

VITAL SIGNS:  Temperature 98.1, pulse 91, and blood pressure

166/91.

GENERAL APPEARANCE:  No acute distress.  Seems to be well

developed.

HEAD AND NECK:  Pink conjunctivae.

HEART:  Normal rate.

ABDOMEN:  Obese, soft.  Tender in the epigastric area.

EXTREMITIES:  No edema.

NEUROLOGIC:  Awake, alert, and oriented x3.



LABORATORY AND DIAGNOSTIC DATA:  WBC 6.2, hemoglobin 13.6, hematocrit 41.6,

and platelets 268.  Sodium 143, potassium 3.8, chloride 106, bicarbonate

31, BUN 21, and creatinine 1.3.  Alkaline phosphatase 123.  First troponin

was negative.  Lipase within  normal limits.  UA has wbc's of 2 to 4,

leukocyte esterase 2+.



IMPRESSION:  Epigastric pain, nausea, and vomiting, likely gastroenteritis.

The patient has diabetes mellitus type 2, hypertension, COPD, history of

renal cell cancer, and right nephrectomy.



RECOMMENDATION:  Continue Rocephin for now.  If the culture remains

negative, we will stop antibiotics soon.



At the end of my exam, I thank Dr. Harvey Deluca for involving me in the

care of this patient.









  ______________________________________________

  Leonardo Jacob M.D.





DR:  ISABELL

D:  07/23/2019 16:08

T:  07/23/2019 20:49

JOB#:  [id number]

CC:

## 2019-07-23 NOTE — HISTORY AND PHYSICAL REPORT
DATE OF ADMISSION:  07/23/2019

CONSULTANTS:

1. Dr. Davis.

2. Julián Ely M.D.

3. Leonardo Jacob M.D.

4. Blanca Couch M.D.



CHIEF COMPLAINT:  Abdominal pain, nausea, vomiting, and diverticulitis.



BRIEF HISTORY:  This is a 74-year-old female, who lives at home, presented

with increased abdominal pain and slight nausea and vomiting, no diarrhea,

x2 days intermittent and sharp.  The patient came to [facility], diagnosed

with diverticulitis, and now admitted to telemetry for further care.

Currently, slightly anxious in bed, 6/10 abdominal pain, no complaint.



PAST MEDICAL HISTORY:  Diverticulitis, kidney cancer, and hypertension.



PAST SURGERY HISTORY:  Right kidney surgery.



MEDICATIONS:  Include ceftriaxone, aspirin, heparin, dicyclomine,

gabapentin, clonidine, Tylenol, Zofran, diltiazem, and

hydrochlorothiazide.



ALLERGIES:  Codeine, morphine, and tramadol.



SOCIAL HISTORY:  No smoking.  No alcohol.  No intravenous drug use.



FAMILY HISTORY:  Noncontributory.



REVIEW OF SYSTEMS:  No chest pain.  No shortness of breath.  Slight nausea.

No vomiting.  No diarrhea.



PHYSICAL EXAMINATION:

GENERAL:  Calm in bed, oriented x3, slight distress secondary to pain.

VITAL SIGNS:  Temp is 98 degrees, pulse 91, respirations 18, and blood

pressure 166/91.

CARDIOVASCULAR:  No murmurs.

LUNGS:  Distant and clear.

ABDOMEN:  Bowel sounds positive.  Slightly tender.  No guarding.  No

rigidity.  No rebound.

EXTREMITIES:  No cyanosis, clubbing, or edema.

NEUROLOGIC:  The patient moves all extremities, slightly weak.



LABORATORY AND DIAGNOSTIC DATA:  Labs, at this time, show CBC is normal.

BMP show BUN 21 and glucose 126.  AST 65.  Troponin 0.001.  Alkaline

phosphatase 123.  INR 0.9.  PTT 24.  Urinalysis show 2+ leukocyte

esterase.



ASSESSMENT:

1. Abdominal pain.

2. Diverticulitis.

3. UTI.

4. Nausea.

5. History of kidney cancer.

6. Hypertension.



PLAN:

1. Blood pressure control.

2. Pain control.

3. Dietary followup.

4. Antibiotics per Infectious Disease.

5. Check labs in the a.m.









  ______________________________________________

  Harvey Deluca D.O.





DR:  ANDRY

D:  07/23/2019 16:17

T:  07/23/2019 22:04

JOB#:  [id number]

CC:

## 2019-07-23 NOTE — NUR
CASE MANAGEMENT:REVIEW



74 YR OLD FEMALE BIBA 



CC; ABDOMINAL/EPIGASTRIC PAIN PAIN



PMH: COLITIS AND DIVERTICULITIS



SI: DIVERTICULITIS

97.7   88  18   182/102  98% ON RA

GLUCOSE+126



IS: 500CC NS BOLUS

IV REGLAN 

IV PEPCID

CT ABD/PELVIS

URINE REFLEX

**: TELEMETRY STATUS

## 2019-07-23 NOTE — NUR
NURSE NOTES:

Received report from SIMON Reddy. Pt asleep. Bed in lowest position. Call light within 
reach. Will continue to monitor.

## 2019-07-23 NOTE — DIAGNOSTIC IMAGING REPORT
Clinical Indication: Abdominal pain, epigastric pain

 

Technique:   No oral contrast utilized, per emergency room physician request  IV

administration nonionic contrast. Venous phase spiral acquisition obtained through

the abdomen and pelvis. Multiplanar reconstructions were generated. Total dose length

product 954.5 mGycm. CTDIvol(s) 19.07 mGy. Dose reduction achieved using automated

exposure control

 

 

Comparison: 5/24/2017 contrast CT, 5/28/2019 noncontrast CT

 

Findings: Lack of enteric contrast limits assessment of the GI tract. Again

demonstrated is extensive colonic diverticulosis. No evidence of diverticulitis. The

appendix is not definitely visualized, but no findings to suggest acute appendicitis

are evident. There is a small ventral hernia which contains only fat again

demonstrated. No small bowel distention. No free or loculated intraperitoneal gas or

fluid is evident. Distal esophagus, stomach, duodenum are unremarkable.

 

The liver demonstrates a subcentimeter low-attenuation lesion in segment 8, also

previously demonstrated. The gallbladder, bile ducts are all unremarkable. The

pancreas is somewhat fatty replaced. The spleen and adrenals are unremarkable. No

pelvic mass or adenopathy. Uterus and adnexal structures appear unremarkable. The

bladder is nondistended.

 

The kidneys demonstrate bilateral cysts and subcentimeter low-attenuation lesions

which are too small to characterize. Previously demonstrated (on the 2017 exam) right

interpolar region partially enhancing mass is no longer evident, was also not evident

on the May 2019 exam. There is a 8 mm diameter lesion coming off of the lower pole of

the left kidney which demonstrates a suggestion of higher than fluid attenuation.

This is evident on the previous 5/28/2018 exam, is present but less apparent on

5/24/2017, and not visible on earlier studies.

 

The included lung bases are clear. The bones are unremarkable

 

Impression: Limited assessment of the GI tract, due to lack of enteric contrast

administration

 

No definite acute abnormality

 

8 mm left lower pole renal lesion, not clearly cystic, also previously reported.

Recommend sonography for further evaluation if this has not yet already been worked

up

 

Note continued absence of the right renal lesion described on 5/24/2017. Correlate

with any history of interim therapy

 

Other findings as noted, including fat-containing ventral hernia, probable right lobe

liver cyst, renal cysts

 

The CT scanner at [facility] is accredited by the [employer] College of

Radiology and the scans are performed using protocols designed to limit radiation

exposure to as low as reasonably achievable to attain images of sufficient resolution

adequate for diagnostic evaluation.

## 2019-07-23 NOTE — EMERGENCY ROOM REPORT
History of Present Illness


General


Chief Complaint:  Abdominal Pain


Source:  Patient, EMS





Present Illness


HPI


Patient is a 74-year-old female presented after increased epigastric pain.  

Patient had recent onset earlier this morning.  She reports having a bowel 

movement but did not have any improvement in her abdominal discomfort.  She 

reports having primarily epigastric pain which somewhat radiates to her back.  

She denies any similar episodes of pain in the past although she has had prior 

history of renal cancer as well as diverticulitis.  Patient states that she had 

a normal bowel movement this morning.  She denies any fever.  She had 

reportedly been urinating normally.  She denies any current chest pain.  

Patient was brought in by EMS and EKG was performed by them which showed normal 

sinus rhythm with diffuse T wave inversion.  Patient had been given aspirin as 

well as nitroglycerin without any change in the pain.She states that she is 

currently taking multiple medications is followed by Dr. Garcia as well as Dr. Couch for GI.


Allergies:  


Coded Allergies:  


     TRAMADOL (Verified  Allergy, Intermediate, 4/4/18)


 TREMORS AND VOMITING


     MORPHINE (Unverified  Allergy, Unknown, 7/23/19)


     CODEINE (Verified  Adverse Reaction, Mild, 1/25/13)


 nausea


Uncoded Allergies:  


     CODINE (Allergy, Unknown, 7/23/19)





Patient History


Past Medical History:  see triage record


Pregnant Now:  No


Reviewed Nursing Documentation:  PMH: Agreed; PSxH: Agreed





Nursing Documentation-PMH


Hx Cardiac Problems:  Yes - neuropathy


Hx Hypertension:  Yes


Hx Pacemaker:  No


Hx Asthma:  Yes


Hx COPD:  Yes


Hx Diabetes:  Yes - controlled


Hx Cancer:  Yes


Hx Gastrointestinal Problems:  Yes - diverticulitis, ulcerative colitis


Hx Neurological Problems:  No


Hx Cerebrovascular Accident:  No


Hx Seizures:  Yes - 2 years ago





Review of Systems


All Other Systems:  negative except mentioned in HPI





Physical Exam





Vital Signs








  Date Time  Temp Pulse Resp B/P (MAP) Pulse Ox O2 Delivery O2 Flow Rate FiO2


 


7/23/19 07:08 97.7 88 18 182/102 (128) 98 Room Air  








Sp02 EP Interpretation:  reviewed, normal


General Appearance:  normal inspection, well appearing, no apparent distress, 

alert, GCS 15, obese, Chronically Ill


Head:  atraumatic


ENT:  normal ENT inspection, hearing grossly normal, normal voice


Neck:  normal inspection, full range of motion, supple, no bony tend


Respiratory:  normal inspection, lungs clear, normal breath sounds, no 

respiratory distress, no retraction, no wheezing


Cardiovascular #1:  regular rate, rhythm, no edema


Gastrointestinal:  normal inspection, normal bowel sounds, non tender, soft, no 

guarding, no hernia, distended, tenderness - mild diffuse


Genitourinary:  no CVA tenderness


Musculoskeletal:  normal inspection, back normal, normal range of motion


Neurologic:  normal inspection, alert, oriented x3, responsive, CNs III-XII nml 

as tested, speech normal


Psychiatric:  normal inspection, judgement/insight normal, mood/affect normal





Medical Decision Making


Diagnostic Impression:  


 Primary Impression:  


 Abdominal pain


 Additional Impression:  


 Diverticulitis


ER Course


Patient presented for abdominal pain. Differential diagnoses included ischemic 

bowel, appendicitis, perforated viscus, abdominal aortic aneurysm, inferior 

myocardial infarction, viral gastroenteritis among others.  Because of 

complexity of patient's case laboratory testing and imaging studies were 

ordered.  Patient was noted to have prior history of colitis as well as 

diverticulitis in the past.  Patient's EKG showed normal sinus rhythm with 

multiple areas of T wave inversion.Her laboratory testing was unremarkable.  

She was noted to have the imaging which showed changes as per radiology report.

  Because of patient's recent history of diverticular disease and recent onset 

of symptoms.  Patient will be admitted for further evaluation and treatment.  

Dr. Thomas Davis was contacted for inpatient management due to covering 

physician for Dr. Garcia.





Labs








Test


  7/23/19


07:55 7/23/19


08:48


 


White Blood Count


  6.2 K/UL


(4.8-10.8) 


 


 


Red Blood Count


  4.02 M/UL


(4.20-5.40) 


 


 


Hemoglobin


  13.6 G/DL


(12.0-16.0) 


 


 


Hematocrit


  41.6 %


(37.0-47.0) 


 


 


Mean Corpuscular Volume 104 FL (80-99)  


 


Mean Corpuscular Hemoglobin


  33.8 PG


(27.0-31.0) 


 


 


Mean Corpuscular Hemoglobin


Concent 32.7 G/DL


(32.0-36.0) 


 


 


Red Cell Distribution Width


  11.2 %


(11.6-14.8) 


 


 


Platelet Count


  268 K/UL


(150-450) 


 


 


Mean Platelet Volume


  6.2 FL


(6.5-10.1) 


 


 


Neutrophils (%) (Auto)


  70.4 %


(45.0-75.0) 


 


 


Lymphocytes (%) (Auto)


  21.1 %


(20.0-45.0) 


 


 


Monocytes (%) (Auto)


  7.0 %


(1.0-10.0) 


 


 


Eosinophils (%) (Auto)


  0.8 %


(0.0-3.0) 


 


 


Basophils (%) (Auto)


  0.7 %


(0.0-2.0) 


 


 


Prothrombin Time


  9.5 SEC


(9.30-11.50) 


 


 


Prothromb Time International


Ratio 0.9 (0.9-1.1) 


  


 


 


Activated Partial


Thromboplast Time 24 SEC (23-33) 


  


 


 


Sodium Level


  143 MMOL/L


(136-145) 


 


 


Potassium Level


  3.8 MMOL/L


(3.5-5.1) 


 


 


Chloride Level


  106 MMOL/L


() 


 


 


Carbon Dioxide Level


  31 MMOL/L


(21-32) 


 


 


Anion Gap


  6 mmol/L


(5-15) 


 


 


Blood Urea Nitrogen


  21 mg/dL


(7-18) 


 


 


Creatinine


  1.3 MG/DL


(0.55-1.30) 


 


 


Estimat Glomerular Filtration


Rate  mL/min (>60) 


  


 


 


Glucose Level


  126 MG/DL


() 


 


 


Calcium Level


  9.5 MG/DL


(8.5-10.1) 


 


 


Total Bilirubin


  0.6 MG/DL


(0.2-1.0) 


 


 


Aspartate Amino Transf


(AST/SGOT) 65 U/L (15-37) 


  


 


 


Alanine Aminotransferase


(ALT/SGPT) 42 U/L (12-78) 


  


 


 


Alkaline Phosphatase


  123 U/L


() 


 


 


Troponin I


  0.001 ng/mL


(0.000-0.056) 


 


 


Total Protein


  7.4 G/DL


(6.4-8.2) 


 


 


Albumin


  4.0 G/DL


(3.4-5.0) 


 


 


Globulin 3.4 g/dL  


 


Albumin/Globulin Ratio 1.2 (1.0-2.7)  


 


Lipase


  104 U/L


() 


 


 


Urine Color  Yellow 


 


Urine Appearance  Clear 


 


Urine pH  5 (4.5-8.0) 


 


Urine Specific Gravity


  


  1.020


(1.005-1.035)


 


Urine Protein  1+ (NEGATIVE) 


 


Urine Glucose (UA)


  


  Negative


(NEGATIVE)


 


Urine Ketones


  


  Negative


(NEGATIVE)


 


Urine Blood


  


  Negative


(NEGATIVE)


 


Urine Nitrite


  


  Negative


(NEGATIVE)


 


Urine Bilirubin


  


  Negative


(NEGATIVE)


 


Urine Urobilinogen


  


  1 MG/DL


(0.0-1.0)


 


Urine Leukocyte Esterase  2+ (NEGATIVE) 


 


Urine RBC


  


  0-2 /HPF (0 -


2)


 


Urine WBC


  


  2-4 /HPF (0 -


2)


 


Urine Squamous Epithelial


Cells 


  Few /LPF


(NONE/OCC)


 


Urine Bacteria


  


  Few /HPF


(NONE)








EKG Diagnostic Results


Rate:  normal


Rhythm:  NSR


ST Segments:  other - t wave inversion





Last Vital Signs








  Date Time  Temp Pulse Resp B/P (MAP) Pulse Ox O2 Delivery O2 Flow Rate FiO2


 


7/23/19 07:08 97.7 88 18 182/102 (128) 98 Room Air  








Status:  improved


Disposition:  ADMITTED AS INPATIENT


Condition:  Serious











Jax Alcaraz MD Jul 23, 2019 07:26

## 2019-07-23 NOTE — CARDIOLOGY REPORT
--------------- APPROVED REPORT --------------





EXAM: Two-dimensional and M-mode echocardiogram with Doppler and color Doppler.



INDICATION

Chest Pain 



M-Mode DIMENSIONS 

IVSd0.9 (0.7-1.1cm)Left Atrium (MM)3.1 (1.6-4.0cm)

LVDd4.8 (3.5-5.6cm)Aortic Root2.6 (2.0-3.7cm)

PWd1.0 (0.7-1.1cm)Aortic Cusp Exc.1.7 (1.5-2.0cm)



LVDs3.0 (2.5-4.0cm)

PWs1.3 cm





Low normal left ventricular systolic function and wall motion.

Mildly enlarged left ventricle by 2-D.

Left ventricular ejection fraction estimated to be 50 %.

No left ventricular hypertrophy.

Anterior Echo-free space, may be due to pericardial fat or effusion. 

Left atrial size at upper limits of normal.

Right cardiac chamber sizes are within normal limits. 

Focal aortic valve sclerosis with adequate cusp excursion.

Thickened mitral valve leaflets with normal excursion.

Mitral annulus and aortic root calcification.

Pulmonic valve not well visualized.

Normal tricuspid valve structure. 

IVC measured at 2.0 cm with slight physiologic collapse.



A  color flow and spectral Doppler study was performed and revealed:

No aortic insufficiency.

Trace mitral regurgitation.

Mitral diastolic velocities suggest reduced left ventricular relaxation c/w impaired 

relaxation grade one diastolic dysfunction.

Trace tricuspid regurgitation.

Tricuspid  systolic velocities suggests peak right ventricular systolic pressure of 23 

mmHg.

No pulmonic regurgitation present.

## 2019-07-23 NOTE — NUR
NURSE NOTES: Pt transferred safely to floor from ED. Cardiac monitor applied and pt is SR. 
Belongings list verified. Report received from SIMON Lemus. Pt shows no signs of distress. 
A+Ox4, denies pain/ SOB at this time. Respirations even and unlabored on room air. IV site 
is patent and intact. Bed is at lowest position, brakes engaged, siderails x2, bed alarm on, 
and call light within reach. Pt is in stable condition at this time; will continue to 
monitor.

## 2019-07-23 NOTE — NUR
NURSE NOTES: Pt is having her epigastric pain again. She is requesting tylenol and zofran. 
Left message with Dr. Davis; awaiting response. 

-------------------------------------------------------------------------------

Addendum: 07/23/19 at 1545 by YENNIFER HAMILTON RN

-------------------------------------------------------------------------------

Dr. Davis ordered both tylenol and zofran

## 2019-07-23 NOTE — CARDIOLOGY REPORT
--------------- APPROVED REPORT --------------





EKG Measurement

Heart [id number]

WV [id number]

[id number]

[id number]

[id number]





Normal sinus rhythm

T wave abnormality, consider anterolateral ischemia

Abnormal ECG

## 2019-07-24 NOTE — CARDIOLOGY REPORT
--------------- APPROVED REPORT --------------





EKG Measurement

Heart [id number]

MO [id number]

[id number]

[id number]

[id number]





Normal sinus rhythm

Septal infarct, age undetermined

T wave abnormality, consider inferior ischemia

T wave abnormality, consider anterolateral ischemia

Abnormal ECG

## 2019-07-24 NOTE — CARDIAC ELECTROPHYSIOLOGY PN
Assessment/Plan


Assessment/Plan


1. Accelerated hypertension.  On Cardizem  mg daily and 

hydrochlorothiazide 25 mg daily.


2. Abnormal electrocardiogram and epigastric pain. Ruled out for MI . Ef 50%.


3. Hyperlipidemia on Crestor 20 mg daily.


4. Epigastric pain.  The patient underwent abdominopelvic CT scan that


showed no definite acute abnormality.  Further evaluation by Dr. Couch.


5. Renal cell carcinoma, status post nephrectomy that was completed 

approximately 1 month ago FU Dr Blanco.





D WRN





Subjective


Subjective


Remained in SR. No CP or SOB.





Objective





Last 24 Hour Vital Signs








  Date Time  Temp Pulse Resp B/P (MAP) Pulse Ox O2 Delivery O2 Flow Rate FiO2


 


7/24/19 12:00 98.5 70 21 146/69 (94) 98   


 


7/24/19 12:00  60      


 


7/24/19 09:00      Room Air  


 


7/24/19 08:40  67  121/63    


 


7/24/19 08:00  60      


 


7/24/19 08:00 98.7 67 20 121/63 (82) 98   


 


7/24/19 04:00 98.0 71 18 133/72 (92) 100   


 


7/24/19 03:47  67      


 


7/24/19 00:00  66      


 


7/24/19 00:00 98.2 77 18 149/80 (103) 95   


 


7/23/19 21:00      Room Air  


 


7/23/19 20:00 98.1 75 18 154/83 (106) 96   


 


7/23/19 20:00  79      


 


7/23/19 16:00  92      


 


7/23/19 15:59 98.1 91 18 166/91 (116) 100   


 


7/23/19 12:50  92  167/88    

















Intake and Output  


 


 7/23/19 7/24/19





 19:00 07:00


 


Intake Total 390 ml 


 


Output Total 1000 ml 


 


Balance -610 ml 


 


  


 


Intake Oral 390 ml 


 


Output Urine Total 1000 ml 


 


# Voids 1 3











Laboratory Tests








Test


  7/23/19


15:30 7/23/19


23:10 7/24/19


06:40


 


Troponin I


  0.004 ng/mL


(0.000-0.056) 0.000 ng/mL


(0.000-0.056) 0.000 ng/mL


(0.000-0.056)


 


White Blood Count


  


  


  4.0 K/UL


(4.8-10.8)  L


 


Red Blood Count


  


  


  3.78 M/UL


(4.20-5.40)  L


 


Hemoglobin


  


  


  12.9 G/DL


(12.0-16.0)


 


Hematocrit


  


  


  39.4 %


(37.0-47.0)


 


Mean Corpuscular Volume


  


  


  104 FL (80-99)


H


 


Mean Corpuscular Hemoglobin


  


  


  34.2 PG


(27.0-31.0)  H


 


Mean Corpuscular Hemoglobin


Concent 


  


  32.8 G/DL


(32.0-36.0)


 


Red Cell Distribution Width


  


  


  11.4 %


(11.6-14.8)  L


 


Platelet Count


  


  


  229 K/UL


(150-450)


 


Mean Platelet Volume


  


  


  6.1 FL


(6.5-10.1)  L


 


Neutrophils (%) (Auto)


  


  


  54.0 %


(45.0-75.0)


 


Lymphocytes (%) (Auto)


  


  


  34.7 %


(20.0-45.0)


 


Monocytes (%) (Auto)


  


  


  8.3 %


(1.0-10.0)


 


Eosinophils (%) (Auto)


  


  


  1.2 %


(0.0-3.0)


 


Basophils (%) (Auto)


  


  


  1.7 %


(0.0-2.0)


 


Sodium Level


  


  


  143 MMOL/L


(136-145)


 


Potassium Level


  


  


  3.9 MMOL/L


(3.5-5.1)


 


Chloride Level


  


  


  107 MMOL/L


()


 


Carbon Dioxide Level


  


  


  29 MMOL/L


(21-32)


 


Anion Gap


  


  


  7 mmol/L


(5-15)


 


Blood Urea Nitrogen


  


  


  12 mg/dL


(7-18)


 


Creatinine


  


  


  1.2 MG/DL


(0.55-1.30)


 


Estimat Glomerular Filtration


Rate 


  


   mL/min (>60)  


 


 


Glucose Level


  


  


  114 MG/DL


()  H


 


Calcium Level


  


  


  9.4 MG/DL


(8.5-10.1)








Objective





HEAD AND NECK:  No JVD or carotid bruits.


LUNGS:  Clear.


CARDIOVASCULAR:  Regular S1 and S2 with no gallop or murmur.


ABDOMEN:  Soft.


EXTREMITIES:  No pitting edema.











Mario Verma MD Jul 24, 2019 12:50

## 2019-07-24 NOTE — CONSULTATION
DATE OF CONSULTATION:  07/23/2019

GASTROLOGY CONSULTATION REPORT



CHIEF COMPLAINT:  I was asked to see this patient by Dr. Jose Alberto Garcia and Dr. Harvey Deluca for evaluation of some abdominal pain.



HISTORY OF PRESENT ILLNESS:  The patient is a pleasant 74-year-old 

American woman who was in her usual state of health until she woke up this

morning with sudden stronger epigastric abdominal pain radiating to her

back.  She had some episodes of nausea and vomiting.  She had some bowel

movements today.  She states she says this pain does not resemble her

typical pains of diverticulitis there, which are left lower quadrant in

nature.  In addition, her CT scan did not show any evidence of

diverticulitis.  She feels better now, although she is afraid of eating.

She is not having diarrhea.  The patient has had extensive GI workup for

her chronic abdominal pains with negative results.



PAST MEDICAL HISTORY:  History of renal cell carcinoma, status post partial

right nephrectomy, status post endoscopy normal in 2013, and last

colonoscopy showed diverticulosis more on the left side at that time.  A

second colonoscopy was done 2016, showing only diverticulosis and a polyp,

which was inflammatory and was removed, history of hypertension, obesity,

elevated cholesterol, chronic constipation, and reactive airway disease.



FAMILY HISTORY:  Positive for questionable history of Crohn's disease in

the father.



SOCIAL HISTORY:  The patient is single.  She does not smoke or drink

alcohol.  She has a twin sister, who was very involved in her daily

life.



MEDICATIONS:  See chart list for details.



ALLERGIES:  Codeine and tramadol.



REVIEW OF SYSTEMS:  Otherwise negative.



PHYSICAL EXAMINATION:

GENERAL:  A very pleasant  woman, seen in her room.

HEENT:  Normocephalic and atraumatic.  Sclerae anicteric.  Oropharynx

clear.

NECK:  Supple.

CHEST:  Clear to auscultation.

CARDIOVASCULAR:  Revealed regular rate.

ABDOMEN:  Soft with some mild epigastric and left upper quadrant abdominal

discomfort without guarding or rebound.  No masses.

EXTREMITIES:  Revealed no edema.



LABORATORY DATA:  CT scan was noted.



ASSESSMENT:  This patient presents with sudden onset of epigastric and left

upper quadrant abdominal pain today when arising from bed of unclear

etiology.  Differential diagnosis would include simple causative factors

such as viral gastroenteritis or mild gastritis.  Another factor

involves colonic spasm in the area.  Other diagnosis can be considered,

although the patient for instance does not have any radiographic evidence

of diverticulitis.  Should her symptoms persist, then further workup may

be necessary.



RECOMMENDATIONS:

1. Continue proton pump inhibitor.

2. Laxative trial.

3. Observe the patient for now.

4. Further workup if symptoms persist.



Thank you for asking me to participate in the care of this patient.









  ______________________________________________

  Blanca Couch M.D.





DR:  JAYLENE

D:  07/23/2019 21:08

T:  07/24/2019 02:24

JOB#:  [id number]

CC:



MELANY

## 2019-07-24 NOTE — NUR
CASE MANAGEMENT:REVIEW



7/24/19

SI: DIVERTICULITIS. ACCELERATED HTN

UTI. ABD PAIN. H/O RENAL CELL CARCINOMA

98.5  70  21  146/69  98% ON RA

GLUCOSE+114



IS: IV ROCEPHIN Q24

ASA PO QD

HEPARIN SQ Q12

NORCO PO Q8HRS PRN

NEURONTIN PO BID

CARDIZEM PO QD

HCTZ PO QD

**: TELEMETRY STATUS

DCP: FROM HOME

## 2019-07-24 NOTE — NUR
NURSE NOTES: Nurse report given by SIMON Reid. Patient's awake and laying in bed. Breakfast at 
bedside. Denies pain, no s/s of acute distress or SOB. Bed at lowest position, break engaged 
and call light within reach. IV fluid is running NS, no sign of infiltration or tenderness. 
Will continue to monitor.

## 2019-07-24 NOTE — GENERAL PROGRESS NOTE
Assessment/Plan


Assessment/Plan:


Assessment


- Acute onset epigastric pain of unclear etiology (? viral , ? gastritis, ? 

constipation)


- symptoms not similar to LLQ pain, associated with diverticulitis in the past


- h/o Renal cell CA





Recommendations


- PRN laxative


- PPI


- follow symptoms for now


- further w/u including EGD if symptoms persist





Subjective


Allergies:  


Coded Allergies:  


     TRAMADOL (Verified  Allergy, Intermediate, 4/4/18)


 TREMORS AND VOMITING


     MORPHINE (Unverified  Allergy, Unknown, 7/23/19)


     CODEINE (Verified  Adverse Reaction, Mild, 1/25/13)


 nausea


Uncoded Allergies:  


     CODINE (Allergy, Unknown, 7/23/19)


Subjective


Feels better


less abd pain


d/w patient re findings to date





Objective





Last 24 Hour Vital Signs








  Date Time  Temp Pulse Resp B/P (MAP) Pulse Ox O2 Delivery O2 Flow Rate FiO2


 


7/24/19 04:00 98.0 71 18 133/72 (92) 100   


 


7/24/19 03:47  67      


 


7/24/19 00:00  66      


 


7/24/19 00:00 98.2 77 18 149/80 (103) 95   


 


7/23/19 21:00      Room Air  


 


7/23/19 20:00 98.1 75 18 154/83 (106) 96   


 


7/23/19 20:00  79      


 


7/23/19 16:00  92      


 


7/23/19 15:59 98.1 91 18 166/91 (116) 100   


 


7/23/19 12:50  92  167/88    


 


7/23/19 12:00 97.3 92 18 167/88 (114) 100   


 


7/23/19 12:00  70      


 


7/23/19 10:48      Room Air  


 


7/23/19 10:42  91      


 


7/23/19 10:38 97.7       


 


7/23/19 10:03 97.7 65 18 143/76 99 Room Air  


 


7/23/19 10:01 97.7 65 18 143/76 99 Room Air  

















Intake and Output  


 


 7/23/19 7/24/19





 19:00 07:00


 


Intake Total 390 ml 


 


Output Total 1000 ml 


 


Balance -610 ml 


 


  


 


Intake Oral 390 ml 


 


Output Urine Total 1000 ml 


 


# Voids 1 3








Laboratory Tests


7/23/19 08:48: 


Urine Color Yellow, Urine Appearance Clear, Urine pH 5, Urine Specific Gravity 

1.020, Urine Protein 1+H, Urine Glucose (UA) Negative, Urine Ketones Negative, 

Urine Blood Negative, Urine Nitrite Negative, Urine Bilirubin Negative, Urine 

Urobilinogen 1H, Urine Leukocyte Esterase 2+H, Urine RBC 0-2, Urine WBC 2-4, 

Urine Squamous Epithelial Cells Few, Urine Bacteria Few


7/23/19 15:30: Troponin I 0.004


7/23/19 23:10: Troponin I 0.000


7/24/19 06:40: 


Troponin I 0.000, White Blood Count 4.0L, Red Blood Count 3.78L, Hemoglobin 12.9

, Hematocrit 39.4, Mean Corpuscular Volume 104H, Mean Corpuscular Hemoglobin 

34.2H, Mean Corpuscular Hemoglobin Concent 32.8, Red Cell Distribution Width 

11.4L, Platelet Count 229, Mean Platelet Volume 6.1L, Neutrophils (%) (Auto) 

54.0, Lymphocytes (%) (Auto) 34.7, Monocytes (%) (Auto) 8.3, Eosinophils (%) (

Auto) 1.2, Basophils (%) (Auto) 1.7, Sodium Level 143, Potassium Level 3.9, 

Chloride Level 107, Carbon Dioxide Level 29, Anion Gap 7, Blood Urea Nitrogen 12

, Creatinine 1.2, Estimat Glomerular Filtration Rate , Glucose Level 114H, 

Calcium Level 9.4


Height (Feet):  5


Height (Inches):  4.00


Weight (Pounds):  189


Objective


WD obese AA woman


NCAT


supple


CTA


RR


abd soft, Mild Epigastric and RUQ TTP


no edema


non focal











Blanca Couch MD Jul 24, 2019 08:31

## 2019-07-24 NOTE — INFECTIOUS DISEASES PROG NOTE
Assessment/Plan


Assessment/Plan


IMPRESSION: 


nausea, and vomiting, likely gastroenteritis.


Diabetes mellitus type 2, 


hypertension, COPD,


history of renal cell cancer, and right nephrectomy.





RECOMMENDATION:  


Discontinue Rocephin


Observe off antibiotic





Subjective


ROS Limited/Unobtainable:  No


Constitutional:  Reports: no symptoms


Respiratory:  Reports: no symptoms


Cardiovascular:  Reports: no symptoms


Gastrointestinal/Abdominal:  Reports: other - abdominal pain ; Denies: diarrhea


Allergies:  


Coded Allergies:  


     TRAMADOL (Verified  Allergy, Intermediate, 4/4/18)


 TREMORS AND VOMITING


     MORPHINE (Unverified  Allergy, Unknown, 7/23/19)


     CODEINE (Verified  Adverse Reaction, Mild, 1/25/13)


 nausea


Uncoded Allergies:  


     CODINE (Allergy, Unknown, 7/23/19)





Objective


Vital Signs





Last 24 Hour Vital Signs








  Date Time  Temp Pulse Resp B/P (MAP) Pulse Ox O2 Delivery O2 Flow Rate FiO2


 


7/24/19 16:00 98.3 71 21 141/70 (93) 99   


 


7/24/19 16:00  78      


 


7/24/19 12:00 98.5 70 21 146/69 (94) 98   


 


7/24/19 12:00  60      


 


7/24/19 09:00      Room Air  


 


7/24/19 08:40  67  121/63    


 


7/24/19 08:00  60      


 


7/24/19 08:00 98.7 67 20 121/63 (82) 98   


 


7/24/19 04:00 98.0 71 18 133/72 (92) 100   


 


7/24/19 03:47  67      


 


7/24/19 00:00  66      


 


7/24/19 00:00 98.2 77 18 149/80 (103) 95   


 


7/23/19 21:00      Room Air  


 


7/23/19 20:00 98.1 75 18 154/83 (106) 96   


 


7/23/19 20:00  79      








Height (Feet):  5


Height (Inches):  4.00


Weight (Pounds):  189


General Appearance:  no acute distress


HEENT:  mucous membranes moist


Respiratory/Chest:  lungs clear


Cardiovascular:  normal rate


Abdomen:  soft, non tender


Extremities:  no edema


Neurologic/Psychiatric:  alert, oriented x 3, responsive





Laboratory Tests








Test


  7/23/19


23:10 7/24/19


06:40


 


Troponin I


  0.000 ng/mL


(0.000-0.056) 0.000 ng/mL


(0.000-0.056)


 


White Blood Count


  


  4.0 K/UL


(4.8-10.8)  L


 


Red Blood Count


  


  3.78 M/UL


(4.20-5.40)  L


 


Hemoglobin


  


  12.9 G/DL


(12.0-16.0)


 


Hematocrit


  


  39.4 %


(37.0-47.0)


 


Mean Corpuscular Volume


  


  104 FL (80-99)


H


 


Mean Corpuscular Hemoglobin


  


  34.2 PG


(27.0-31.0)  H


 


Mean Corpuscular Hemoglobin


Concent 


  32.8 G/DL


(32.0-36.0)


 


Red Cell Distribution Width


  


  11.4 %


(11.6-14.8)  L


 


Platelet Count


  


  229 K/UL


(150-450)


 


Mean Platelet Volume


  


  6.1 FL


(6.5-10.1)  L


 


Neutrophils (%) (Auto)


  


  54.0 %


(45.0-75.0)


 


Lymphocytes (%) (Auto)


  


  34.7 %


(20.0-45.0)


 


Monocytes (%) (Auto)


  


  8.3 %


(1.0-10.0)


 


Eosinophils (%) (Auto)


  


  1.2 %


(0.0-3.0)


 


Basophils (%) (Auto)


  


  1.7 %


(0.0-2.0)


 


Sodium Level


  


  143 MMOL/L


(136-145)


 


Potassium Level


  


  3.9 MMOL/L


(3.5-5.1)


 


Chloride Level


  


  107 MMOL/L


()


 


Carbon Dioxide Level


  


  29 MMOL/L


(21-32)


 


Anion Gap


  


  7 mmol/L


(5-15)


 


Blood Urea Nitrogen


  


  12 mg/dL


(7-18)


 


Creatinine


  


  1.2 MG/DL


(0.55-1.30)


 


Estimat Glomerular Filtration


Rate 


   mL/min (>60)  


 


 


Glucose Level


  


  114 MG/DL


()  H


 


Calcium Level


  


  9.4 MG/DL


(8.5-10.1)











Current Medications








 Medications


  (Trade)  Dose


 Ordered  Sig/George


 Route


 PRN Reason  Start Time


 Stop Time Status Last Admin


Dose Admin


 


 Acetaminophen


  (Tylenol)  650 mg  Q6H  PRN


 ORAL


 Mild Pain/Temp > 100.5  7/23/19 15:45


 8/22/19 15:44  7/23/19 16:44


 


 


 Acetaminophen/


 Hydrocodone Bitart


  (Norco 5/325)  1 tab  Q8H  PRN


 ORAL


 For Pain  7/23/19 19:45


 7/30/19 19:44  7/23/19 20:53


 


 


 Aspirin


  (Ecotrin)  81 mg  DAILY


 ORAL


   7/24/19 09:00


 8/23/19 08:59  7/24/19 08:40


 


 


 Barium Sulfate


  (Readi-Cat 2)  450 ml  NOW  PRN


 ORAL


 Radiology Procedure  7/23/19 07:30


 7/25/19 07:18   


 


 


 Ceftriaxone


 Sodium 1 gm/


 Dextrose  55 ml @ 


 110 mls/hr  Q24H


 IVPB


   7/24/19 09:00


 7/31/19 08:59  7/24/19 08:40


 


 


 Clonidine HCl


  (Catapres tab)  0.2 mg  Q2H  PRN


 ORAL


 For High Blood Pressure  7/23/19 16:00


 8/22/19 15:59   


 


 


 Diazepam


  (Valium)  5 mg  BIDPRN  PRN


 ORAL


 ANXIETY  7/23/19 12:30


 7/30/19 12:29   


 


 


 Dicyclomine HCl


  (Bentyl)  10 mg  Q6H  PRN


 ORAL


 For Pain  7/23/19 18:00


 8/22/19 17:59   


 


 


 Diltiazem HCl


  (Cardizem CD)  240 mg  DAILY


 ORAL


   7/23/19 12:30


 8/22/19 12:29  7/24/19 08:40


 


 


 Gabapentin


  (Neurontin)  100 mg  TWICE A  DAY


 ORAL


   7/23/19 18:00


 8/22/19 17:59  7/24/19 17:30


 


 


 Heparin Sodium


  (Porcine)


  (Heparin 5000


 units/ml)  5,000 units  EVERY 12  HOURS


 SUBQ


   7/23/19 21:00


 8/22/19 20:59  7/24/19 08:42


 


 


 Hydrochlorothiazide


  (Hydrodiuril)  25 mg  DAILY


 ORAL


   7/23/19 12:30


 8/22/19 12:29  7/24/19 08:41


 


 


 Iopamidol


  (Isovue-300


 100ml)  100 ml  NOW  PRN


 INJ


 Radiology Procedure  7/23/19 07:30


 7/25/19 07:29   


 


 


 Ondansetron HCl


  (Zofran)  4 mg  Q6H  PRN


 IVP


 Nausea & Vomiting  7/23/19 15:45


 8/22/19 15:44  7/23/19 16:44


 


 


 Sodium Chloride  1,000 ml @ 


 70 mls/hr  [id number]


 IV


   7/23/19 12:15


 8/22/19 12:14  7/24/19 16:51


 

















Leonardo Jacob MD Jul 24, 2019 18:00

## 2019-07-24 NOTE — GENERAL PROGRESS NOTE
Assessment/Plan


Problem List:  


(1) UTI (urinary tract infection)


ICD Codes:  N39.0 - Urinary tract infection, site not specified


SNOMED:  79506904


(2) Nausea


ICD Codes:  R11.0 - Nausea


SNOMED:  853793560


(3) HTN (hypertension)


ICD Codes:  I10 - Essential (primary) hypertension


SNOMED:  55271613


(4) GI problem


ICD Codes:  R19.8 - GI problem


SNOMED:  826253156


(5) Abdominal pain


ICD Codes:  R10.9 - Unspecified abdominal pain


SNOMED:  20757305


(6) Diverticulitis


ICD Codes:  K57.92 - Diverticulitis of intestine, part unspecified, without 

perforation or abscess without bleeding


SNOMED:  673254786


Status:  stable, progressing


Assessment/Plan:


abx pain control gi f/u cbc bmp am





Subjective


Constitutional:  Reports: weakness


Allergies:  


Coded Allergies:  


     TRAMADOL (Verified  Allergy, Intermediate, 4/4/18)


 TREMORS AND VOMITING


     MORPHINE (Unverified  Allergy, Unknown, 7/23/19)


     CODEINE (Verified  Adverse Reaction, Mild, 1/25/13)


 nausea


Uncoded Allergies:  


     CODINE (Allergy, Unknown, 7/23/19)


All Systems:  reviewed and negative except above


Subjective


sl abd pain





Objective





Last 24 Hour Vital Signs








  Date Time  Temp Pulse Resp B/P (MAP) Pulse Ox O2 Delivery O2 Flow Rate FiO2


 


7/24/19 08:40  67  121/63    


 


7/24/19 04:00 98.0 71 18 133/72 (92) 100   


 


7/24/19 03:47  67      


 


7/24/19 00:00  66      


 


7/24/19 00:00 98.2 77 18 149/80 (103) 95   


 


7/23/19 21:00      Room Air  


 


7/23/19 20:00 98.1 75 18 154/83 (106) 96   


 


7/23/19 20:00  79      


 


7/23/19 16:00  92      


 


7/23/19 15:59 98.1 91 18 166/91 (116) 100   


 


7/23/19 12:50  92  167/88    


 


7/23/19 12:00 97.3 92 18 167/88 (114) 100   


 


7/23/19 12:00  70      


 


7/23/19 10:48      Room Air  


 


7/23/19 10:42  91      


 


7/23/19 10:38 97.7       


 


7/23/19 10:03 97.7 65 18 143/76 99 Room Air  


 


7/23/19 10:01 97.7 65 18 143/76 99 Room Air  

















Intake and Output  


 


 7/23/19 7/24/19





 19:00 07:00


 


Intake Total 390 ml 


 


Output Total 1000 ml 


 


Balance -610 ml 


 


  


 


Intake Oral 390 ml 


 


Output Urine Total 1000 ml 


 


# Voids 1 3








Laboratory Tests


7/23/19 15:30: Troponin I 0.004


7/23/19 23:10: Troponin I 0.000


7/24/19 06:40: 


Troponin I 0.000, White Blood Count 4.0L, Red Blood Count 3.78L, Hemoglobin 12.9

, Hematocrit 39.4, Mean Corpuscular Volume 104H, Mean Corpuscular Hemoglobin 

34.2H, Mean Corpuscular Hemoglobin Concent 32.8, Red Cell Distribution Width 

11.4L, Platelet Count 229, Mean Platelet Volume 6.1L, Neutrophils (%) (Auto) 

54.0, Lymphocytes (%) (Auto) 34.7, Monocytes (%) (Auto) 8.3, Eosinophils (%) (

Auto) 1.2, Basophils (%) (Auto) 1.7, Sodium Level 143, Potassium Level 3.9, 

Chloride Level 107, Carbon Dioxide Level 29, Anion Gap 7, Blood Urea Nitrogen 12

, Creatinine 1.2, Estimat Glomerular Filtration Rate , Glucose Level 114H, 

Calcium Level 9.4


Height (Feet):  5


Height (Inches):  4.00


Weight (Pounds):  189


General Appearance:  lethargic


EENT:  normal ENT inspection


Neck:  normal alignment


Cardiovascular:  normal peripheral pulses, normal rate, regular rhythm


Respiratory/Chest:  chest wall non-tender, lungs clear, normal breath sounds


Abdomen:  normal bowel sounds, non tender, soft


Extremities:  normal inspection


Edema:  no edema noted Arm (L), no edema noted Arm (R), no edema noted Leg (L), 

no edema noted Leg (R), no edema noted Pedal (L), no edema noted Pedal (R), no 

edema noted Generalized


Neurologic:  responsive, motor weakness


Skin:  normal pigmentation, warm/dry











Harvey Deluca DO Jul 24, 2019 09:22

## 2019-07-24 NOTE — NUR
NURSE NOTES:

Received pt and report from SIMON Ordonez. Observed pt resting in bed with family/friend member 
at bedside. Cardiac monitor is in placed, IV site intact, asymptomatic and patent. Bed is in 
the lowest position and locked. Call light within reach. No signs or symptoms of acute 
distress noted at this time. Will continue plan of care.

## 2019-07-24 NOTE — CONSULTATION
History of Present Illness


General


Chief Complaint:  Abdominal Pain





Present Illness


Allergies:  


Coded Allergies:  


     TRAMADOL (Verified  Allergy, Intermediate, 18)


 TREMORS AND VOMITING


     MORPHINE (Unverified  Allergy, Unknown, 19)


     CODEINE (Verified  Adverse Reaction, Mild, 13)


 nausea


Uncoded Allergies:  


     CODINE (Allergy, Unknown, 19)





Medication History


Scheduled


Albuterol Sulfate* (Proair Hfa*), 2 PUFFS INH Q4, (Reported)


Aspirin* (Aspir 81*), 81 MG ORAL DAILY, (Reported)


Atorvastatin Calcium* (Lipitor*), 20 MG ORAL BEDTIME, (Reported)


Azelastine/Fluticasone (Dymista Nasal Spray), 2 SPRAYS NS BID, (Reported)


Ceftriaxone Sodium (Ceftriaxone), 1 GM IV DAILY, (Reported)


Ciprofloxacin/Ciprofloxa Hcl (Ciprofloxacin Er 500 Mg Tablet), 500 MG PO Q12HR, 

(Reported)


Cyclobenzaprine Hcl* (Flexeril*), 5 MG ORAL DA, (Reported)


Dexlansoprazole (Dexilant), 60 MG ORAL DAILY, (Reported)


Dicyclomine Hcl* (Dicyclomine Hcl*), 10 MG ORAL EVERY 6 HOURS, (Reported)


Diltiazem Hcl (Diltiazem Er), 240 MG PO DAILY, (Reported)


Diltiazem Hcl* (Cartia Xt*), 240 MG ORAL DAILY, (Reported)


Docusate Sodium* (Docusate Sodium*), 100 MG ORAL TWICE A DAY, (Reported)


Duloxetine Hcl* (Cymbalta*), 30 MG ORAL DAILY, (Reported)


Fesoterodine Fumarate (Toviaz), 4 MG PO BID, (Reported)


Fluticasone/Vilanterol (Breo Ellipta 100-25 Mcg INH), 1 PUFF IH DAILY, (Reported

)


Gabapentin* (Gabapentin*), 100 MG ORAL TWICE A DAY, (Reported)


Hydralazine Hcl* (Hydralazine Hcl*), 25 MG ORAL BID, (Reported)


Hydrochlorothiazide (Microzide), 25 MG PO DAILY, (Reported)


Neomycin Sulfate (Neomycin Sulfate), 500 MG ORAL TWICE A DAY, (Reported)


Omeprazole (Omeprazole), 20 MG ORAL TWICE A DAY, (Reported)


Rosuvastatin Calcium* (Crestor*), 20 MG ORAL DAILY, (Reported)


Rosuvastatin Calcium* (Crestor*), 20 MG ORAL DAILY, (Reported)


Triamterene/Hydrochlorothiazid (Triamterene-Hctz 37.5-25 Mg Cp), 1 CAP ORAL 

DAILY, (Reported)





Scheduled PRN


Diazepam* (Diazepam*), 5 MG ORAL BID PRN for ANXIETY, (Reported)


Hydrocodone Bit/Acetaminophen 5-325* (Norco 5-325*), 1 TAB ORAL Q4H PRN for For 

Pain, (Reported)


Hydrocodone Bit/Acetaminophen 5-325* (Norco 5-325*), 2 TAB ORAL Q4H PRN for For 

Pain, (Reported)





Miscellaneous Medications


Ceftriaxone Sod (Rocephin), 250 MG IM, (Reported)





Patient History


Healthcare decision maker





Resuscitation status


Full Code


Advanced Directive on File


No





Physical Exam





Last 24 Hour Vital Signs








  Date Time  Temp Pulse Resp B/P (MAP) Pulse Ox O2 Delivery O2 Flow Rate FiO2


 


19 04:00 98.0 71 18 133/72 (92) 100   


 


19 03:47  67      


 


19 00:00  66      


 


19 00:00 98.2 77 18 149/80 (103) 95   


 


19 21:00      Room Air  


 


19 20:00 98.1 75 18 154/83 (106) 96   


 


19 20:00  79      


 


19 16:00  92      


 


19 15:59 98.1 91 18 166/91 (116) 100   


 


19 12:50  92  167/88    


 


19 12:00 97.3 92 18 167/88 (114) 100   


 


19 12:00  70      


 


19 10:48      Room Air  


 


19 10:42  91      


 


19 10:38 97.7       


 


19 10:03 97.7 65 18 143/76 99 Room Air  


 


19 10:01 97.7 65 18 143/76 99 Room Air  


 


19 07:58 97.7 88 18 159/77 98 Room Air  


 


19 07:58  88 18   Room Air  


 


19 07:08 97.7 88 18 182/102 (128) 98 Room Air  

















Intake and Output  


 


 19





 19:00 07:00


 


Intake Total 390 ml 


 


Output Total 1000 ml 


 


Balance -610 ml 


 


  


 


Intake Oral 390 ml 


 


Output Urine Total 1000 ml 


 


# Voids 1 











Laboratory Tests








Test


  19


07:55 19


08:48 19


15:30 19


23:10


 


White Blood Count


  6.2 K/UL


(4.8-10.8) 


  


  


 


 


Red Blood Count


  4.02 M/UL


(4.20-5.40)  L 


  


  


 


 


Hemoglobin


  13.6 G/DL


(12.0-16.0) 


  


  


 


 


Hematocrit


  41.6 %


(37.0-47.0) 


  


  


 


 


Mean Corpuscular Volume


  104 FL (80-99)


H 


  


  


 


 


Mean Corpuscular Hemoglobin


  33.8 PG


(27.0-31.0)  H 


  


  


 


 


Mean Corpuscular Hemoglobin


Concent 32.7 G/DL


(32.0-36.0) 


  


  


 


 


Red Cell Distribution Width


  11.2 %


(11.6-14.8)  L 


  


  


 


 


Platelet Count


  268 K/UL


(150-450) 


  


  


 


 


Mean Platelet Volume


  6.2 FL


(6.5-10.1)  L 


  


  


 


 


Neutrophils (%) (Auto)


  70.4 %


(45.0-75.0) 


  


  


 


 


Lymphocytes (%) (Auto)


  21.1 %


(20.0-45.0) 


  


  


 


 


Monocytes (%) (Auto)


  7.0 %


(1.0-10.0) 


  


  


 


 


Eosinophils (%) (Auto)


  0.8 %


(0.0-3.0) 


  


  


 


 


Basophils (%) (Auto)


  0.7 %


(0.0-2.0) 


  


  


 


 


Prothrombin Time


  9.5 SEC


(9.30-11.50) 


  


  


 


 


Prothromb Time International


Ratio 0.9 (0.9-1.1)  


  


  


  


 


 


Activated Partial


Thromboplast Time 24 SEC (23-33)


  


  


  


 


 


Sodium Level


  143 MMOL/L


(136-145) 


  


  


 


 


Potassium Level


  3.8 MMOL/L


(3.5-5.1) 


  


  


 


 


Chloride Level


  106 MMOL/L


() 


  


  


 


 


Carbon Dioxide Level


  31 MMOL/L


(21-32) 


  


  


 


 


Anion Gap


  6 mmol/L


(5-15) 


  


  


 


 


Blood Urea Nitrogen


  21 mg/dL


(7-18)  H 


  


  


 


 


Creatinine


  1.3 MG/DL


(0.55-1.30) 


  


  


 


 


Estimat Glomerular Filtration


Rate  mL/min (>60)  


  


  


  


 


 


Glucose Level


  126 MG/DL


()  H 


  


  


 


 


Calcium Level


  9.5 MG/DL


(8.5-10.1) 


  


  


 


 


Total Bilirubin


  0.6 MG/DL


(0.2-1.0) 


  


  


 


 


Aspartate Amino Transf


(AST/SGOT) 65 U/L (15-37)


H 


  


  


 


 


Alanine Aminotransferase


(ALT/SGPT) 42 U/L (12-78)


  


  


  


 


 


Alkaline Phosphatase


  123 U/L


()  H 


  


  


 


 


Troponin I


  0.001 ng/mL


(0.000-0.056) 


  0.004 ng/mL


(0.000-0.056) 0.000 ng/mL


(0.000-0.056)


 


Total Protein


  7.4 G/DL


(6.4-8.2) 


  


  


 


 


Albumin


  4.0 G/DL


(3.4-5.0) 


  


  


 


 


Globulin 3.4 g/dL     


 


Albumin/Globulin Ratio 1.2 (1.0-2.7)     


 


Lipase


  104 U/L


() 


  


  


 


 


Urine Color  Yellow    


 


Urine Appearance  Clear    


 


Urine pH  5 (4.5-8.0)    


 


Urine Specific Gravity


  


  1.020


(1.005-1.035) 


  


 


 


Urine Protein


  


  1+ (NEGATIVE)


H 


  


 


 


Urine Glucose (UA)


  


  Negative


(NEGATIVE) 


  


 


 


Urine Ketones


  


  Negative


(NEGATIVE) 


  


 


 


Urine Blood


  


  Negative


(NEGATIVE) 


  


 


 


Urine Nitrite


  


  Negative


(NEGATIVE) 


  


 


 


Urine Bilirubin


  


  Negative


(NEGATIVE) 


  


 


 


Urine Urobilinogen


  


  1 MG/DL


(0.0-1.0)  H 


  


 


 


Urine Leukocyte Esterase


  


  2+ (NEGATIVE)


H 


  


 


 


Urine RBC


  


  0-2 /HPF (0 -


2) 


  


 


 


Urine WBC


  


  2-4 /HPF (0 -


2) 


  


 


 


Urine Squamous Epithelial


Cells 


  Few /LPF


(NONE/OCC) 


  


 


 


Urine Bacteria


  


  Few /HPF


(NONE) 


  


 








Height (Feet):  5


Height (Inches):  4.00


Weight (Pounds):  190


Medications





Current Medications








 Medications


  (Trade)  Dose


 Ordered  Sig/George


 Route


 PRN Reason  Start Time


 Stop Time Status Last Admin


Dose Admin


 


 Acetaminophen


  (Tylenol)  650 mg  Q6H  PRN


 ORAL


 Mild Pain/Temp > 100.5  19 15:45


 19 15:44  19 16:44


 


 


 Acetaminophen/


 Hydrocodone Bitart


  (Norco 5/325)  1 tab  Q8H  PRN


 ORAL


 For Pain  19 19:45


 19 19:44  19 20:53


 


 


 Aspirin


  (Ecotrin)  81 mg  DAILY


 ORAL


   19 09:00


 19 08:59   


 


 


 Barium Sulfate


  (Readi-Cat 2)  450 ml  NOW  PRN


 ORAL


 Radiology Procedure  19 07:30


 19 07:18   


 


 


 Ceftriaxone


 Sodium 1 gm/


 Dextrose  55 ml @ 


 110 mls/hr  Q24H


 IVPB


   19 09:00


 19 08:59   


 


 


 Clonidine HCl


  (Catapres tab)  0.2 mg  Q2H  PRN


 ORAL


 For High Blood Pressure  19 16:00


 19 15:59   


 


 


 Diazepam


  (Valium)  5 mg  BIDPRN  PRN


 ORAL


 ANXIETY  19 12:30


 19 12:29   


 


 


 Dicyclomine HCl


  (Bentyl)  10 mg  Q6H  PRN


 ORAL


 For Pain  19 18:00


 19 17:59   


 


 


 Diltiazem HCl


  (Cardizem CD)  240 mg  DAILY


 ORAL


   19 12:30


 19 12:29  19 12:50


 


 


 Gabapentin


  (Neurontin)  100 mg  TWICE A  DAY


 ORAL


   19 18:00


 19 17:59  19 17:34


 


 


 Heparin Sodium


  (Porcine)


  (Heparin 5000


 units/ml)  5,000 units  EVERY 12  HOURS


 SUBQ


   19 21:00


 19 20:59  19 21:00


 


 


 Hydrochlorothiazide


  (Hydrodiuril)  25 mg  DAILY


 ORAL


   19 12:30


 19 12:29  19 12:50


 


 


 Iopamidol


  (Isovue-300


 100ml)  100 ml  NOW  PRN


 INJ


 Radiology Procedure  19 07:30


 19 07:29   


 


 


 Ondansetron HCl


  (Zofran)  4 mg  Q6H  PRN


 IVP


 Nausea & Vomiting  19 15:45


 19 15:44  19 16:44


 


 


 Sodium Chloride  1,000 ml @ 


 70 mls/hr  [id number]


 IV


   19 12:15


 19 12:14  19 02:33


 











Assessment/Plan


Assessment/Plan:


HEMATOLOGY/ONCOLOGY Consultation Note





DATE OF CONSULT: 19 


REFERRING PHYSICIAN: Jose Alberto Garcia


REASON FOR CONSULT: Renal mass, anemia hx 





HISTORY OF PRESENT ILLNESS: 


74y old female well known to me, hx of gi bleed in the past, sees us in the 

clinic as well also has been having abdominal cramps that has been going on for 

a week. The patient was admitted for diverticulitis eval flare that she has. 

The patient denies chills. Denies cough. Denies shortness of breath. Denies 

orthopnea. Hematology/Oncology services have been consulted for the evaluation 

of a renal mass that was found on an abd CT. 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 

clear cell papillary renal cell carcinoma. 





PAST MEDICAL HISTORY:  Significant for chest pain, history of diverticulitis, 

history of renal cancer, constipation, hypertension, chronic pain syndrome, 

hyperlipidemia, hypertension, and dyslipidemia.





PAST SURGICAL HISTORY:  Ovarian cyst removed, partial right nephrectomy for 

cancer.





ALLERGIES:  Tramadol, codeine.





SOCIAL HISTORY:  Denies history of smoking, alcohol, or illicit drugs.





REVIEW OF SYSTEMS:  


HEENT:  Denies headaches.    


RESPIRATORY:  Denies shortness of breath.  Denies cough  


CARDIOVASCULAR:  Denies chest pain.


GASTROINTESTINAL:  Reports vomiting blood and rectal bleed as well as abdominal 

cramps for about one week.   


EXTREMITIES:  Denies pain in the lower extremity.   


CNS:  No change in vision or speech pattern.





PHYSICAL EXAMINATION:


VITAL SIGNS:  Reviewed. 


HEENT:  PERRLA.


NECK:  Supple.  No lymphadenopathy.


CHEST:  Clear to auscultation.


CARDIOVASCULAR:  Regular rate and rhythm.  No murmurs or extra sounds.


GASTROINTESTINAL:  Diffuse abdominal pain.  Abdomen is soft.  No rebound. No 

organomegaly.


EXTREMITIES:  A 1+ edema.  Reflexes equal in both sides.  Moving all four 

extremities. 





MEDICATIONS:  Lipitor, Colace, Toviaz, Cymbalta, Colace, diltiazem, Crestor, 

hydrochlorothiazide.





IMAGIN/30: CT abd --> Possible early uncomplicated acute diverticulitis of the 

sigmoid. Extensive colonic diverticulosis elsewhere. Note apparent absence of 

previously demonstrated right renal contrast enhancing mass.This could indicate 

in visibility due to the lack of IV contrast, or could indicate prior therapy. 

Correlate with clinical/surgical history 4 mm rectangular density in the right 

middle lobe, visible on earlier  study and unchanged, presumed benign.





LABORATORY STUDIES:  


: wbc 6.1 hgb 12.8 plt 200k 


: wbc 6, hgb 13, plt 268k





ASSESSMENT AND PLAN 


# Renal cell carcinoma, status post nephrectomy that was completed 

approximately 1 month ago, pathology was reviewed and is pT1 staging does not 

require further treatment, lymph nodes not submitted, but no evidence of 

distant metastasis, also no evidence of positive margins noted on the tumor 

report


--> CT abd: 4 mm rectangular density in the right middle lobe, visible on 

earlier  study and unchanged, presumed benign


--> trend cr as per renal, baseline, cr is 1.4-1.5


--> cr trend: 1.4


--> monitor with serial imaging as needed


# Leukopenia is likely a reactive process v infection


--> smear peripherla has been reviewed


--> hepatitis and hiv in the past neg


--> no cirrhosis or hsm on us


--> thyroid studies are wnl


# Anemia of chronic disease


--> closely monitor 


--> currently is stable at 10.8->13


--> hold off any further workup at this time


# Sigmoid diverticulitis. ID and GI are following, appreciate recs. 


--> Abx per ID. 


--> PPI


# Hypertension. 


--> sbp goal <140


# COPD. 


# Chronic kidney disease. cr 1.4-1.5


#  Abd pain as per gi eval





The time note is entered does not reflect time of encounter.





GREATLY APPRECIATE CONSULTATION











Thomas Davis MD 2019 06:20

## 2019-07-25 NOTE — NUR
NURSE NOTES:

Received report from SIMON Matute. Patient is resting in bed, sleeping in semi-hopper's 
position. No signs of acute distress at this moment. Respiration even and non labored on 
room air. IV is patent and running at RX dose. Bed in lowest position, side rails up x2, 
wheels locked. Call light and bed side table within reach. Will continue plan of care.

## 2019-07-25 NOTE — INFECTIOUS DISEASES PROG NOTE
Assessment/Plan


Assessment/Plan


IMPRESSION: 


nausea, and vomiting, likely gastroenteritis.


Diabetes mellitus type 2, 


hypertension, COPD,


history of renal cell cancer, and right nephrectomy.





RECOMMENDATION:  


Observe off antibiotic





Subjective


ROS Limited/Unobtainable:  No


Constitutional:  Reports: no symptoms


Respiratory:  Reports: no symptoms


Cardiovascular:  Reports: no symptoms


Gastrointestinal/Abdominal:  Reports: nausea, other - abdominal pain in am


Allergies:  


Coded Allergies:  


     TRAMADOL (Verified  Allergy, Intermediate, 4/4/18)


 TREMORS AND VOMITING


     MORPHINE (Unverified  Allergy, Unknown, 7/23/19)


     CODEINE (Verified  Adverse Reaction, Mild, 1/25/13)


 nausea


Uncoded Allergies:  


     CODINE (Allergy, Unknown, 7/23/19)





Objective


Vital Signs





Last 24 Hour Vital Signs








  Date Time  Temp Pulse Resp B/P (MAP) Pulse Ox O2 Delivery O2 Flow Rate FiO2


 


7/25/19 12:00  59      


 


7/25/19 12:00 98.2 57 16 114/55 (74) 96   





  57      


 


7/25/19 09:00  56  128/71    


 


7/25/19 09:00      Room Air  


 


7/25/19 08:00  65      


 


7/25/19 08:00 97.6 64 16 128/71 (90) 95   





  65      


 


7/25/19 06:40  65  108/62 (77)    


 


7/25/19 04:06    165/82    


 


7/25/19 04:05 98.0 67 20 165/82 (109) 98   


 


7/25/19 03:30  66      


 


7/25/19 00:00 98.4 66 18 140/72 (94) 96   


 


7/25/19 00:00  71      


 


7/24/19 21:00      Room Air  


 


7/24/19 20:00 98.2 63 18 132/68 (89) 97   


 


7/24/19 20:00  66      








Height (Feet):  5


Height (Inches):  4.00


Weight (Pounds):  189


General Appearance:  no acute distress


Respiratory/Chest:  lungs clear


Cardiovascular:  normal rate


Abdomen:  soft, non tender


Extremities:  no edema


Neurologic/Psychiatric:  alert, oriented x 3, responsive





Laboratory Tests








Test


  7/25/19


05:45


 


White Blood Count


  3.4 K/UL


(4.8-10.8)  L


 


Red Blood Count


  3.49 M/UL


(4.20-5.40)  L


 


Hemoglobin


  11.6 G/DL


(12.0-16.0)  L


 


Hematocrit


  36.3 %


(37.0-47.0)  L


 


Mean Corpuscular Volume


  104 FL (80-99)


H


 


Mean Corpuscular Hemoglobin


  33.3 PG


(27.0-31.0)  H


 


Mean Corpuscular Hemoglobin


Concent 32.1 G/DL


(32.0-36.0)


 


Red Cell Distribution Width


  11.4 %


(11.6-14.8)  L


 


Platelet Count


  214 K/UL


(150-450)


 


Mean Platelet Volume


  6.3 FL


(6.5-10.1)  L


 


Neutrophils (%) (Auto)


  % (45.0-75.0)


 


 


Lymphocytes (%) (Auto)


  % (20.0-45.0)


 


 


Monocytes (%) (Auto)  % (1.0-10.0)  


 


Eosinophils (%) (Auto)  % (0.0-3.0)  


 


Basophils (%) (Auto)  % (0.0-2.0)  


 


Differential Total Cells


Counted 100  


 


 


Neutrophils % (Manual) 56 % (45-75)  


 


Lymphocytes % (Manual) 34 % (20-45)  


 


Monocytes % (Manual) 7 % (1-10)  


 


Eosinophils % (Manual) 3 % (0-3)  


 


Basophils % (Manual) 0 % (0-2)  


 


Band Neutrophils 0 % (0-8)  


 


Platelet Estimate Adequate  


 


Platelet Morphology Normal  


 


Sodium Level


  142 MMOL/L


(136-145)


 


Potassium Level


  3.7 MMOL/L


(3.5-5.1)


 


Chloride Level


  108 MMOL/L


()  H


 


Carbon Dioxide Level


  30 MMOL/L


(21-32)


 


Anion Gap


  4 mmol/L


(5-15)  L


 


Blood Urea Nitrogen


  4 mg/dL (7-18)


L


 


Creatinine


  1.2 MG/DL


(0.55-1.30)


 


Estimat Glomerular Filtration


Rate  mL/min (>60)  


 


 


Glucose Level


  136 MG/DL


()  H


 


Calcium Level


  9.0 MG/DL


(8.5-10.1)











Current Medications








 Medications


  (Trade)  Dose


 Ordered  Sig/George


 Route


 PRN Reason  Start Time


 Stop Time Status Last Admin


Dose Admin


 


 Acetaminophen


  (Tylenol)  650 mg  Q6H  PRN


 ORAL


 Mild Pain/Temp > 100.5  7/23/19 15:45


 8/22/19 15:44  7/23/19 16:44


 


 


 Acetaminophen/


 Hydrocodone Bitart


  (Norco 5/325)  1 tab  Q8H  PRN


 ORAL


 For Pain  7/23/19 19:45


 7/30/19 19:44  7/25/19 03:47


 


 


 Aspirin


  (Ecotrin)  81 mg  DAILY


 ORAL


   7/24/19 09:00


 8/23/19 08:59  7/25/19 09:21


 


 


 Clonidine HCl


  (Catapres tab)  0.2 mg  Q2H  PRN


 ORAL


 For High Blood Pressure  7/23/19 16:00


 8/22/19 15:59  7/25/19 04:06


 


 


 Diazepam


  (Valium)  5 mg  BIDPRN  PRN


 ORAL


 ANXIETY  7/23/19 12:30


 7/30/19 12:29   


 


 


 Dicyclomine HCl


  (Bentyl)  10 mg  Q6H  PRN


 ORAL


 For Pain  7/23/19 18:00


 8/22/19 17:59   


 


 


 Diltiazem HCl


  (Cardizem CD)  240 mg  DAILY


 ORAL


   7/23/19 12:30


 8/22/19 12:29  7/24/19 08:40


 


 


 Gabapentin


  (Neurontin)  100 mg  TWICE A  DAY


 ORAL


   7/23/19 18:00


 8/22/19 17:59  7/25/19 09:21


 


 


 Heparin Sodium


  (Porcine)


  (Heparin 5000


 units/ml)  5,000 units  EVERY 12  HOURS


 SUBQ


   7/23/19 21:00


 8/22/19 20:59  7/25/19 09:22


 


 


 Hydrochlorothiazide


  (Hydrodiuril)  25 mg  DAILY


 ORAL


   7/23/19 12:30


 8/22/19 12:29  7/25/19 09:21


 


 


 Ondansetron HCl


  (Zofran)  4 mg  Q6H  PRN


 IVP


 Nausea & Vomiting  7/23/19 15:45


 8/22/19 15:44  7/25/19 03:46


 


 


 Regadenoson


  (Lexiscan)  0.4 mg  ONCE  PRN


 IV


 Stress Test  7/25/19 10:45


 7/26/19 23:59   


 


 


 Sodium Chloride  1,000 ml @ 


 70 mls/hr  [id number]


 IV


   7/23/19 12:15


 8/22/19 12:14  7/24/19 16:51


 

















Leonardo Jacob MD Jul 25, 2019 17:12

## 2019-07-25 NOTE — GENERAL PROGRESS NOTE
Assessment/Plan


Status:  stable, progressing


Assessment/Plan:





ASSESSMENT AND PLAN 


# Renal cell carcinoma, status post nephrectomy that was completed 

approximately 1 month ago, pathology was reviewed and is pT1 staging does not 

require further treatment, lymph nodes not submitted, but no evidence of 

distant metastasis, also no evidence of positive margins noted on the tumor 

report


--> CT abd: 4 mm rectangular density in the right middle lobe, visible on 

earlier 2015 study and unchanged, presumed benign


--> trend cr as per renal, baseline, cr is 1.4-1.5-->1.2


--> monitor with serial imaging as needed


# Leukopenia is likely a reactive process v infection


--> smear peripheral has been reviewed


--> hepatitis and hiv in the past neg


--> no cirrhosis or hsm on us


--> thyroid studies are wnl


# Anemia of chronic disease


--> closely monitor 


--> currently is stable at 10.8->13


--> hold off any further workup at this time


# Sigmoid diverticulitis. ID and GI are following, appreciate recs. 


--> Abx per ID. 


--> PPI


# Hypertension. 


--> sbp goal <140


# COPD. 


# Chronic kidney disease. cr 1.4-1.5


#  Abd pain as per gi eval


--> for gastroenteritis getting zofran prn basis





The time note is entered does not reflect time of encounter.





GREATLY APPRECIATE CONSULTATION





Subjective


Constitutional:  Denies: no symptoms, chills, diaphoresis, fever, malaise, 

weakness, other


HEENT:  Denies: no symptoms, eye pain, blurred vision, tearing, double vision, 

ear pain, ear discharge, nose pain, nose congestion, throat pain, throat 

swelling, mouth pain, mouth swelling, other


Respiratory:  Denies: no symptoms, cough, orthopnea, shortness of breath, SOB 

with excertion, SOB at rest, sputum, stridor, wheezing, other


Genitourinary:  Denies: no symptoms, burning, discharge, frequency, flank pain, 

hematuria, incontinence, pain, urgency, other


Endocrine:  Denies: no symptoms, excessive sweating, flushing, intolerance to 

cold, intolerance to heat, increased hunger, increased thirst, increased urine, 

unexplained weight gain, unexplained weight loss, other


Allergies:  


Coded Allergies:  


     TRAMADOL (Verified  Allergy, Intermediate, 4/4/18)


 TREMORS AND VOMITING


     MORPHINE (Unverified  Allergy, Unknown, 7/23/19)


     CODEINE (Verified  Adverse Reaction, Mild, 1/25/13)


 nausea


Uncoded Allergies:  


     CODINE (Allergy, Unknown, 7/23/19)


Subjective


7/25: no fevers or chills noted, with nausea and vomiting this am, on zofran prn





Objective





Last 24 Hour Vital Signs








  Date Time  Temp Pulse Resp B/P (MAP) Pulse Ox O2 Delivery O2 Flow Rate FiO2


 


7/25/19 09:00  56  128/71    


 


7/25/19 08:00 97.6 64 16 128/71 (90) 95   





  65      


 


7/25/19 06:40  65  108/62 (77)    


 


7/25/19 04:06    165/82    


 


7/25/19 04:05 98.0 67 20 165/82 (109) 98   


 


7/25/19 03:30  66      


 


7/25/19 00:00 98.4 66 18 140/72 (94) 96   


 


7/25/19 00:00  71      


 


7/24/19 21:00      Room Air  


 


7/24/19 20:00 98.2 63 18 132/68 (89) 97   


 


7/24/19 20:00  66      


 


7/24/19 16:00 98.3 71 21 141/70 (93) 99   


 


7/24/19 16:00  78      


 


7/24/19 12:00 98.5 70 21 146/69 (94) 98   


 


7/24/19 12:00  60      

















Intake and Output  


 


 7/24/19 7/25/19





 19:00 07:00


 


Intake Total 770 ml 800 ml


 


Output Total 800 ml 


 


Balance -30 ml 800 ml


 


  


 


Intake Oral 700 ml 240 ml


 


IV Total 70 ml 560 ml


 


Output Urine Total 800 ml 


 


# Voids 3 1








Laboratory Tests


7/25/19 05:45: 


White Blood Count 3.4L, Red Blood Count 3.49L, Hemoglobin 11.6L, Hematocrit 

36.3L, Mean Corpuscular Volume 104H, Mean Corpuscular Hemoglobin 33.3H, Mean 

Corpuscular Hemoglobin Concent 32.1, Red Cell Distribution Width 11.4L, 

Platelet Count 214, Mean Platelet Volume 6.3L, Neutrophils (%) (Auto) , 

Lymphocytes (%) (Auto) , Monocytes (%) (Auto) , Eosinophils (%) (Auto) , 

Basophils (%) (Auto) , Differential Total Cells Counted 100, Neutrophils % (

Manual) 56, Lymphocytes % (Manual) 34, Monocytes % (Manual) 7, Eosinophils % (

Manual) 3, Basophils % (Manual) 0, Band Neutrophils 0, Platelet Estimate 

Adequate, Platelet Morphology Normal, Sodium Level 142, Potassium Level 3.7, 

Chloride Level 108H, Carbon Dioxide Level 30, Anion Gap 4L, Blood Urea Nitrogen 

4L, Creatinine 1.2, Estimat Glomerular Filtration Rate , Glucose Level 136H, 

Calcium Level 9.0


Height (Feet):  5


Height (Inches):  4.00


Weight (Pounds):  189


Objective





PHYSICAL EXAMINATION:


VITAL SIGNS:  Reviewed. 


HEENT:  PERRLA.


NECK:  Supple.  No lymphadenopathy.


CHEST:  Clear to auscultation.


CARDIOVASCULAR:  Regular rate and rhythm.  No murmurs or extra sounds.


GASTROINTESTINAL:  Diffuse abdominal pain.  Abd is soft


EXTREMITIES:  A 1+ edema.  Reflexes equal in both sides.  Moving all four 

extremities.











Thomas Davis MD Jul 25, 2019 09:48

## 2019-07-25 NOTE — CARDIAC ELECTROPHYSIOLOGY PN
Assessment/Plan


Assessment/Plan


1. Accelerated hypertension.  On Cardizem  mg daily and 

hydrochlorothiazide 25 mg daily.





2. Abnormal electrocardiogram and epigastric pain. Ruled out for MI . Ef 50%. 

Schedule for stress test today





3. Hyperlipidemia on Crestor 20 mg daily.


4. Epigastric pain.  The patient underwent abdominopelvic CT scan that


showed no definite acute abnormality.  Further evaluation by Dr. Couch.


5. Renal cell carcinoma, status post nephrectomy that was completed 

approximately 1 month ago FU Dr Blanco.





D WRN





Subjective


Subjective


Remained in SR. Still occasional CP.Was evaluated by Dr. Couch





Objective





Last 24 Hour Vital Signs








  Date Time  Temp Pulse Resp B/P (MAP) Pulse Ox O2 Delivery O2 Flow Rate FiO2


 


7/25/19 09:00  56  128/71    


 


7/25/19 08:00 97.6 64 16 128/71 (90) 95   





  65      


 


7/25/19 06:40  65  108/62 (77)    


 


7/25/19 04:06    165/82    


 


7/25/19 04:05 98.0 67 20 165/82 (109) 98   


 


7/25/19 03:30  66      


 


7/25/19 00:00 98.4 66 18 140/72 (94) 96   


 


7/25/19 00:00  71      


 


7/24/19 21:00      Room Air  


 


7/24/19 20:00 98.2 63 18 132/68 (89) 97   


 


7/24/19 20:00  66      


 


7/24/19 16:00 98.3 71 21 141/70 (93) 99   


 


7/24/19 16:00  78      


 


7/24/19 12:00 98.5 70 21 146/69 (94) 98   


 


7/24/19 12:00  60      

















Intake and Output  


 


 7/24/19 7/25/19





 19:00 07:00


 


Intake Total 770 ml 800 ml


 


Output Total 800 ml 


 


Balance -30 ml 800 ml


 


  


 


Intake Oral 700 ml 240 ml


 


IV Total 70 ml 560 ml


 


Output Urine Total 800 ml 


 


# Voids 3 1











Laboratory Tests








Test


  7/25/19


05:45


 


White Blood Count


  3.4 K/UL


(4.8-10.8)  L


 


Red Blood Count


  3.49 M/UL


(4.20-5.40)  L


 


Hemoglobin


  11.6 G/DL


(12.0-16.0)  L


 


Hematocrit


  36.3 %


(37.0-47.0)  L


 


Mean Corpuscular Volume


  104 FL (80-99)


H


 


Mean Corpuscular Hemoglobin


  33.3 PG


(27.0-31.0)  H


 


Mean Corpuscular Hemoglobin


Concent 32.1 G/DL


(32.0-36.0)


 


Red Cell Distribution Width


  11.4 %


(11.6-14.8)  L


 


Platelet Count


  214 K/UL


(150-450)


 


Mean Platelet Volume


  6.3 FL


(6.5-10.1)  L


 


Neutrophils (%) (Auto)


  % (45.0-75.0)


 


 


Lymphocytes (%) (Auto)


  % (20.0-45.0)


 


 


Monocytes (%) (Auto)  % (1.0-10.0)  


 


Eosinophils (%) (Auto)  % (0.0-3.0)  


 


Basophils (%) (Auto)  % (0.0-2.0)  


 


Differential Total Cells


Counted 100  


 


 


Neutrophils % (Manual) 56 % (45-75)  


 


Lymphocytes % (Manual) 34 % (20-45)  


 


Monocytes % (Manual) 7 % (1-10)  


 


Eosinophils % (Manual) 3 % (0-3)  


 


Basophils % (Manual) 0 % (0-2)  


 


Band Neutrophils 0 % (0-8)  


 


Platelet Estimate Adequate  


 


Platelet Morphology Normal  


 


Sodium Level


  142 MMOL/L


(136-145)


 


Potassium Level


  3.7 MMOL/L


(3.5-5.1)


 


Chloride Level


  108 MMOL/L


()  H


 


Carbon Dioxide Level


  30 MMOL/L


(21-32)


 


Anion Gap


  4 mmol/L


(5-15)  L


 


Blood Urea Nitrogen


  4 mg/dL (7-18)


L


 


Creatinine


  1.2 MG/DL


(0.55-1.30)


 


Estimat Glomerular Filtration


Rate  mL/min (>60)  


 


 


Glucose Level


  136 MG/DL


()  H


 


Calcium Level


  9.0 MG/DL


(8.5-10.1)








Objective





HEAD AND NECK:  No JVD or carotid bruits.


LUNGS:  Clear.


CARDIOVASCULAR:  Regular S1 and S2 with no gallop or murmur.


ABDOMEN:  Soft.


EXTREMITIES:  No pitting edema.











Mario Verma MD Jul 25, 2019 10:37

## 2019-07-25 NOTE — NUR
***DISCHARGE PLANNING

DISCHARGE DISCUSSED WITH DR COLINDRES. ORDER GIVEN AND ENTERED

DR ANDREWS NOW COVERING FOR DR COLINDRES

## 2019-07-25 NOTE — CONSULTATION
DATE OF CONSULTATION:  07/25/2019

CONSULTING PHYSICIAN:  Sunil Estrada M.D.



HISTORY OF PRESENT ILLNESS:  This is a 74-year-old female with history of

anxiety, multiple medical comorbidities, has been admitted to the hospital

for medical stabilization.  The patient presented with abdominal pain,

nausea, vomiting, diverticulitis in addition to his anxiety.



PAST PSYCHIATRY HISTORY:  The patient has a history of anxiety disorder,

has been on Valium.



PAST MEDICAL HISTORY:  As above.



ALLERGIES:  No known drug allergies.



SUBSTANCE ABUSE HISTORY:  No known history of illicit drug use or

alcohol.



MENTAL STATUS EXAMINATION:  The patient is alert, oriented times self,

place, and situation.  Mood is anxious.  Affect is constricted, congruent

with mood.  Thought process is concrete.  Thought content, no suicidal or

homicidal ideation.



ASSESSMENT:

Axis I  Anxiety disorder.

Axis II  Deferred.

Axis III  As above.

Axis IV  Low.

Axis V  50



PLAN:  The patient is on Ativan after the Valium as needed.  The patient

_____ on any other psychotropic medication.



Provide the patient with reality orientation and supportive therapy.









  ______________________________________________

  Sunil Estrada M.D.





DR:  TIFFANY

D:  07/25/2019 17:39

T:  07/25/2019 22:51

JOB#:  [id number]

CC:

## 2019-07-25 NOTE — NUR
NURSE NOTES:

Received pt and report from SIMON Lester. Observed pt resting in bed with friend/family member 
at bedside. Cardiac monitor is in placed, IV site intact, asymptomatic and patent. Bed is in 
the lowest position and locked. Call light within reach. Pt will be NPO at midnight for 
scheduled stress test tomorrow 7/26/19. No signs or symptoms of acute distress noted at this 
time. Will continue plan of care.

## 2019-07-25 NOTE — GENERAL PROGRESS NOTE
Assessment/Plan


Status:  stable, progressing


Assessment/Plan:


Assessment


- Acute onset epigastric pain of unclear etiology (? viral , ? gastritis, ? 

constipation)


- symptoms not similar to LLQ pain, associated with diverticulitis in the past


- chronic recurrent abdominal pain


- h/o Renal cell CA





Recommendations


- PRN laxative


- PPI


- follow symptoms for now


- d/c planning per PMD





Subjective


Allergies:  


Coded Allergies:  


     TRAMADOL (Verified  Allergy, Intermediate, 4/4/18)


 TREMORS AND VOMITING


     MORPHINE (Unverified  Allergy, Unknown, 7/23/19)


     CODEINE (Verified  Adverse Reaction, Mild, 1/25/13)


 nausea


Uncoded Allergies:  


     CODINE (Allergy, Unknown, 7/23/19)


Subjective


Feels better


less abd pain


d/w patient re findings to date





Objective





Last 24 Hour Vital Signs








  Date Time  Temp Pulse Resp B/P (MAP) Pulse Ox O2 Delivery O2 Flow Rate FiO2


 


7/25/19 16:00  59      


 


7/25/19 16:00 99.1 61 16 122/67 (85) 99   


 


7/25/19 12:01  59      


 


7/25/19 12:00 98.2 57 16 114/55 (74) 96   





  57      


 


7/25/19 09:00  56  128/71    


 


7/25/19 09:00      Room Air  


 


7/25/19 08:00  65      


 


7/25/19 08:00 97.6 64 16 128/71 (90) 95   





  65      


 


7/25/19 06:40  65  108/62 (77)    


 


7/25/19 04:06    165/82    


 


7/25/19 04:05 98.0 67 20 165/82 (109) 98   


 


7/25/19 03:30  66      


 


7/25/19 00:00 98.4 66 18 140/72 (94) 96   


 


7/25/19 00:00  71      


 


7/24/19 21:00      Room Air  


 


7/24/19 20:00 98.2 63 18 132/68 (89) 97   


 


7/24/19 20:00  66      

















Intake and Output  


 


 7/24/19 7/25/19





 19:00 07:00


 


Intake Total 770 ml 800 ml


 


Output Total 800 ml 


 


Balance -30 ml 800 ml


 


  


 


Intake Oral 700 ml 240 ml


 


IV Total 70 ml 560 ml


 


Output Urine Total 800 ml 


 


# Voids 3 1








Laboratory Tests


7/25/19 05:45: 


White Blood Count 3.4L, Red Blood Count 3.49L, Hemoglobin 11.6L, Hematocrit 

36.3L, Mean Corpuscular Volume 104H, Mean Corpuscular Hemoglobin 33.3H, Mean 

Corpuscular Hemoglobin Concent 32.1, Red Cell Distribution Width 11.4L, 

Platelet Count 214, Mean Platelet Volume 6.3L, Neutrophils (%) (Auto) , 

Lymphocytes (%) (Auto) , Monocytes (%) (Auto) , Eosinophils (%) (Auto) , 

Basophils (%) (Auto) , Differential Total Cells Counted 100, Neutrophils % (

Manual) 56, Lymphocytes % (Manual) 34, Monocytes % (Manual) 7, Eosinophils % (

Manual) 3, Basophils % (Manual) 0, Band Neutrophils 0, Platelet Estimate 

Adequate, Platelet Morphology Normal, Sodium Level 142, Potassium Level 3.7, 

Chloride Level 108H, Carbon Dioxide Level 30, Anion Gap 4L, Blood Urea Nitrogen 

4L, Creatinine 1.2, Estimat Glomerular Filtration Rate , Glucose Level 136H, 

Calcium Level 9.0


Height (Feet):  5


Height (Inches):  4.00


Weight (Pounds):  189


Objective


WD obese AA woman


NCAT


supple


CTA


RR


abd soft, Mild Epigastric TTP


no edema


non focal











Blanca Couch MD Jul 25, 2019 19:40

## 2019-07-26 NOTE — HEMATOLOGY/ONC PROGRESS NOTE
Assessment/Plan


Assessment/Plan





ASSESSMENT AND PLAN 


# Renal cell carcinoma, status post nephrectomy that was completed 

approximately 1 month ago, pathology was reviewed and is pT1 staging does not 

require further treatment, lymph nodes not submitted, but no evidence of 

distant metastasis, also no evidence of positive margins noted on the tumor 

report


--> CT abd: 4 mm rectangular density in the right middle lobe, visible on 

earlier 2015 study and unchanged, presumed benign


--> trend cr as per renal, baseline, Cr is 1.4-1.5-->1.2


--> monitor with serial imaging as needed


# Leukopenia is likely a reactive process v infection


--> smear peripheral has been reviewed


--> hepatitis and hiv in the past neg


--> no cirrhosis or hsm on us


--> thyroid studies are wnl


# Anemia of chronic disease


--> closely monitor 


--> currently is stable at 10.8->13


--> hold off any further workup at this time


# Sigmoid diverticulitis. ID and GI are following, appreciate recs. 


--> Abx per ID. 


--> PPI


# Hypertension. 


--> sbp goal <140


# COPD


--> w/o exacerbation


# Chronic kidney disease. cr 1.4-1.5


# Abd pain as per gi eval


--> for gastroenteritis getting zofran prn basis





The time note is entered does not reflect time of encounter.





GREATLY APPRECIATE CONSULTATION





Subjective


Constitutional:  Denies: no symptoms, chills, fever, malaise, weakness, other


HEENT:  Denies: no symptoms, eye pain, blurred vision, tearing, double vision, 

ear pain, ear discharge, nose pain, nose congestion, throat pain, throat 

swelling, mouth pain, mouth swelling, other


Cardiovascular:  Denies: no symptoms, chest pain, edema, irregular heart rate, 

lightheadedness, palpitations, syncope, other


Respiratory:  Denies: no symptoms, cough, shortness of breath, SOB with 

excertion, SOB at rest, sputum, wheezing, other


Gastrointestinal/Abdominal:  Denies: no symptoms, abdomen distended, abdominal 

pain, black stools, tarry stools, blood in stool, constipated, diarrhea, 

difficulty swallowing, nausea, poor appetite, poor fluid intake, rectal bleeding

, vomiting, other


Genitourinary:  Denies: no symptoms, burning, discharge, frequency, flank pain, 

hematuria, incontinence, pain, urgency, other


Neurologic/Psychiatric:  Denies: no symptoms, anxiety, depressed, emotional 

problems, headache, numbness, paresthesia, pre-existing deficit, seizure, 

tingling, tremors, weakness, other


Endocrine:  Denies: no symptoms, excessive sweating, flushing, intolerance to 

cold, intolerance to heat, increased hunger, increased thirst, increased urine, 

unexplained weight gain, unexplained weight loss, other


Allergies:  


Coded Allergies:  


     TRAMADOL (Verified  Allergy, Intermediate, 4/4/18)


 TREMORS AND VOMITING


     MORPHINE (Unverified  Allergy, Unknown, 7/23/19)


     CODEINE (Verified  Adverse Reaction, Mild, 1/25/13)


 nausea


Uncoded Allergies:  


     CODINE (Allergy, Unknown, 7/23/19)


Subjective


7/25: no fevers or chills noted, with nausea and vomiting this am, on zofran prn


7/26: no change in condition, no f/c noted, no bleeding





Objective


Objective





Current Medications








 Medications


  (Trade)  Dose


 Ordered  Sig/George


 Route


 PRN Reason  Start Time


 Stop Time Status Last Admin


Dose Admin


 


 Acetaminophen


  (Tylenol)  650 mg  Q6H  PRN


 ORAL


 Mild Pain/Temp > 100.5  7/23/19 15:45


 8/22/19 15:44  7/23/19 16:44


 


 


 Acetaminophen/


 Hydrocodone Bitart


  (Norco 5/325)  1 tab  Q8H  PRN


 ORAL


 For Pain  7/23/19 19:45


 7/30/19 19:44  7/25/19 03:47


 


 


 Aspirin


  (Ecotrin)  81 mg  DAILY


 ORAL


   7/24/19 09:00


 8/23/19 08:59  7/25/19 09:21


 


 


 Clonidine HCl


  (Catapres tab)  0.2 mg  Q2H  PRN


 ORAL


 For High Blood Pressure  7/23/19 16:00


 8/22/19 15:59  7/26/19 04:14


 


 


 Diazepam


  (Valium)  5 mg  BIDPRN  PRN


 ORAL


 ANXIETY  7/23/19 12:30


 7/30/19 12:29   


 


 


 Dicyclomine HCl


  (Bentyl)  10 mg  Q6H  PRN


 ORAL


 For Pain  7/23/19 18:00


 8/22/19 17:59   


 


 


 Diltiazem HCl


  (Cardizem CD)  240 mg  DAILY


 ORAL


   7/23/19 12:30


 8/22/19 12:29  7/24/19 08:40


 


 


 Gabapentin


  (Neurontin)  100 mg  TWICE A  DAY


 ORAL


   7/23/19 18:00


 8/22/19 17:59  7/25/19 17:54


 


 


 Heparin Sodium


  (Porcine)


  (Heparin 5000


 units/ml)  5,000 units  EVERY 12  HOURS


 SUBQ


   7/23/19 21:00


 8/22/19 20:59  7/25/19 21:07


 


 


 Hydrochlorothiazide


  (Hydrodiuril)  25 mg  DAILY


 ORAL


   7/23/19 12:30


 8/22/19 12:29  7/25/19 09:21


 


 


 Ondansetron HCl


  (Zofran)  4 mg  Q6H  PRN


 IVP


 Nausea & Vomiting  7/23/19 15:45


 8/22/19 15:44  7/25/19 17:54


 


 


 Regadenoson


  (Lexiscan)  0.4 mg  ONCE  PRN


 IV


 Stress Test  7/25/19 10:45


 7/26/19 23:59   


 


 


 Sodium Chloride  1,000 ml @ 


 70 mls/hr  [id number]


 IV


   7/23/19 12:15


 8/22/19 12:14  7/24/19 16:51


 











Last 24 Hour Vital Signs








  Date Time  Temp Pulse Resp B/P (MAP) Pulse Ox O2 Delivery O2 Flow Rate FiO2


 


7/26/19 04:14    163/85    


 


7/26/19 04:00 98.0 74 17 163/85 (111) 97   


 


7/26/19 04:00  66      


 


7/26/19 00:00  55      


 


7/26/19 00:00 97.7 60 18 155/69 (97) 97   


 


7/25/19 21:00      Room Air  


 


7/25/19 20:00 97.7 64 18 156/91 (112) 98   


 


7/25/19 20:00  59      


 


7/25/19 16:00  59      


 


7/25/19 16:00 99.1 61 16 122/67 (85) 99   


 


7/25/19 12:01  59      


 


7/25/19 12:00 98.2 57 16 114/55 (74) 96   





  57      


 


7/25/19 09:00  56  128/71    


 


7/25/19 09:00      Room Air  


 


7/25/19 08:00  65      


 


7/25/19 08:00 97.6 64 16 128/71 (90) 95   





  65      


 


7/25/19 06:40  65  108/62 (77)    


 


7/25/19 04:06    165/82    


 


7/25/19 04:05 98.0 67 20 165/82 (109) 98   


 


7/25/19 03:30  66      


 


7/25/19 00:00 98.4 66 18 140/72 (94) 96   


 


7/25/19 00:00  71      


 


7/24/19 21:00      Room Air  


 


7/24/19 20:00 98.2 63 18 132/68 (89) 97   


 


7/24/19 20:00  66      


 


7/24/19 16:00 98.3 71 21 141/70 (93) 99   


 


7/24/19 16:00  78      


 


7/24/19 12:00 98.5 70 21 146/69 (94) 98   


 


7/24/19 12:00  60      


 


7/24/19 09:00      Room Air  


 


7/24/19 08:40  67  121/63    

















Intake and Output  


 


 7/25/19 7/26/19





 18:59 06:59


 


Intake Total 360 ml 420 ml


 


Balance 360 ml 420 ml


 


  


 


Intake Oral 360 ml 


 


IV Total  420 ml


 


# Voids 3 3











Labs








Test


  7/23/19


08:48 7/23/19


15:30 7/23/19


23:10 7/24/19


06:40


 


Urine Color Yellow    


 


Urine Appearance Clear    


 


Urine pH 5 (4.5-8.0)    


 


Urine Specific Gravity


  1.020


(1.005-1.035) 


  


  


 


 


Urine Protein 1+ (NEGATIVE)    


 


Urine Glucose (UA)


  Negative


(NEGATIVE) 


  


  


 


 


Urine Ketones


  Negative


(NEGATIVE) 


  


  


 


 


Urine Blood


  Negative


(NEGATIVE) 


  


  


 


 


Urine Nitrite


  Negative


(NEGATIVE) 


  


  


 


 


Urine Bilirubin


  Negative


(NEGATIVE) 


  


  


 


 


Urine Urobilinogen


  1 MG/DL


(0.0-1.0) 


  


  


 


 


Urine Leukocyte Esterase 2+ (NEGATIVE)    


 


Urine RBC


  0-2 /HPF (0 -


2) 


  


  


 


 


Urine WBC


  2-4 /HPF (0 -


2) 


  


  


 


 


Urine Squamous Epithelial


Cells Few /LPF


(NONE/OCC) 


  


  


 


 


Urine Bacteria


  Few /HPF


(NONE) 


  


  


 


 


Troponin I


  


  0.004 ng/mL


(0.000-0.056) 0.000 ng/mL


(0.000-0.056) 0.000 ng/mL


(0.000-0.056)


 


White Blood Count


  


  


  


  4.0 K/UL


(4.8-10.8)


 


Red Blood Count


  


  


  


  3.78 M/UL


(4.20-5.40)


 


Hemoglobin


  


  


  


  12.9 G/DL


(12.0-16.0)


 


Hematocrit


  


  


  


  39.4 %


(37.0-47.0)


 


Mean Corpuscular Volume    104 FL (80-99) 


 


Mean Corpuscular Hemoglobin


  


  


  


  34.2 PG


(27.0-31.0)


 


Mean Corpuscular Hemoglobin


Concent 


  


  


  32.8 G/DL


(32.0-36.0)


 


Red Cell Distribution Width


  


  


  


  11.4 %


(11.6-14.8)


 


Platelet Count


  


  


  


  229 K/UL


(150-450)


 


Mean Platelet Volume


  


  


  


  6.1 FL


(6.5-10.1)


 


Neutrophils (%) (Auto)


  


  


  


  54.0 %


(45.0-75.0)


 


Lymphocytes (%) (Auto)


  


  


  


  34.7 %


(20.0-45.0)


 


Monocytes (%) (Auto)


  


  


  


  8.3 %


(1.0-10.0)


 


Eosinophils (%) (Auto)


  


  


  


  1.2 %


(0.0-3.0)


 


Basophils (%) (Auto)


  


  


  


  1.7 %


(0.0-2.0)


 


Sodium Level


  


  


  


  143 MMOL/L


(136-145)


 


Potassium Level


  


  


  


  3.9 MMOL/L


(3.5-5.1)


 


Chloride Level


  


  


  


  107 MMOL/L


()


 


Carbon Dioxide Level


  


  


  


  29 MMOL/L


(21-32)


 


Anion Gap


  


  


  


  7 mmol/L


(5-15)


 


Blood Urea Nitrogen


  


  


  


  12 mg/dL


(7-18)


 


Creatinine


  


  


  


  1.2 MG/DL


(0.55-1.30)


 


Estimat Glomerular Filtration


Rate 


  


  


   mL/min (>60) 


 


 


Glucose Level


  


  


  


  114 MG/DL


()


 


Calcium Level


  


  


  


  9.4 MG/DL


(8.5-10.1)


 


Test


  7/25/19


05:45 7/26/19


06:06 


  


 


 


White Blood Count


  3.4 K/UL


(4.8-10.8) 3.7 K/UL


(4.8-10.8) 


  


 


 


Red Blood Count


  3.49 M/UL


(4.20-5.40) 3.42 M/UL


(4.20-5.40) 


  


 


 


Hemoglobin


  11.6 G/DL


(12.0-16.0) 11.6 G/DL


(12.0-16.0) 


  


 


 


Hematocrit


  36.3 %


(37.0-47.0) 35.2 %


(37.0-47.0) 


  


 


 


Mean Corpuscular Volume 104 FL (80-99)  103 FL (80-99)   


 


Mean Corpuscular Hemoglobin


  33.3 PG


(27.0-31.0) 34.1 PG


(27.0-31.0) 


  


 


 


Mean Corpuscular Hemoglobin


Concent 32.1 G/DL


(32.0-36.0) 33.1 G/DL


(32.0-36.0) 


  


 


 


Red Cell Distribution Width


  11.4 %


(11.6-14.8) 11.1 %


(11.6-14.8) 


  


 


 


Platelet Count


  214 K/UL


(150-450) 222 K/UL


(150-450) 


  


 


 


Mean Platelet Volume


  6.3 FL


(6.5-10.1) 6.6 FL


(6.5-10.1) 


  


 


 


Neutrophils (%) (Auto)


   % (45.0-75.0) 


  51.5 %


(45.0-75.0) 


  


 


 


Lymphocytes (%) (Auto)


   % (20.0-45.0) 


  36.5 %


(20.0-45.0) 


  


 


 


Monocytes (%) (Auto)


   % (1.0-10.0) 


  8.6 %


(1.0-10.0) 


  


 


 


Eosinophils (%) (Auto)


   % (0.0-3.0) 


  1.6 %


(0.0-3.0) 


  


 


 


Basophils (%) (Auto)


   % (0.0-2.0) 


  1.8 %


(0.0-2.0) 


  


 


 


Differential Total Cells


Counted 100 


  


  


  


 


 


Neutrophils % (Manual) 56 % (45-75)    


 


Lymphocytes % (Manual) 34 % (20-45)    


 


Monocytes % (Manual) 7 % (1-10)    


 


Eosinophils % (Manual) 3 % (0-3)    


 


Basophils % (Manual) 0 % (0-2)    


 


Band Neutrophils 0 % (0-8)    


 


Platelet Estimate Adequate    


 


Platelet Morphology Normal    


 


Sodium Level


  142 MMOL/L


(136-145) 142 MMOL/L


(136-145) 


  


 


 


Potassium Level


  3.7 MMOL/L


(3.5-5.1) 3.9 MMOL/L


(3.5-5.1) 


  


 


 


Chloride Level


  108 MMOL/L


() 109 MMOL/L


() 


  


 


 


Carbon Dioxide Level


  30 MMOL/L


(21-32) 26 MMOL/L


(21-32) 


  


 


 


Anion Gap


  4 mmol/L


(5-15) 7 mmol/L


(5-15) 


  


 


 


Blood Urea Nitrogen


  4 mg/dL (7-18) 


  13 mg/dL


(7-18) 


  


 


 


Creatinine


  1.2 MG/DL


(0.55-1.30) 1.2 MG/DL


(0.55-1.30) 


  


 


 


Estimat Glomerular Filtration


Rate  mL/min (>60) 


   mL/min (>60) 


  


  


 


 


Glucose Level


  136 MG/DL


() 128 MG/DL


() 


  


 


 


Calcium Level


  9.0 MG/DL


(8.5-10.1) 9.4 MG/DL


(8.5-10.1) 


  


 








Height (Feet):  5


Height (Inches):  4.00


Weight (Pounds):  189


Objective





PHYSICAL EXAMINATION:


VITAL SIGNS:  Reviewed. 


HEENT:  PERRLA.


NECK:  Supple.  No lymphadenopathy.


CHEST:  Clear to auscultation.


CARDIOVASCULAR:  Regular rate and rhythm.  No murmurs or extra sounds.


GASTROINTESTINAL:  Diffuse abdominal pain.  Abd is soft


EXTREMITIES:  A 1+ edema.  Reflexes equal in both sides.  Moving all four 

extremities.











Thomas Davis MD Jul 26, 2019 08:28

## 2019-07-26 NOTE — GENERAL PROGRESS NOTE
Assessment/Plan


Status:  stable, progressing


Assessment/Plan:


Assessment


- Acute onset epigastric pain of unclear etiology (? viral , ? gastritis, ? 

constipation)


- symptoms not similar to LLQ pain, associated with diverticulitis in the past


- chronic recurrent abdominal pain


- h/o Renal cell CA





Recommendations


- PRN laxative


- PPI


- follow symptoms for now


- cardiology evaluation


- d/c planning per PMD





Subjective


Allergies:  


Coded Allergies:  


     TRAMADOL (Verified  Allergy, Intermediate, 4/4/18)


 TREMORS AND VOMITING


     MORPHINE (Unverified  Allergy, Unknown, 7/23/19)


     CODEINE (Verified  Adverse Reaction, Mild, 1/25/13)


 nausea


Uncoded Allergies:  


     CODINE (Allergy, Unknown, 7/23/19)


Subjective


Feels better


less abd pain


tolerating PO


for stress test today





Objective





Last 24 Hour Vital Signs








  Date Time  Temp Pulse Resp B/P (MAP) Pulse Ox O2 Delivery O2 Flow Rate FiO2


 


7/26/19 16:00  57      


 


7/26/19 16:00 96.7 78 18 157/75 (102) 100   


 


7/26/19 12:00 97.9 57 18 165/73 (103) 96   


 


7/26/19 12:00  57      


 


7/26/19 08:47      Room Air  


 


7/26/19 08:36  56  129/83    


 


7/26/19 08:00  52      


 


7/26/19 08:00 97.2 52 18 129/83 (98) 100   


 


7/26/19 04:14    163/85    


 


7/26/19 04:00 98.0 74 17 163/85 (111) 97   


 


7/26/19 04:00  66      


 


7/26/19 00:00  55      


 


7/26/19 00:00 97.7 60 18 155/69 (97) 97   


 


7/25/19 21:00      Room Air  

















Intake and Output  


 


 7/25/19 7/26/19





 19:00 07:00


 


Intake Total 430 ml 470 ml


 


Balance 430 ml 470 ml


 


  


 


Intake Oral 360 ml 120 ml


 


IV Total 70 ml 350 ml


 


# Voids 3 3








Laboratory Tests


7/26/19 06:06: 


White Blood Count 3.7L, Red Blood Count 3.42L, Hemoglobin 11.6L, Hematocrit 

35.2L, Mean Corpuscular Volume 103H, Mean Corpuscular Hemoglobin 34.1H, Mean 

Corpuscular Hemoglobin Concent 33.1, Red Cell Distribution Width 11.1L, 

Platelet Count 222, Mean Platelet Volume 6.6, Neutrophils (%) (Auto) 51.5, 

Lymphocytes (%) (Auto) 36.5, Monocytes (%) (Auto) 8.6, Eosinophils (%) (Auto) 

1.6, Basophils (%) (Auto) 1.8, Sodium Level 142, Potassium Level 3.9, Chloride 

Level 109H, Carbon Dioxide Level 26, Anion Gap 7, Blood Urea Nitrogen 13, 

Creatinine 1.2, Estimat Glomerular Filtration Rate , Glucose Level 128H, 

Calcium Level 9.4


Height (Feet):  5


Height (Inches):  4.00


Weight (Pounds):  189


Objective


WD obese AA woman


NCAT


supple


CTA


RR


abd soft, Mild Epigastric TTP


no edema


non focal











Blanca Couch MD Jul 26, 2019 20:16

## 2019-07-26 NOTE — NUR
NURSE NOTES:

Received report from Gina RN in bed resting. Denies any pain. No signs of acute distress 
noted. Respiration even and unlabored. Patient is on room air. No sob noted. IV on left FA 
patent and intact on NS @70cc/hr. Capillary refill <3sec. Skin is intact. Call light is 
within easy reach, bed in lowest position, brakes engaged for safety and bed alarm on. 
Patient is NPO post midnight, no caffeine, no chocolate, explained to patient about the 
stress test today. All needs attended and met. Will continue with the plan of care.

## 2019-07-26 NOTE — INFECTIOUS DISEASES PROG NOTE
Assessment/Plan


Assessment/Plan


IMPRESSION: 


nausea, and vomiting, likely gastroenteritis.


Diabetes mellitus type 2, 


hypertension, COPD,


history of renal cell cancer, and right nephrectomy.





RECOMMENDATION:  


Observe off antibiotic





Subjective


ROS Limited/Unobtainable:  No


Constitutional:  Reports: no symptoms


Respiratory:  Reports: no symptoms


Cardiovascular:  Reports: chest pain


Gastrointestinal/Abdominal:  Reports: other - vague pain


Allergies:  


Coded Allergies:  


     TRAMADOL (Verified  Allergy, Intermediate, 4/4/18)


 TREMORS AND VOMITING


     MORPHINE (Unverified  Allergy, Unknown, 7/23/19)


     CODEINE (Verified  Adverse Reaction, Mild, 1/25/13)


 nausea


Uncoded Allergies:  


     CODINE (Allergy, Unknown, 7/23/19)





Objective


Vital Signs





Last 24 Hour Vital Signs








  Date Time  Temp Pulse Resp B/P (MAP) Pulse Ox O2 Delivery O2 Flow Rate FiO2


 


7/26/19 08:47      Room Air  


 


7/26/19 08:36  56  129/83    


 


7/26/19 08:00  52      


 


7/26/19 08:00 97.2 52 18 129/83 (98) 100   


 


7/26/19 04:14    163/85    


 


7/26/19 04:00 98.0 74 17 163/85 (111) 97   


 


7/26/19 04:00  66      


 


7/26/19 00:00  55      


 


7/26/19 00:00 97.7 60 18 155/69 (97) 97   


 


7/25/19 21:00      Room Air  


 


7/25/19 20:00 97.7 64 18 156/91 (112) 98   


 


7/25/19 20:00  59      


 


7/25/19 16:00  59      


 


7/25/19 16:00 99.1 61 16 122/67 (85) 99   








Height (Feet):  5


Height (Inches):  4.00


Weight (Pounds):  189


General Appearance:  no acute distress


HEENT:  mucous membranes moist


Respiratory/Chest:  lungs clear


Cardiovascular:  normal rate


Abdomen:  soft, non tender


Extremities:  no edema


Neurologic/Psychiatric:  alert, oriented x 3, responsive





Laboratory Tests








Test


  7/26/19


06:06


 


White Blood Count


  3.7 K/UL


(4.8-10.8)  L


 


Red Blood Count


  3.42 M/UL


(4.20-5.40)  L


 


Hemoglobin


  11.6 G/DL


(12.0-16.0)  L


 


Hematocrit


  35.2 %


(37.0-47.0)  L


 


Mean Corpuscular Volume


  103 FL (80-99)


H


 


Mean Corpuscular Hemoglobin


  34.1 PG


(27.0-31.0)  H


 


Mean Corpuscular Hemoglobin


Concent 33.1 G/DL


(32.0-36.0)


 


Red Cell Distribution Width


  11.1 %


(11.6-14.8)  L


 


Platelet Count


  222 K/UL


(150-450)


 


Mean Platelet Volume


  6.6 FL


(6.5-10.1)


 


Neutrophils (%) (Auto)


  51.5 %


(45.0-75.0)


 


Lymphocytes (%) (Auto)


  36.5 %


(20.0-45.0)


 


Monocytes (%) (Auto)


  8.6 %


(1.0-10.0)


 


Eosinophils (%) (Auto)


  1.6 %


(0.0-3.0)


 


Basophils (%) (Auto)


  1.8 %


(0.0-2.0)


 


Sodium Level


  142 MMOL/L


(136-145)


 


Potassium Level


  3.9 MMOL/L


(3.5-5.1)


 


Chloride Level


  109 MMOL/L


()  H


 


Carbon Dioxide Level


  26 MMOL/L


(21-32)


 


Anion Gap


  7 mmol/L


(5-15)


 


Blood Urea Nitrogen


  13 mg/dL


(7-18)


 


Creatinine


  1.2 MG/DL


(0.55-1.30)


 


Estimat Glomerular Filtration


Rate  mL/min (>60)  


 


 


Glucose Level


  128 MG/DL


()  H


 


Calcium Level


  9.4 MG/DL


(8.5-10.1)











Current Medications








 Medications


  (Trade)  Dose


 Ordered  Sig/George


 Route


 PRN Reason  Start Time


 Stop Time Status Last Admin


Dose Admin


 


 Acetaminophen


  (Tylenol)  650 mg  Q6H  PRN


 ORAL


 Mild Pain/Temp > 100.5  7/23/19 15:45


 8/22/19 15:44  7/23/19 16:44


 


 


 Acetaminophen/


 Hydrocodone Bitart


  (Norco 5/325)  1 tab  Q8H  PRN


 ORAL


 For Pain  7/23/19 19:45


 7/30/19 19:44  7/25/19 03:47


 


 


 Aspirin


  (Ecotrin)  81 mg  DAILY


 ORAL


   7/24/19 09:00


 8/23/19 08:59  7/26/19 08:35


 


 


 Clonidine HCl


  (Catapres tab)  0.2 mg  Q2H  PRN


 ORAL


 For High Blood Pressure  7/23/19 16:00


 8/22/19 15:59  7/26/19 04:14


 


 


 Diazepam


  (Valium)  5 mg  BIDPRN  PRN


 ORAL


 ANXIETY  7/23/19 12:30


 7/30/19 12:29   


 


 


 Dicyclomine HCl


  (Bentyl)  10 mg  Q6H  PRN


 ORAL


 For Pain  7/23/19 18:00


 8/22/19 17:59   


 


 


 Diltiazem HCl


  (Cardizem CD)  240 mg  DAILY


 ORAL


   7/23/19 12:30


 8/22/19 12:29  7/26/19 08:36


 


 


 Gabapentin


  (Neurontin)  100 mg  TWICE A  DAY


 ORAL


   7/23/19 18:00


 8/22/19 17:59  7/26/19 08:35


 


 


 Heparin Sodium


  (Porcine)


  (Heparin 5000


 units/ml)  5,000 units  EVERY 12  HOURS


 SUBQ


   7/23/19 21:00


 8/22/19 20:59  7/26/19 08:37


 


 


 Hydrochlorothiazide


  (Hydrodiuril)  25 mg  DAILY


 ORAL


   7/23/19 12:30


 8/22/19 12:29  7/26/19 08:35


 


 


 Ondansetron HCl


  (Zofran)  4 mg  Q6H  PRN


 IVP


 Nausea & Vomiting  7/23/19 15:45


 8/22/19 15:44  7/25/19 17:54


 


 


 Regadenoson


  (Lexiscan)  0.4 mg  ONCE  PRN


 IV


 Stress Test  7/25/19 10:45


 7/26/19 23:59   


 


 


 Sodium Chloride  1,000 ml @ 


 70 mls/hr  [id number]


 IV


   7/23/19 12:15


 8/22/19 12:14  7/26/19 13:57


 

















Leonardo Jacob MD Jul 26, 2019 14:21

## 2019-07-26 NOTE — NUR
NURSE NOTES:

Received report from Tierney RN, pt. in bed awake, A/O x's4- able to make needs known, pt. is 
on cardiac monitoring, no signs or symptoms of acute cardiac or respiratory distress noted, 
pt. appears to be sating well on room air- no distress noted, talking on phone, pt. appears 
to be clean and dry, bed in lowest position and call light within easy reach, bed alarm on, 
side rails up x's3 and safety brakes engaged, LFA 20g IV running NS at 70mls/hr-IV Intact 
and patent, safety measures continued, will continue with plan of care.

## 2019-07-26 NOTE — DIAGNOSTIC IMAGING REPORT
Indications: 74-year-old female with chest pain

 

Technique: Single day single isotope protocol utilized. Initially, resting images

obtained using IV administration 10.9 millicuries 99M technetium Myoview.

Subsequently, patient underwent  lexiscan stress testing. See cardiology report for

details. During Lexiscan infusion, IV administration 30.2 mCi 99 M technetium

Myoview. SPECT and planar images obtained. SPECT images gated to 8 phases of the

cardiac cycle were also obtained, and reformatted into cine images for evaluation of

ejection fraction.

 

Comparison: 9/13/2011

 

Findings: Per cardiology report, patient experienced no symptoms during the infusion.

Per cardiology report, resting EKG demonstrates normal sinus rhythm with evidence of

anterolateral ischemia. No significant ST-T wave changes noted during the infusion.

There were some premature ventricular complexes noted.  Imaging demonstrates normal

poststress perfusion. No fixed nor reversible post stress perfusion defects are

demonstrated. Normal cardiac chamber size. Calculated post stress ejection fraction

56%. Gated images demonstrate no evidence of focal wall motion abnormality

 

Compared to the prior exam, there is no significant interim change

 

Impression: Nonischemic clinical response to pharmacologic stress, per cardiology

report

 

Nonischemic electrocardiographic response to pharmacologic stress, per cardiology

report

 

No imaging findings to suggest ischemia, at level of stress achieved.

 

Calculated post stress ejection fraction 56%

## 2019-07-26 NOTE — CARDIAC ELECTROPHYSIOLOGY PN
Assessment/Plan


Assessment/Plan


1. Accelerated hypertension.  On Cardizem  mg daily and 

hydrochlorothiazide 25 mg daily.





2. Abnormal electrocardiogram and epigastric pain. Ruled out for MI . Ef 50%. 

Nuclear stress test results from today is pending





3. Hyperlipidemia on Crestor 20 mg daily.


4. Epigastric pain.  The patient underwent abdominopelvic CT scan that


showed no definite acute abnormality.  Further evaluation by Dr. Couch.





5. Renal cell carcinoma, status post nephrectomy that was completed 

approximately 1 month ago FU Dr Blanco.





JOHN RN





Subjective


Subjective


Remained in SR. Still occasional CP.Had nuclear stress test. Results pending





Objective





Last 24 Hour Vital Signs








  Date Time  Temp Pulse Resp B/P (MAP) Pulse Ox O2 Delivery O2 Flow Rate FiO2


 


7/26/19 12:00 97.9 57 18 165/73 (103) 96   


 


7/26/19 12:00  57      


 


7/26/19 08:47      Room Air  


 


7/26/19 08:36  56  129/83    


 


7/26/19 08:00  52      


 


7/26/19 08:00 97.2 52 18 129/83 (98) 100   


 


7/26/19 04:14    163/85    


 


7/26/19 04:00 98.0 74 17 163/85 (111) 97   


 


7/26/19 04:00  66      


 


7/26/19 00:00  55      


 


7/26/19 00:00 97.7 60 18 155/69 (97) 97   


 


7/25/19 21:00      Room Air  


 


7/25/19 20:00 97.7 64 18 156/91 (112) 98   


 


7/25/19 20:00  59      


 


7/25/19 16:00  59      


 


7/25/19 16:00 99.1 61 16 122/67 (85) 99   

















Intake and Output  


 


 7/25/19 7/26/19





 19:00 07:00


 


Intake Total 430 ml 470 ml


 


Balance 430 ml 470 ml


 


  


 


Intake Oral 360 ml 120 ml


 


IV Total 70 ml 350 ml


 


# Voids 3 3











Laboratory Tests








Test


  7/26/19


06:06


 


White Blood Count


  3.7 K/UL


(4.8-10.8)  L


 


Red Blood Count


  3.42 M/UL


(4.20-5.40)  L


 


Hemoglobin


  11.6 G/DL


(12.0-16.0)  L


 


Hematocrit


  35.2 %


(37.0-47.0)  L


 


Mean Corpuscular Volume


  103 FL (80-99)


H


 


Mean Corpuscular Hemoglobin


  34.1 PG


(27.0-31.0)  H


 


Mean Corpuscular Hemoglobin


Concent 33.1 G/DL


(32.0-36.0)


 


Red Cell Distribution Width


  11.1 %


(11.6-14.8)  L


 


Platelet Count


  222 K/UL


(150-450)


 


Mean Platelet Volume


  6.6 FL


(6.5-10.1)


 


Neutrophils (%) (Auto)


  51.5 %


(45.0-75.0)


 


Lymphocytes (%) (Auto)


  36.5 %


(20.0-45.0)


 


Monocytes (%) (Auto)


  8.6 %


(1.0-10.0)


 


Eosinophils (%) (Auto)


  1.6 %


(0.0-3.0)


 


Basophils (%) (Auto)


  1.8 %


(0.0-2.0)


 


Sodium Level


  142 MMOL/L


(136-145)


 


Potassium Level


  3.9 MMOL/L


(3.5-5.1)


 


Chloride Level


  109 MMOL/L


()  H


 


Carbon Dioxide Level


  26 MMOL/L


(21-32)


 


Anion Gap


  7 mmol/L


(5-15)


 


Blood Urea Nitrogen


  13 mg/dL


(7-18)


 


Creatinine


  1.2 MG/DL


(0.55-1.30)


 


Estimat Glomerular Filtration


Rate  mL/min (>60)  


 


 


Glucose Level


  128 MG/DL


()  H


 


Calcium Level


  9.4 MG/DL


(8.5-10.1)








Objective





HEAD AND NECK:  No JVD or carotid bruits.


LUNGS:  Clear.


CARDIOVASCULAR:  Regular S1 and S2 with no gallop or murmur.


ABDOMEN:  Soft.


EXTREMITIES:  No pitting edema.











Mario Verma MD Jul 26, 2019 15:28

## 2019-07-27 NOTE — NUR
NURSE NOTES: Discharge instructions printed and signed. All questions answered. Belongings 
verified and list signed. Cardiac monitor, IV, and wrist band removed. Patient is in stable 
condition, VSS. Pt discharged safely via wheelchair with friend to private vehicle.

## 2019-07-27 NOTE — NUR
NURSE NOTES: Pt cleared by Dr. Couch. Left message with Dr. Davis about discharging 
patient; awaiting response.

## 2019-07-27 NOTE — GENERAL PROGRESS NOTE
Assessment/Plan


Status:  stable, progressing


Assessment/Plan:


Assessment


- Acute onset epigastric pain of unclear etiology (? viral , ? gastritis, ? 

constipation)


- symptoms not similar to LLQ pain, associated with diverticulitis in the past


- chronic recurrent abdominal pain


- h/o Renal cell CA





Recommendations


- PRN laxative


- PPI


- follow symptoms for now


- cardiology f/u


- d/c planning per PMD





Subjective


Allergies:  


Coded Allergies:  


     TRAMADOL (Verified  Allergy, Intermediate, 4/4/18)


 TREMORS AND VOMITING


     MORPHINE (Unverified  Allergy, Unknown, 7/23/19)


     CODEINE (Verified  Adverse Reaction, Mild, 1/25/13)


 nausea


Uncoded Allergies:  


     CODINE (Allergy, Unknown, 7/23/19)


Subjective


Feels better


less abd pain


tolerating PO


for discharge today





Objective





Last 24 Hour Vital Signs








  Date Time  Temp Pulse Resp B/P (MAP) Pulse Ox O2 Delivery O2 Flow Rate FiO2


 


7/27/19 12:00 98.1 69 21 158/79 (105) 98   


 


7/27/19 09:00      Room Air  


 


7/27/19 08:21  69  158/79    


 


7/27/19 08:00  81      


 


7/27/19 08:00 98.1 69 21 158/79 (105) 98   


 


7/27/19 04:00 97.9 52 18 129/66 (87) 98   


 


7/27/19 04:00  53      


 


7/27/19 00:00 97.7 58 18 115/50 (71) 96   


 


7/27/19 00:00  51      


 


7/26/19 21:00      Room Air  


 


7/26/19 20:30 96.7       


 


7/26/19 20:00      Room Air  


 


7/26/19 20:00 98.1 60 18 140/85 (103) 96   


 


7/26/19 20:00  60      


 


7/26/19 16:00  57      


 


7/26/19 16:00 96.7 78 18 157/75 (102) 100   

















Intake and Output  


 


 7/26/19 7/27/19





 19:00 07:00


 


Intake Total 210 ml 816 ml


 


Balance 210 ml 816 ml


 


  


 


Intake Oral 140 ml 


 


IV Total 70 ml 816 ml


 


# Voids 3 2








Height (Feet):  5


Height (Inches):  4.00


Weight (Pounds):  189


Objective


WD obese AA woman


NCAT


supple


CTA


RR


abd soft, Mild Epigastric TTP


no edema


non focal











Khorrami,Payman MD Jul 27, 2019 15:35

## 2019-07-27 NOTE — NUR
NURSE NOTES: Report received from SIMON Botello. Pt shows no signs of distress. A+Ox4, denies 
pain and SOB. Respirations are even and unlabored on room air. IV site is patent and intact. 
Bed is at lowest position, brakes engaged, siderails x2, bed alarm on, and call light within 
reach. Pt is in stable condition at this time; will continue to monitor.

## 2019-07-27 NOTE — PROGRESS NOTE
DATE:  07/26/2019

SUBJECTIVE:  The patient is doing well, able to answer the questions.  She

has anxiety.  No suicidal or homicidal ideation.



MENTAL STATUS EXAMINATION:  The patient is alert and oriented times self,

place, and situation she is in.  Mood is anxious.  Affect is constricted.

Congruent mood.  Thought process is linear.  Thought content, no suicidal

or homicidal ideation.



ASSESSMENT:  Anxiety disorder.



PLAN:

1. We will continue the Valium.

2. Provide the patient with reality orientation and supportive

therapy.









  ______________________________________________

  Sunil Estrada M.D.





DR:  DEJA

D:  07/27/2019 00:00

T:  07/27/2019 00:48

JOB#:  [id number]

CC:

## 2019-07-27 NOTE — NUR
DISCHARGE PLANNING: NOTE 



ACTIVE DC ORDER 



CLEARANCE OBTAINED FROM DR BERE PERRY FOR DC CLEARED WITH BEDSIDE NURSE 



BEDSIDE NURSE TO CONTACT DR ANDREWS FOR DC MEDS

## 2019-07-27 NOTE — NUR
CASE MANAGEMENT:REVIEW



7/27/19



SI: DIVERTICULITIS. ACCELERATED HTN

UTI. ABD PAIN. H/O RENAL CELL CARCINOMA

T 98.1 HR 69 RR 21 B/P 158/79 SATS 98% ON RA 

NO LABS TODAY 



IS: IV ROCEPHIN Q24H

ASA PO QD

HEPARIN SUBQ Q12H

NORCO PO Q8HRS PRN

NEURONTIN PO BID

CARDIZEM PO QD

HCTZ PO QD



**: TELEMETRY STATUS



DCP: FROM HOME

## 2019-07-29 NOTE — PROGRESS NOTE
DATE:  07/27/2019

This is a late entry.



SUBJECTIVE:  The patient is calmer.  Able to answer the questions.  The

patient is agitated and anxious.



MENTAL STATUS EXAMINATION:  Alert and oriented times self, place, and

situation.  Mood is anxious.  Affect is constricted.  Congruent mood.

Thought process is concrete.  Thought content, no suicidal or homicidal

ideation.



ASSESSMENT:  Anxiety disorder.



PLAN:

1. We will continue with current medications.

2. Provide the patient with reality orientation and supportive

therapy.









  ______________________________________________

  Sunil Estrada M.D.





DR:  NOE

D:  07/28/2019 23:56

T:  07/29/2019 00:23

JOB#:  [id number]

CC:

## 2019-07-29 NOTE — DISCHARGE SUMMARY
Discharge Summary


Discharge Summary


_


DATE OF ADMISSION: 7/23/2019





DATE OF DISCHARGE:  7/27/2019








DISCHARGED BY: Dr. Deluca





REASON FOR ADMISSION: 


74 years old female with past medical history of COPD/asthma, hypertension, 

neuropathy, ulcerative colitis, seizure disorder , renal cancer, diabetes 

mellitus,  presented with epigastric pain .


Patient had bowel movement , but no improvement in abdominal discomfort.  


Pain described as located in epigastric region with radiation to the back.


Vital signs revealed elevated blood pressure 182/102 , no fevers.  


Laboratory work-up revealed no leukocytosis,  stable hemoglobin and  hematocrit.


Stable electrolytes.


BUN 21, creatinine 1.3.


Glucose 126.


AST 65, ALT 42.


Troponin 0 0.001.  EKG revealed sinus rhythm with T wave inversion  


Urinalysis revealed no evidence of UTI.


CT of the abdomen and pelvis was done without IV contrast and revealed no 

definite acute abnormality.





CONSULTANTS:


cardiologist Dr. Jimenez


ID specialist Dr. Hdz


GI specialist Dr. Couch


hematologist/oncologist Dr. Davis


psychiatrist 


 


[facility] COURSE: 


Patient admitted to telemetry floor.  


Cardiologist consult was requested   due to abnormal EKG along with  history of 

hypertension and hyperlipidemia, and  accelerated hypertension on presentation.

  


Per cardiologist , antihypertensive medication were resumed , including 

Cardizem CD and hydrochlorothiazide.  


PRN antihypertensives were on board as needed.  


Serial troponin  were negative. 


Echocardiogram revealed   ejection fraction of 50% with low normal left 

ventricular systolic function and wall motion.  No left ventricular 

hypertrophy.  


Right ventricular systolic pressure of 23.  


Nuclear stress test  was nonischemic.  Calculated ejection fraction 56%.


Patient was on antiplatelet therapy with aspirin and statin.  


DVT prophylaxis provided.  





GI specialist followed .  Per GI specialist,  epigastric pain was  of unclear 

etiology : viral versus gastritis versus constipation h.


Symptoms  were not similar to left lower quadrant pain associated with 

diverticulitis in the past.  


Patient reported chronic recurrent abdominal pain in the setting of history of 

renal cell carcinoma.  


Bowel regimen instituted.  


GI prophylaxis with   PPI provided.  


Pain management was addressed.  


Patient was able to tolerate diet.





Oncologist followed.  


Patient had right nephrectomy  recently.  Pathology was reviewed  by  

oncologist.


Patient did not require further treatment.  No evidence of distant metastasis.  


No evidence of positive margins noted on the tumor report.   


Hemoglobin  and hematocrit were closely monitored  with goal to keep hemoglobin 

above 7 .


Hemoglobin and hematocrit remained   stable .


Prior to discharge  [id number] , hematocrit 35.2.


Renal parameters electrolytes were closely monitored and remained stable.





ID specialist followed.  


Per infectious disease specialist , nausea and vomiting were likely due to 

gastroenteritis.  


Patient had  no leukocytosis , no fever .  


ID specialist  recommended to keep patient off antibiotic.





Psychiatrist followed.  Patient was continue on current medication for anxiety.

  


Reality orientation and  supportive therapy provided.


Patient clinically stabilized and was ready for discharge home.





FINAL DIAGNOSES: 


Accelerated hypertension - resolved


Abnormal electrocardiogram   


Acute onset of epigastric pain of unclear etiology : viral versus gastritis 

versus constipation


Likely gastroenteritis


Chronic recurrent abdominal pain


Renal cell carcinoma , status post right nephrectomy 


Anxiety disorder


Anemia  of chronic disease





DISCHARGE MEDICATIONS:


See Medication Reconciliation list.





DISCHARGE INSTRUCTIONS:


Patient was discharged home .


Follow up with primary care provider in one week.











I have been assigned to dictate discharge summary for this account. 


I was not involved in the patient's management.











Chikis Martin NP Jul 29, 2019 10:13

## 2019-09-26 NOTE — NUR
NURSE NOTES:

Pt transferred from [facility] via [facility] with 2EMTS. Alert and oriented x4 and able to 
make needs known. Bed in lowest position and locked.  c/O PAIN 2/10 but no pain meds 
required at this time. Dr. Garcia called for admission orders. Side rails x2 up for safety. 
Report received from Yesica Charge nurse. Call light within easy reach. Will continue to 
plan of care.

## 2019-09-26 NOTE — NUR
NURSE NOTES:

Received report from SIMON Navarro, patient in stable condition,AOx4, complain of abdominal pain, 
ambulatory, on RA, IV site on right AC G22, asymptomatic, intact, patent, bed low&locked, 
side rails upx2,call light within reach,will continue to monitor and reassess. Family at 
bedside

## 2019-09-27 NOTE — CONSULTATION
DATE OF CONSULTATION:  09/27/2019

CARDIOLOGY CONSULTATION



REASON FOR CONSULTATION:  Chest pain and back pain.



HISTORY OF PRESENT ILLNESS:  The patient is a 74-year-old 

lady with history of hypertension, COPD, ulcerative colitis, seizure

disorder, and history of diabetes presented to the emergency room with

epigastric pain.  The patient was admitted about two months ago with

similar symptoms.  The patient at that time underwent a nuclear stress

test which showed no evidence of ischemia.  At that time also patient had

CT of abdomen and pelvis without IV contrast which showed no acute

abnormality.  The patient again presented with increasing abdominal pain

as well as chest pain with radiation to her back.



REVIEW OF SYSTEMS:  Review of systems was negative other than what is

mentioned in the history of present illness.



PAST MEDICAL HISTORY:  As mentioned above.



FAMILY HISTORY:  Noncontributory.



SOCIAL HISTORY:  She lives at home.  Does not smoke or drink

alcohol.



PHYSICAL EXAMINATION:

VITAL SIGNS:  Blood pressure 132/73, pulse 60, respirations 18, and

temperature is 97.5.

HEAD AND NECK:  Shows no JVD.

LUNGS:  Clear.

CARDIOVASCULAR:  Regular S1 and S2 with no gallop or murmur.

ABDOMEN:  Soft.

EXTREMITIES:  No pitting edema.



LABORATORY AND DIAGNOSTIC DATA:  Labs show white count of 5.9, hemoglobin

13.5, hematocrit of 40, platelet count 233.  Sodium is 139, potassium 3.9,

BUN ______ , creatinine 1.4, glucose of 105.  Troponin is negative.



ASSESSMENT AND PLAN:

1. Chest pain.  Troponin is negative.  In view of hypertension and the

fact she states that the pain radiates to her back, this patient had a

nuclear stress test which was negative couple months ago.  We will proceed

with CT angiogram to rule out pulmonary embolism or dissection.

2. Hypertension.  Resume Cardizem  mg daily and

hydrochlorothiazide 25 mg daily.

3. Hyperlipidemia on Crestor 20 mg daily.

4. Epigastric pain.  Further evaluation by Dr. Couch.

5. History of renal cell carcinoma status post nephrectomy, followed by

Dr. Davis.



Thank you very much for allowing me to participate in the care of this

patient.  Please do not hesitate to contact me for any questions regarding

my evaluation.



Sincerely,









  ______________________________________________

  Mario Verma M.D.





DR:  [place]

D:  09/27/2019 14:00

T:  09/27/2019 16:47

JOB#:  [id number]

CC:

## 2019-09-27 NOTE — NUR
NURSE NOTES:

Pt in bed in low position, bed alarm on, 2 rails up and padded for prior history of SZ's, 
HOB in semi fowlers, pt reports chest pain of 7 and MS given by prior night shift nurse, 
before getting medication I assessed her chest pain and palpated both sides of chest and 
pain seems to becoming from muscle in the middle of her chest, pt stated she was doing 
choirs and the next day started to feel pain, Pt is alert and Ox4, IV patent and 
asymptomatic, no s/s of distress or sob noted.

## 2019-09-27 NOTE — NUR
NURSE NOTES:  /85, clonidine 0.1mg po prn administered.  Patient also c/o nausea and 
epigastric pain, 7/10.  Administered morphine 2mg ivp prn and zofran 4mg ivp prn.

## 2019-09-27 NOTE — GENERAL PROGRESS NOTE
Assessment/Plan


Assessment/Plan:


Assessment


- acute central abd pain, vomiting - ? vial gastroenteritis


- h/o renal carcinoma  


- HTN


- diverticulosis, h/o diverticulitis


- high cholesterol


- SIBO (+)





Recommendations


- po as tolerated


- symptomatic Rx


- follw exam


- further w/u if symptoms persist





Subjective


Allergies:  


Coded Allergies:  


     TRAMADOL (Verified  Allergy, Intermediate, 4/4/18)


 TREMORS AND VOMITING


     CODEINE (Verified  Adverse Reaction, Mild, 1/25/13)


 nausea





Objective





Last 24 Hour Vital Signs








  Date Time  Temp Pulse Resp B/P (MAP) Pulse Ox O2 Delivery O2 Flow Rate FiO2


 


9/27/19 11:39  62      


 


9/27/19 08:09      Room Air  


 


9/27/19 07:51 97.5       


 


9/27/19 07:48  60      


 


9/27/19 04:00 97.5 82 18 132/73 (92) 95   


 


9/27/19 04:00  68      


 


9/27/19 00:00 97.7 80 18 122/66 (84) 97   


 


9/27/19 00:00  59      


 


9/26/19 21:00      Room Air  


 


9/26/19 20:00 98.2 82 18 127/59 (81) 97   


 


9/26/19 20:00  67      


 


9/26/19 16:32      Room Air  


 


9/26/19 16:05  66      

















Intake and Output  


 


 9/26/19 9/27/19





 19:00 07:00


 


Intake Total 200 ml 


 


Balance 200 ml 


 


  


 


Intake Oral 200 ml 


 


# Voids 1 2


 


# Bowel Movements 11 11








Laboratory Tests


9/27/19 06:26: 


White Blood Count 5.9, Red Blood Count 3.97L, Hemoglobin 13.5, Hematocrit 40.2, 

Mean Corpuscular Volume 101H, Mean Corpuscular Hemoglobin 34.0H, Mean 

Corpuscular Hemoglobin Concent 33.6, Red Cell Distribution Width 10.5L, 

Platelet Count 233, Mean Platelet Volume 6.7, Neutrophils (%) (Auto) 69.1, 

Lymphocytes (%) (Auto) 19.7L, Monocytes (%) (Auto) 9.0, Eosinophils (%) (Auto) 

0.7, Basophils (%) (Auto) 1.5, Sodium Level 139, Potassium Level 3.9, Chloride 

Level 105, Carbon Dioxide Level 26, Anion Gap 8, Blood Urea Nitrogen 18, 

Creatinine 1.4H, Estimat Glomerular Filtration Rate , Glucose Level 105, 

Calcium Level 9.5, Total Bilirubin 1.1H, Direct Bilirubin 0.2, Aspartate Amino 

Transf (AST/SGOT) 34, Alanine Aminotransferase (ALT/SGPT) 38, Alkaline 

Phosphatase 117H, Troponin I 0.000, Total Protein 7.5, Albumin 3.7, Globulin 3.8

, Albumin/Globulin Ratio 1.0, Lipase 65L


Height (Feet):  5


Height (Inches):  4.00


Weight (Pounds):  196











Blanca Couch MD Sep 27, 2019 16:06

## 2019-09-27 NOTE — NUR
CASE MANAGEMENT:REVIEW



74 YR OLD FEMALE TRANSFERRED FROM [facility] ER TO [facility]



SI: ABDOMINAL PAIN

98.2  82  18  127/59  97% ON RA

CR+1.4  TROPONIN(-)



IS: CARDIZEM PO QD

IV MORPHINE Q4HRS PRN

IV ZOFRAN Q6HRS PRN

**: TO TELEMETRY UNIT



PLAN:

CTA CHEST

VENOUS DUPLEX

## 2019-09-27 NOTE — DIAGNOSTIC IMAGING REPORT
Indication: Abdominal pain

 

Comparison:  8/8/2013

 

Single view of the abdomen obtained 

 

Findings:

   

Bowel gas pattern is nonspecific. No mass, ectopic calcifications, or abnormal gas

collections are identified. The bones are unremarkable.

 

Impression: No acute findings

## 2019-09-27 NOTE — NUR
NURSE NOTES:  Patient awake in bed, alert and oriented.  No signs of respiratory distress.  
/78, no c/o pain.

## 2019-09-27 NOTE — HISTORY AND PHYSICAL REPORT
DATE OF ADMISSION:  09/26/2019

HISTORY OF PRESENT ILLNESS:  This is a long-time office patient of [facility].

The patient is admitted from [facility], admitted to telemetry

floor for chest pain that radiated to the back.  It has been going on for

a day.  The patient also complained of epigastric pain as well as

vomiting.  Denies diaphoresis.  Denies shortness of breath.  Also has

history of chronic pain syndrome.  The patient was admitted to rule out

acute coronary syndrome.  The patient denies shortness of breath.  Reports

that the pain was severe, felt like pressure and radiated to the back.  No

diaphoresis and was associated with nausea and epigastric pain as well as

vomiting.  The patient does have some heartburn as well.  Denies

orthopnea.  Denies cough.  Denies cold symptoms.



PAST MEDICAL HISTORY:  Hyperlipidemia, chronic pain syndrome, hypertension,

constipation, diverticulosis, history of diverticulitis, GERD, history of

C. diff colitis, history of kidney cancer, history of ovarian cyst.



PAST SURGICAL HISTORY:  Right partial nephrectomy, appendectomy.



ALLERGIES:  To codeine and tramadol.



MEDICATIONS:  Aspirin, Lipitor, diltiazem, gabapentin, hydralazine,

omeprazole, Crestor.



FAMILY HISTORY:  No history of hypertension or diabetes.



SOCIAL HISTORY:  Denies history of smoking, alcohol, or illicit drugs.



REVIEW OF SYSTEMS:  HEENT:  Denies headaches.  RESPIRATORY:  Denies

shortness of breath.  Denies cough.  CARDIOVASCULAR:  Reports chest pain

for one day.  Does radiate to the back associated with epigastric pain and

heartburn as well as nausea and vomiting.  No orthopnea.

GASTROINTESTINAL:  Reports epigastric pain and vomiting for one day.  No

hematemesis.  No constipation.  EXTREMITIES:  Does have chronic pain

syndrome, generalized.  CENTRAL NERVOUS SYSTEM:  Denies change in vision

or speech pattern.  Feels weak.



PHYSICAL EXAMINATION:

VITAL SIGNS:  Temperature 97.7, pulse is 80, blood pressure is 122/66.

HEENT:  PERRLA.

NECK:  Supple.  No lymphadenopathy.

CHEST:  Clear to auscultation.

CARDIOVASCULAR:  Regular rate and rhythm.  No murmurs or extra sounds.

GASTROINTESTINAL:  Soft.  Epigastric tenderness.  No rebound.  No

organomegaly.  Abdomen is distended.  Positive bowel sounds.

EXTREMITIES:  No edema.  Reflexes equal on both sides.  Moves all four

extremities.

NEUROLOGIC:  Generalized weakness.  Cranial nerves II through XII intact.



LABORATORY DATA:  WBC of 5.9, hemoglobin 13.5, platelets 233.  Sodium 139,

potassium of 3.9, BUN of 18, creatinine 1.4, glucose of 105.  Total

bilirubin of 1.1.  Lipase of 65.  CAT scan report done at [facility], there is

no evidence of diverticulitis, only diverticulosis.  Troponin is

negative.



ASSESSMENT AND PLAN:  Chest pain, rule out acute coronary syndrome.  I have

consulted Dr. Verma.  For abdominal pain, epigastric tenderness,

diverticulosis, I have consulted Dr. Couch and also consulted Dr. Leonardo Jacob to rule out diverticulitis as well as Dr. Ely for

management of the hypertension as well as for the azotemia and Dr. Saenz

is also consulted for the chronic pain syndrome management.









  ______________________________________________

  Jose Alberto Garcia M.D.





DR:  SEAMUS

D:  09/27/2019 12:15

T:  09/27/2019 15:36

JOB#:  [id number]

CC:

## 2019-09-27 NOTE — NUR
NURSE NOTES:  Received patient from Dev MONTES.  Patient in bed, returned from Chest CTA.  On 
room air, no signs of respiratory distress.  Bed in low position, locked, bed alarm on, call 
light within reach.

## 2019-09-27 NOTE — CARDIAC ELECTROPHYSIOLOGY PN
Subjective


Subjective


[id number]





Objective





Last 24 Hour Vital Signs








  Date Time  Temp Pulse Resp B/P (MAP) Pulse Ox O2 Delivery O2 Flow Rate FiO2


 


9/27/19 11:39  62      


 


9/27/19 08:09      Room Air  


 


9/27/19 07:51 97.5       


 


9/27/19 07:48  60      


 


9/27/19 04:00 97.5 82 18 132/73 (92) 95   


 


9/27/19 04:00  68      


 


9/27/19 00:00 97.7 80 18 122/66 (84) 97   


 


9/27/19 00:00  59      


 


9/26/19 21:00      Room Air  


 


9/26/19 20:00 98.2 82 18 127/59 (81) 97   


 


9/26/19 20:00  67      


 


9/26/19 16:32      Room Air  


 


9/26/19 16:05  66      

















Intake and Output  


 


 9/26/19 9/27/19





 19:00 07:00


 


Intake Total 200 ml 


 


Balance 200 ml 


 


  


 


Intake Oral 200 ml 


 


# Voids 1 2


 


# Bowel Movements 11 11











Laboratory Tests








Test


  9/27/19


06:26


 


White Blood Count


  5.9 K/UL


(4.8-10.8)


 


Red Blood Count


  3.97 M/UL


(4.20-5.40)  L


 


Hemoglobin


  13.5 G/DL


(12.0-16.0)


 


Hematocrit


  40.2 %


(37.0-47.0)


 


Mean Corpuscular Volume


  101 FL (80-99)


H


 


Mean Corpuscular Hemoglobin


  34.0 PG


(27.0-31.0)  H


 


Mean Corpuscular Hemoglobin


Concent 33.6 G/DL


(32.0-36.0)


 


Red Cell Distribution Width


  10.5 %


(11.6-14.8)  L


 


Platelet Count


  233 K/UL


(150-450)


 


Mean Platelet Volume


  6.7 FL


(6.5-10.1)


 


Neutrophils (%) (Auto)


  69.1 %


(45.0-75.0)


 


Lymphocytes (%) (Auto)


  19.7 %


(20.0-45.0)  L


 


Monocytes (%) (Auto)


  9.0 %


(1.0-10.0)


 


Eosinophils (%) (Auto)


  0.7 %


(0.0-3.0)


 


Basophils (%) (Auto)


  1.5 %


(0.0-2.0)


 


Sodium Level


  139 MMOL/L


(136-145)


 


Potassium Level


  3.9 MMOL/L


(3.5-5.1)


 


Chloride Level


  105 MMOL/L


()


 


Carbon Dioxide Level


  26 MMOL/L


(21-32)


 


Anion Gap


  8 mmol/L


(5-15)


 


Blood Urea Nitrogen


  18 mg/dL


(7-18)


 


Creatinine


  1.4 MG/DL


(0.55-1.30)  H


 


Estimat Glomerular Filtration


Rate  mL/min (>60)  


 


 


Glucose Level


  105 MG/DL


()


 


Calcium Level


  9.5 MG/DL


(8.5-10.1)


 


Total Bilirubin


  1.1 MG/DL


(0.2-1.0)  H


 


Direct Bilirubin


  0.2 MG/DL


(0.0-0.3)


 


Aspartate Amino Transf


(AST/SGOT) 34 U/L (15-37)


 


 


Alanine Aminotransferase


(ALT/SGPT) 38 U/L (12-78)


 


 


Alkaline Phosphatase


  117 U/L


()  H


 


Troponin I


  0.000 ng/mL


(0.000-0.056)


 


Total Protein


  7.5 G/DL


(6.4-8.2)


 


Albumin


  3.7 G/DL


(3.4-5.0)


 


Globulin 3.8 g/dL  


 


Albumin/Globulin Ratio 1.0 (1.0-2.7)  


 


Lipase


  65 U/L


()  L

















Mario Verma MD Sep 27, 2019 13:58

## 2019-09-27 NOTE — GENERAL PROGRESS NOTE
Assessment/Plan


Assessment/Plan:


(1) Abdominal pain


(2) Diverticulitis 


(3) Lumbar Radiculopathy


(4) Lumbar DDD


Patient to be continued on Morphine as needed


D/w Dr. Saenz and he concurred.





Subjective


Date patient seen:  Sep 27, 2019


Time patient seen:  08:00 - am


Allergies:  


Coded Allergies:  


     TRAMADOL (Verified  Allergy, Intermediate, 4/4/18)


 TREMORS AND VOMITING


     CODEINE (Verified  Adverse Reaction, Mild, 1/25/13)


 nausea


Subjective


Constitutional:  Reports: no symptoms


HEENT:  Reports: no symptoms


Cardiovascular:  Reports: no symptoms


Respiratory:  Reports: no symptoms


Gastrointestinal/Abdominal:  Reports: abdominal pain


Genitourinary:  Reports: no symptoms


Neurologic/Psychiatric:  Reports: no symptoms


Endocrine:  Reports: no symptoms


Hematologic/Lymphatic:  Reports: no symptoms


  


Subjective


Patient is a known patient from prior hospital admission and has been 


readmitted under the care of Dr. Garcia with continued c/o abdominal


and back pain with h/o diverticulitis. She is c/o pain and was started on


Morphine 2mg IV Q4H PRN which has reduced to pain to a tolerable 


level. Waiting to be seen by GI.





Objective





Last 24 Hour Vital Signs








  Date Time  Temp Pulse Resp B/P (MAP) Pulse Ox O2 Delivery O2 Flow Rate FiO2


 


9/27/19 08:09      Room Air  


 


9/27/19 04:00 97.5 82 18 132/73 (92) 95   


 


9/27/19 04:00  68      


 


9/27/19 00:00 97.7 80 18 122/66 (84) 97   


 


9/27/19 00:00  59      


 


9/26/19 21:00      Room Air  


 


9/26/19 20:00 98.2 82 18 127/59 (81) 97   


 


9/26/19 20:00  67      


 


9/26/19 16:32      Room Air  


 


9/26/19 16:05  66      

















Intake and Output  


 


 9/26/19 9/27/19





 18:59 06:59


 


Intake Total 200 ml 


 


Balance 200 ml 


 


  


 


Intake Oral 200 ml 


 


# Voids 1 2


 


# Bowel Movements 11 11








Laboratory Tests


9/27/19 06:26: 


White Blood Count 5.9, Red Blood Count 3.97L, Hemoglobin 13.5, Hematocrit 40.2, 

Mean Corpuscular Volume 101H, Mean Corpuscular Hemoglobin 34.0H, Mean 

Corpuscular Hemoglobin Concent 33.6, Red Cell Distribution Width 10.5L, 

Platelet Count 233, Mean Platelet Volume 6.7, Neutrophils (%) (Auto) 69.1, 

Lymphocytes (%) (Auto) 19.7L, Monocytes (%) (Auto) 9.0, Eosinophils (%) (Auto) 

0.7, Basophils (%) (Auto) 1.5, Sodium Level 139, Potassium Level 3.9, Chloride 

Level 105, Carbon Dioxide Level 26, Anion Gap 8, Blood Urea Nitrogen 18, 

Creatinine 1.4H, Estimat Glomerular Filtration Rate , Glucose Level 105, 

Calcium Level 9.5, Total Bilirubin 1.1H, Direct Bilirubin 0.2, Aspartate Amino 

Transf (AST/SGOT) 34, Alanine Aminotransferase (ALT/SGPT) 38, Alkaline 

Phosphatase 117H, Troponin I 0.000, Total Protein 7.5, Albumin 3.7, Globulin 3.8

, Albumin/Globulin Ratio 1.0, Lipase 65L


Height (Feet):  5


Height (Inches):  4.00


Weight (Pounds):  196


Objective


General Appearance:  no apparent distress, alert


EENT:  PERRL/EOMI, normal ENT inspection


Neck:  normal alignment, supple


Cardiovascular:  normal rate, regular rhythm


Respiratory/Chest:  lungs clear, normal breath sounds


Abdomen:  soft


Neurologic:  alert, oriented x 3


Skin:  warm/dry











Spencer Murphy Sep 27, 2019 08:50

## 2019-09-28 NOTE — NUR
NURSE NOTES:

Patient stable. AOx4. No s/s of distress and no complaints at this time. RR even and 
unlabored on RA.  Side rails up x2, bed locked in lowest position. Will continue to monitor.

## 2019-09-28 NOTE — NUR
NURSE NOTES:  Received patient from Faith MONTES.  Patient in bed, on room air, no signs of 
respiratory distress.  Alert and oriented x4, no c/o pain or nausea.  Bed in low position, 
locked, call light within reach.  Padded side rails.

## 2019-09-28 NOTE — GENERAL PROGRESS NOTE
Assessment/Plan


Problem List:  


(1) chest pain


(2) S/p nephrectomy


ICD Codes:  Z90.5 - Acquired absence of kidney


SNOMED:  72154578, 448692682


(3) Intractable pain


ICD Codes:  R52 - Pain, unspecified


SNOMED:  13607096


(4) Anemia


ICD Codes:  D64.9 - Anemia, unspecified


SNOMED:  532385645


(5) GI problem


ICD Codes:  R19.8 - GI problem


SNOMED:  959470233


(6) HTN (hypertension)


ICD Codes:  I10 - Essential (primary) hypertension


SNOMED:  54271826


(7) Abdominal pain


ICD Codes:  R10.9 - Unspecified abdominal pain


SNOMED:  45356263, 517491534


(8) Chest pain


ICD Codes:  R07.9 - Chest pain, unspecified


SNOMED:  35033908


Status:  progressing


Assessment/Plan:


afebrile


abdominal pain


nausea


no diverticulitis on imaging


does have diverticulosis


negative trop


cp





Subjective


Gastrointestinal/Abdominal:  Reports: abdominal pain


Allergies:  


Coded Allergies:  


     TRAMADOL (Verified  Allergy, Intermediate, 4/4/18)


 TREMORS AND VOMITING


     CODEINE (Verified  Adverse Reaction, Mild, 1/25/13)


 nausea





Objective





Last 24 Hour Vital Signs








  Date Time  Temp Pulse Resp B/P (MAP) Pulse Ox O2 Delivery O2 Flow Rate FiO2


 


9/28/19 15:49  54      


 


9/28/19 12:00 98.2 98 22 140/64 (89) 99   


 


9/28/19 12:00  64      


 


9/28/19 08:57  66  143/61    


 


9/28/19 08:12      Room Air  


 


9/28/19 08:08 98.2 66 22 143/61 (88) 100   


 


9/28/19 07:57  60      


 


9/28/19 04:00 98.2 63 18 115/84 (94) 94   


 


9/28/19 04:00  58      


 


9/28/19 00:00  57      


 


9/28/19 00:00 98.2 66 18 152/81 (104) 96   


 


9/27/19 21:17  60  139/78 (98)    


 


9/27/19 21:00      Room Air  


 


9/27/19 20:00 98.5 69 18  95   


 


9/27/19 20:00    171/85    


 


9/27/19 20:00  63      


 


9/27/19 19:59    171/85 (113)    

















Intake and Output  


 


 9/27/19 9/28/19





 18:59 06:59


 


Intake Total 600 ml 


 


Balance 600 ml 


 


  


 


Intake Oral 600 ml 


 


# Voids 3 3


 


# Bowel Movements 11 11








Height (Feet):  5


Height (Inches):  4.00


Weight (Pounds):  196


Cardiovascular:  normal rate


Respiratory/Chest:  lungs clear


Abdomen:  tender











Jose Alberto Garcia MD Sep 28, 2019 17:38

## 2019-09-28 NOTE — NUR
HAND-OFF: 

Report given to Harrison MONTES.  Patient transferred to [facility], plan of care endorsed.

## 2019-09-28 NOTE — CONSULTATION
History of Present Illness


Present Illness


Allergies:  


Coded Allergies:  


     TRAMADOL (Verified  Allergy, Intermediate, 18)


 TREMORS AND VOMITING


     CODEINE (Verified  Adverse Reaction, Mild, 13)


 nausea





Medication History


Scheduled


Albuterol Sulfate* (Proair Hfa*), 2 PUFFS INH Q4, (Reported)


Aspirin* (Aspir 81*), 81 MG ORAL DAILY, (Reported)


Atorvastatin Calcium* (Lipitor*), 20 MG ORAL BEDTIME, (Reported)


Azelastine/Fluticasone (Dymista Nasal Spray), 2 SPRAYS NS BID, (Reported)


Ceftriaxone Sodium (Ceftriaxone), 1 GM IV DAILY, (Reported)


Ciprofloxacin/Ciprofloxa Hcl (Ciprofloxacin Er 500 Mg Tablet), 500 MG PO Q12HR, 

(Reported)


Cyclobenzaprine Hcl* (Flexeril*), 5 MG ORAL DA, (Reported)


Dexlansoprazole (Dexilant), 60 MG ORAL DAILY, (Reported)


Dicyclomine Hcl* (Dicyclomine Hcl*), 10 MG ORAL EVERY 6 HOURS, (Reported)


Diltiazem Hcl (Diltiazem Er), 240 MG PO DAILY, (Reported)


Diltiazem Hcl* (Cartia Xt*), 240 MG ORAL DAILY, (Reported)


Docusate Sodium* (Docusate Sodium*), 100 MG ORAL TWICE A DAY, (Reported)


Duloxetine Hcl* (Cymbalta*), 30 MG ORAL DAILY, (Reported)


Fesoterodine Fumarate (Toviaz), 4 MG PO BID, (Reported)


Fluticasone/Vilanterol (Breo Ellipta 100-25 Mcg INH), 1 PUFF IH DAILY, (Reported

)


Gabapentin* (Gabapentin*), 100 MG ORAL TWICE A DAY, (Reported)


Hydralazine Hcl* (Hydralazine Hcl*), 25 MG ORAL BID, (Reported)


Hydrochlorothiazide (Microzide), 25 MG PO DAILY, (Reported)


Neomycin Sulfate (Neomycin Sulfate), 500 MG ORAL TWICE A DAY, (Reported)


Omeprazole (Omeprazole), 20 MG ORAL TWICE A DAY, (Reported)


Rosuvastatin Calcium* (Crestor*), 20 MG ORAL DAILY, (Reported)


Rosuvastatin Calcium* (Crestor*), 20 MG ORAL DAILY, (Reported)


Triamterene/Hydrochlorothiazid (Triamterene-Hctz 37.5-25 Mg Cp), 1 CAP ORAL 

DAILY, (Reported)





Scheduled PRN


Diazepam* (Diazepam*), 5 MG ORAL BID PRN for ANXIETY, (Reported)


Hydrocodone Bit/Acetaminophen 5-325* (Norco 5-325*), 1 TAB ORAL Q4H PRN for For 

Pain, (Reported)


Hydrocodone Bit/Acetaminophen 5-325* (Norco 5-325*), 2 TAB ORAL Q4H PRN for For 

Pain, (Reported)





Miscellaneous Medications


Ceftriaxone Sod (Rocephin), 250 MG IM, (Reported)





Patient History


Healthcare decision maker


N


Resuscitation status


Full Code


Advanced Directive on File








Physical Exam





Last 24 Hour Vital Signs








  Date Time  Temp Pulse Resp B/P (MAP) Pulse Ox O2 Delivery O2 Flow Rate FiO2


 


19 15:49  54      


 


19 12:00 98.2 98 22 140/64 (89) 99   


 


19 12:00  64      


 


19 08:57  66  143/61    


 


19 08:12      Room Air  


 


19 08:08 98.2 66 22 143/61 (88) 100   


 


19 07:57  60      


 


19 04:00 98.2 63 18 115/84 (94) 94   


 


19 04:00  58      


 


19 00:00  57      


 


19 00:00 98.2 66 18 152/81 (104) 96   


 


19 21:17  60  139/78 (98)    


 


19 21:00      Room Air  


 


19 20:00 98.5 69 18  95   


 


19 20:00    171/85    


 


19 20:00  63      


 


19 19:59    171/85 (113)    

















Intake and Output  


 


 19





 19:00 07:00


 


Intake Total 600 ml 


 


Balance 600 ml 


 


  


 


Intake Oral 600 ml 


 


# Voids 3 3


 


# Bowel Movements 11 11








Height (Feet):  5


Height (Inches):  4.00


Weight (Pounds):  196


Medications





Current Medications








 Medications


  (Trade)  Dose


 Ordered  Sig/George


 Route


 PRN Reason  Start Time


 Stop Time Status Last Admin


Dose Admin


 


 Barium Sulfate


  (Readi-Cat 2)  450 ml  NOW  PRN


 ORAL


 Radiology Procedure  19 14:15


 19 14:06  19 17:48


 


 


 Clonidine HCl


  (Catapres Tab)  0.1 mg  Q4H  PRN


 ORAL


 sbp>170  19 14:00


 10/27/19 13:59  19 20:00


 


 


 Diltiazem HCl


  (Cardizem CD)  240 mg  DAILY


 ORAL


   19 09:00


 10/28/19 08:59  19 08:57


 


 


 Iohexol


  (Omnipaque)  100 mg  NOW  PRN


 INJ


 Radiology Procedure  19 14:15


 19 14:06   


 


 


 Iohexol


  (Omnipaque)  100 mg  NOW  PRN


 INJ


 Radiology Procedure  19 14:15


 19 14:06   


 


 


 Morphine Sulfate


  (Morphine


 Sulfate)  2 mg  Q4H  PRN


 IVP


 For Pain  19 17:15


 10/3/19 17:14  19 14:26


 


 


 Ondansetron HCl


  (Zofran)  4 mg  Q6H  PRN


 IVP


 Nausea & Vomiting  19 16:45


 10/26/19 16:44  19 10:51


 











Assessment/Plan


Assessment/Plan:


HEMATOLOGY/ONCOLOGY Consultation Note





DATE OF CONSULT: 19 


REFERRING PHYSICIAN: Jose Alberto Garcia


REASON FOR CONSULT: Renal mass, anemia hx 





HISTORY OF PRESENT ILLNESS: 


74y old female well known to me from prior admission, hx of gi bleed in the past

, sees us in the clinic as well also has been having abdominal cramps that has 

been going on for a week. The patient was admitted for diverticulitis eval 

flare that she has. The patient denies chills. Denies cough. Denies shortness 

of breath. Denies orthopnea. Hematology/Oncology services have been consulted 

for the evaluation of a renal mass that was found on an abd CT. Path report 

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 





PAST MEDICAL HISTORY:  Significant for chest pain, history of diverticulitis, 

history of renal cancer, constipation, hypertension, chronic pain syndrome, 

hyperlipidemia, hypertension, and dyslipidemia.





PAST SURGICAL HISTORY:  Ovarian cyst removed, partial right nephrectomy for 

cancer.





ALLERGIES:  Tramadol, codeine.





SOCIAL HISTORY:  Denies history of smoking, alcohol, or illicit drugs.





REVIEW OF SYSTEMS:  


HEENT:  Denies headaches.    


RESPIRATORY:  Denies shortness of breath.  Denies cough  


CARDIOVASCULAR:  Denies chest pain.


GASTROINTESTINAL:  Reports vomiting blood and rectal bleed as well as abdominal 

cramps for about one week.   


EXTREMITIES:  Denies pain in the lower extremity.   


CNS:  No change in vision or speech pattern.





PHYSICAL EXAMINATION:


VITAL SIGNS:  Reviewed. 


HEENT:  PERRLA.


NECK:  Supple.  No lymphadenopathy.


CHEST:  Clear to auscultation.


CARDIOVASCULAR:  Regular rate and rhythm.  No murmurs or extra sounds.


GASTROINTESTINAL:  Diffuse abdominal pain.  Abdomen is soft.  No rebound. No 

organomegaly.


EXTREMITIES:  A 1+ edema.  Reflexes equal in both sides.  Moving all four 

extremities. 





MEDICATIONS:  


Lipitor, Colace, Toviaz, Cymbalta, Colace, diltiazem, Crestor, 

hydrochlorothiazide.





IMAGIN/30: CT abd --> Possible early uncomplicated acute diverticulitis of the 

sigmoid. Extensive colonic diverticulosis elsewhere. Note apparent absence of 

previously demonstrated right renal contrast enhancing mass.This could indicate 

in visibility due to the lack of IV contrast, or could indicate prior therapy. 

Correlate with clinical/surgical history 4 mm rectangular density in the right 

middle lobe, visible on earlier  study and unchanged, presumed benign.





LABORATORY STUDIES:  


: wbc 6.1 hgb 12.8 plt 200k 


: wbc 6, hgb 13, plt 268k





ASSESSMENT AND PLAN 


# Renal cell carcinoma, status post nephrectomy that was completed 

approximately 1 month ago, pathology was reviewed and is pT1 staging does not 

require further treatment, lymph nodes not submitted, but no evidence of 

distant metastasis, also no evidence of positive margins noted on the tumor 

report


--> CT abd: 4 mm rectangular density in the right middle lobe, visible on 

earlier  study and unchanged, presumed benign


--> trend cr as per renal, baseline, cr is 1.4-1.5


--> cr trend: 1.4


--> monitor with serial imaging as needed


# Leukopenia is likely a reactive process v infection


--> smear peripherla has been reviewed


--> hepatitis and hiv in the past neg


--> no cirrhosis or hsm on us


--> thyroid studies are wnl in prior


# Anemia of chronic disease


--> closely monitor 


--> currently is stable at 10.8->13


--> hold off any further workup at this time


# Sigmoid diverticulitis. ID and GI are following, appreciate recs. 


--> Abx per ID. 


--> cta reviewed


--> PPI


# Hypertension. 


--> sbp goal <140


# COPD. 


# Chronic kidney disease. cr 1.4-1.5


#  Abd pain as per gi eval





The time note is entered does not reflect time of encounter.





GREATLY APPRECIATE CONSULTATION











Thomas Davis MD Sep 28, 2019 16:29

## 2019-09-28 NOTE — NUR
NURSE NOTES:





Received patient transfer from SIMON Matute from telemetry. Patient is resting in bed with no 
c/o pain, nausea, or SOB  on room air. No skin issues. Bed in lock and locked position with 
call light within reach. IV site c/d/i. Will continue to monitor.

## 2019-09-28 NOTE — GENERAL PROGRESS NOTE
Assessment/Plan


Assessment/Plan:


Assessment


- acute central abd pain, vomiting - ? vial gastroenteritis


- h/o renal carcinoma  


- HTN


- diverticulosis, h/o diverticulitis


- high cholesterol


- SIBO (+)











fu CT ango ordered by cardiology


fu labs


control nausea


last CT in 7/2019





Subjective


ROS Limited/Unobtainable:  Yes


Allergies:  


Coded Allergies:  


     TRAMADOL (Verified  Allergy, Intermediate, 4/4/18)


 TREMORS AND VOMITING


     CODEINE (Verified  Adverse Reaction, Mild, 1/25/13)


 nausea


Subjective


vomited today





Objective





Last 24 Hour Vital Signs








  Date Time  Temp Pulse Resp B/P (MAP) Pulse Ox O2 Delivery O2 Flow Rate FiO2


 


9/28/19 12:00 98.2 98 22 140/64 (89) 99   


 


9/28/19 12:00  64      


 


9/28/19 08:57  66  143/61    


 


9/28/19 08:12      Room Air  


 


9/28/19 08:08 98.2 66 22 143/61 (88) 100   


 


9/28/19 07:57  60      


 


9/28/19 04:00 98.2 63 18 115/84 (94) 94   


 


9/28/19 04:00  58      


 


9/28/19 00:00  57      


 


9/28/19 00:00 98.2 66 18 152/81 (104) 96   


 


9/27/19 21:17  60  139/78 (98)    


 


9/27/19 21:00      Room Air  


 


9/27/19 20:00 98.5 69 18  95   


 


9/27/19 20:00    171/85    


 


9/27/19 20:00  63      


 


9/27/19 19:59    171/85 (113)    


 


9/27/19 15:26  63      

















Intake and Output  


 


 9/27/19 9/28/19





 18:59 06:59


 


Intake Total 600 ml 


 


Balance 600 ml 


 


  


 


Intake Oral 600 ml 


 


# Voids 3 3


 


# Bowel Movements 11 11








Height (Feet):  5


Height (Inches):  4.00


Weight (Pounds):  196


General Appearance:  alert


EENT:  normal ENT inspection


Neck:  supple


Cardiovascular:  normal rate


Respiratory/Chest:  decreased breath sounds


Abdomen:  soft, decreased bowel sounds


Extremities:  non-tender











Daniel Mcclendon MD Sep 28, 2019 13:40

## 2019-09-28 NOTE — CARDIAC ELECTROPHYSIOLOGY PN
Assessment/Plan


Assessment/Plan


1. Chest pain.  Troponin is negative. This patient had a


nuclear stress test which was negative couple months ago.   


   CT angiogram showed no pulmonary embolism or dissection.


2. Hypertension. On  Cardizem  mg daily and hydrochlorothiazide 25 mg 

daily.


3. Hyperlipidemia on Crestor 20 mg daily.


4. Epigastric pain.  Further evaluation by Dr. Couch. CT diverticulosis but 

no diverticulitis


5. History of renal cell carcinoma status post nephrectomy, followed by Dr. Davis.





DW RN





Subjective


Subjective


No CP or SOB. Still has abdominal pain.





Objective





Last 24 Hour Vital Signs








  Date Time  Temp Pulse Resp B/P (MAP) Pulse Ox O2 Delivery O2 Flow Rate FiO2


 


9/28/19 12:00 98.2 98 22 140/64 (89) 99   


 


9/28/19 12:00  64      


 


9/28/19 08:57  66  143/61    


 


9/28/19 08:12      Room Air  


 


9/28/19 08:08 98.2 66 22 143/61 (88) 100   


 


9/28/19 07:57  60      


 


9/28/19 04:00 98.2 63 18 115/84 (94) 94   


 


9/28/19 04:00  58      


 


9/28/19 00:00  57      


 


9/28/19 00:00 98.2 66 18 152/81 (104) 96   


 


9/27/19 21:17  60  139/78 (98)    


 


9/27/19 21:00      Room Air  


 


9/27/19 20:00 98.5 69 18  95   


 


9/27/19 20:00    171/85    


 


9/27/19 20:00  63      


 


9/27/19 19:59    171/85 (113)    


 


9/27/19 15:26  63      

















Intake and Output  


 


 9/27/19 9/28/19





 18:59 06:59


 


Intake Total 600 ml 


 


Balance 600 ml 


 


  


 


Intake Oral 600 ml 


 


# Voids 3 3


 


# Bowel Movements 11 11








Objective


HEAD AND NECK:  Shows no JVD.


LUNGS:  Clear.


CARDIOVASCULAR:  Regular S1 and S2 with no gallop or murmur.


ABDOMEN:  Soft.


EXTREMITIES:  No pitting edema.











Mario Verma MD Sep 28, 2019 14:23

## 2019-09-28 NOTE — NUR
NURSE NOTES:

Dr. Couch called regarding patient complaints of abdominal pain and emesis. No answer. 
Dr. Velasco called and message left.

## 2019-09-28 NOTE — NUR
NURSE NOTES:

Patient discomfort improved after zofran given. Patient eating. Will continue to monitor.

## 2019-09-29 NOTE — NUR
NURSE NOTES:

Patient is in bed awake and able to verbalize needs. Stable. Denies pain or SOB. Patient is 
encouraged to use call light for assistance, verbalized understanding. Patient is in bed in 
locked and lowest position with seizure precautions implemented. All safety measures 
provided. WIll continue to monitor.

## 2019-09-29 NOTE — GENERAL PROGRESS NOTE
Assessment/Plan


Assessment/Plan:


(1) Abdominal pain


(2) H/O Diverticulitis 


(3) Renal cell carcinoma, status post nephrectomy


(4) Lumbar Radiculopathy


(5) Lumbar DDD


Patient to be continued on Morphine as needed


D/w Dr. Saenz and he concurred.





Subjective


Date patient seen:  Sep 29, 2019


Time patient seen:  11:00 - am


Allergies:  


Coded Allergies:  


     TRAMADOL (Verified  Allergy, Intermediate, 4/4/18)


 TREMORS AND VOMITING


     CODEINE (Verified  Adverse Reaction, Mild, 1/25/13)


 nausea


Subjective


Constitutional:  Reports: no symptoms


HEENT:  Reports: no symptoms


Cardiovascular:  Reports: no symptoms


Respiratory:  Reports: no symptoms


Gastrointestinal/Abdominal:  Reports: abdominal pain


Genitourinary:  Reports: no symptoms


Neurologic/Psychiatric:  Reports: no symptoms


Endocrine:  Reports: no symptoms


Hematologic/Lymphatic:  Reports: no symptoms


  


Subjective


Patient in bed showing no signs of pain or distress. Pain has been 


tolerated on the Morphine as needed. Has no new complaints 


at this time.





Objective





Last 24 Hour Vital Signs








  Date Time  Temp Pulse Resp B/P (MAP) Pulse Ox O2 Delivery O2 Flow Rate FiO2


 


9/29/19 09:00      Room Air  


 


9/29/19 08:23  61  159/71    


 


9/29/19 08:00 97.9 61 18 159/71 (100) 95   


 


9/29/19 04:00 97.7 60 18 141/66 (91) 97   


 


9/28/19 20:00 98.6 58 18 117/49 (71) 95   


 


9/28/19 20:00      Room Air  


 


9/28/19 15:49  54      


 


9/28/19 12:00 98.2 98 22 140/64 (89) 99   


 


9/28/19 12:00  64      

















Intake and Output  


 


 9/28/19 9/29/19





 19:00 07:00


 


Intake Total 800 ml 240 ml


 


Balance 800 ml 240 ml


 


  


 


Intake Oral 800 ml 240 ml


 


# Voids 2 1


 


# Bowel Movements  11








Laboratory Tests


9/29/19 05:05: 


White Blood Count 4.0L, Red Blood Count 4.25, Hemoglobin 14.2, Hematocrit 42.4, 

Mean Corpuscular Volume 100H, Mean Corpuscular Hemoglobin 33.4H, Mean 

Corpuscular Hemoglobin Concent 33.5, Red Cell Distribution Width 11.1L, 

Platelet Count 253, Mean Platelet Volume 6.4L, Neutrophils (%) (Auto) 51.3, 

Lymphocytes (%) (Auto) 35.6, Monocytes (%) (Auto) 9.9, Eosinophils (%) (Auto) 

2.0, Basophils (%) (Auto) 1.3, Sodium Level 143, Potassium Level 4.1, Chloride 

Level 106, Carbon Dioxide Level 30, Anion Gap 7, Blood Urea Nitrogen 12, 

Creatinine 1.3, Estimat Glomerular Filtration Rate , Glucose Level 93, Calcium 

Level 9.7, Total Bilirubin 0.9, Aspartate Amino Transf (AST/SGOT) 24, Alanine 

Aminotransferase (ALT/SGPT) 27, Alkaline Phosphatase 109, Total Protein 7.6, 

Albumin 3.6, Globulin 4.0, Albumin/Globulin Ratio 0.9L, Amylase Level 54, 

Lipase 57L


Height (Feet):  5


Height (Inches):  4.00


Weight (Pounds):  196


Objective


General Appearance:  no apparent distress, alert


EENT:  PERRL/EOMI, normal ENT inspection


Neck:  normal alignment, supple


Cardiovascular:  normal rate, regular rhythm


Respiratory/Chest:  lungs clear, normal breath sounds


Abdomen:  soft


Neurologic:  alert, oriented x 3


Skin:  warm/dry











Spencer Murphy Sep 29, 2019 11:47

## 2019-09-29 NOTE — NUR
NURSE NOTES:





Received report from SIMON Burkett. Patient is resting comfortably in bed. No c/o of SOB. 
Denies severe pain and "does not want any pain meds". Padding on 2 bed rails, bed in low and 
locked position, call light within reach.

## 2019-09-29 NOTE — GENERAL PROGRESS NOTE
Assessment/Plan


Status:  progressing


Assessment/Plan:


Assessment


- acute central abd pain, vomiting - ? vial gastroenteritis


- h/o renal carcinoma  


- HTN


- diverticulosis, h/o diverticulitis


- high cholesterol


- SIBO (+)











fu CT ango ordered by cardiology


fu labs


control nausea


last CT in 7/2019





Subjective


ROS Limited/Unobtainable:  Yes


Allergies:  


Coded Allergies:  


     TRAMADOL (Verified  Allergy, Intermediate, 4/4/18)


 TREMORS AND VOMITING


     CODEINE (Verified  Adverse Reaction, Mild, 1/25/13)


 nausea


Subjective


vomited today


abd pain


soft stools





Objective





Last 24 Hour Vital Signs








  Date Time  Temp Pulse Resp B/P (MAP) Pulse Ox O2 Delivery O2 Flow Rate FiO2


 


9/29/19 04:00 97.7 60 18 141/66 (91) 97   


 


9/28/19 20:00 98.6 58 18 117/49 (71) 95   


 


9/28/19 20:00      Room Air  


 


9/28/19 15:49  54      


 


9/28/19 12:00 98.2 98 22 140/64 (89) 99   


 


9/28/19 12:00  64      


 


9/28/19 08:57  66  143/61    


 


9/28/19 08:12      Room Air  


 


9/28/19 08:08 98.2 66 22 143/61 (88) 100   


 


9/28/19 07:57  60      

















Intake and Output  


 


 9/28/19 9/29/19





 19:00 07:00


 


Intake Total 800 ml 240 ml


 


Balance 800 ml 240 ml


 


  


 


Intake Oral 800 ml 240 ml


 


# Voids 2 1


 


# Bowel Movements  11








Laboratory Tests


9/29/19 05:05: 


White Blood Count 4.0L, Red Blood Count 4.25, Hemoglobin 14.2, Hematocrit 42.4, 

Mean Corpuscular Volume 100H, Mean Corpuscular Hemoglobin 33.4H, Mean 

Corpuscular Hemoglobin Concent 33.5, Red Cell Distribution Width 11.1L, 

Platelet Count 253, Mean Platelet Volume 6.4L, Neutrophils (%) (Auto) 51.3, 

Lymphocytes (%) (Auto) 35.6, Monocytes (%) (Auto) 9.9, Eosinophils (%) (Auto) 

2.0, Basophils (%) (Auto) 1.3, Sodium Level 143, Potassium Level 4.1, Chloride 

Level 106, Carbon Dioxide Level 30, Anion Gap 7, Blood Urea Nitrogen 12, 

Creatinine 1.3, Estimat Glomerular Filtration Rate , Glucose Level 93, Calcium 

Level 9.7, Total Bilirubin 0.9, Aspartate Amino Transf (AST/SGOT) 24, Alanine 

Aminotransferase (ALT/SGPT) 27, Alkaline Phosphatase 109, Total Protein 7.6, 

Albumin 3.6, Globulin 4.0, Albumin/Globulin Ratio 0.9L, Amylase Level 54, 

Lipase 57L


Height (Feet):  5


Height (Inches):  4.00


Weight (Pounds):  196


General Appearance:  alert


EENT:  normal ENT inspection


Neck:  normal alignment


Cardiovascular:  normal rate


Respiratory/Chest:  decreased breath sounds


Abdomen:  normal bowel sounds, non tender, soft


Extremities:  non-tender











Daniel Mcclendon MD Sep 29, 2019 07:42

## 2019-09-29 NOTE — GENERAL PROGRESS NOTE
Assessment/Plan


Problem List:  


(1) chest pain


(2) S/p nephrectomy


ICD Codes:  Z90.5 - Acquired absence of kidney


SNOMED:  03938992, 621560306


(3) Intractable pain


ICD Codes:  R52 - Pain, unspecified


SNOMED:  96317743


(4) Anemia


ICD Codes:  D64.9 - Anemia, unspecified


SNOMED:  781692171


(5) GI problem


ICD Codes:  R19.8 - GI problem


SNOMED:  638263631


(6) HTN (hypertension)


ICD Codes:  I10 - Essential (primary) hypertension


SNOMED:  97422661


(7) Abdominal pain


ICD Codes:  R10.9 - Unspecified abdominal pain


SNOMED:  28492504, 953233225


(8) Chest pain


ICD Codes:  R07.9 - Chest pain, unspecified


SNOMED:  08915988


Status:  progressing


Assessment/Plan:


atypical cp


r/o acs


vitals stable


abdominal pain


nausea


no diverticulitis on imaging


does have diverticulosis





Subjective


ROS Limited/Unobtainable:  Yes


Gastrointestinal/Abdominal:  Reports: abdominal pain


Allergies:  


Coded Allergies:  


     TRAMADOL (Verified  Allergy, Intermediate, 4/4/18)


 TREMORS AND VOMITING


     CODEINE (Verified  Adverse Reaction, Mild, 1/25/13)


 nausea





Objective





Last 24 Hour Vital Signs








  Date Time  Temp Pulse Resp B/P (MAP) Pulse Ox O2 Delivery O2 Flow Rate FiO2


 


9/29/19 12:00 98.0 60 20 153/74 (100) 98   


 


9/29/19 09:00      Room Air  


 


9/29/19 08:23  61  159/71    


 


9/29/19 08:00 97.9 61 18 159/71 (100) 95   


 


9/29/19 04:00 97.7 60 18 141/66 (91) 97   


 


9/28/19 20:00 98.6 58 18 117/49 (71) 95   


 


9/28/19 20:00      Room Air  


 


9/28/19 15:49  54      

















Intake and Output  


 


 9/28/19 9/29/19





 19:00 07:00


 


Intake Total 800 ml 240 ml


 


Balance 800 ml 240 ml


 


  


 


Intake Oral 800 ml 240 ml


 


# Voids 2 1


 


# Bowel Movements  11








Laboratory Tests


9/29/19 05:05: 


White Blood Count 4.0L, Red Blood Count 4.25, Hemoglobin 14.2, Hematocrit 42.4, 

Mean Corpuscular Volume 100H, Mean Corpuscular Hemoglobin 33.4H, Mean 

Corpuscular Hemoglobin Concent 33.5, Red Cell Distribution Width 11.1L, 

Platelet Count 253, Mean Platelet Volume 6.4L, Neutrophils (%) (Auto) 51.3, 

Lymphocytes (%) (Auto) 35.6, Monocytes (%) (Auto) 9.9, Eosinophils (%) (Auto) 

2.0, Basophils (%) (Auto) 1.3, Sodium Level 143, Potassium Level 4.1, Chloride 

Level 106, Carbon Dioxide Level 30, Anion Gap 7, Blood Urea Nitrogen 12, 

Creatinine 1.3, Estimat Glomerular Filtration Rate , Glucose Level 93, Calcium 

Level 9.7, Total Bilirubin 0.9, Aspartate Amino Transf (AST/SGOT) 24, Alanine 

Aminotransferase (ALT/SGPT) 27, Alkaline Phosphatase 109, Total Protein 7.6, 

Albumin 3.6, Globulin 4.0, Albumin/Globulin Ratio 0.9L, Amylase Level 54, 

Lipase 57L


Height (Feet):  5


Height (Inches):  4.00


Weight (Pounds):  196


Cardiovascular:  regular rhythm


Respiratory/Chest:  lungs clear











Jose Alberto Garcia MD Sep 29, 2019 15:33

## 2019-09-30 NOTE — NUR
NURSE NOTES:

Patient discharged home as ordered. Stable. Denies pain or SOB. Patient was given thorough 
discharge instructions by RN. Patient verbalized understanding. Patient knows that she must 
follow up with Dr. Garcia and Dr. Davis and has contact information. Patient read 
discharge instructions along with RN. Skin is clean, dry, and intact. Patient has all 
belongings. Patient's sister Melissa picked up patient. IV access removed. Patient assisted 
into private vehicle by RN without incident.

## 2019-09-30 NOTE — HEMATOLOGY/ONC PROGRESS NOTE
Assessment/Plan


Assessment/Plan








ASSESSMENT AND PLAN 


# Renal cell carcinoma, status post nephrectomy that was completed 

approximately 1 month ago, pathology was reviewed and is pT1 staging does not 

require further treatment, lymph nodes not submitted, but no evidence of 

distant metastasis, also no evidence of positive margins noted on the tumor 

report


--> CT abd: 4 mm rectangular density in the right middle lobe, visible on 

earlier 2015 study and unchanged, presumed benign


--> trend cr as per renal, baseline, cr is 1.4-1.5


--> cr trend: 1.4


--> monitor with serial imaging as needed


# Leukopenia is likely a reactive process v infection


--> smear peripherla has been reviewed


--> hepatitis and hiv in the past neg


--> no cirrhosis or hsm on us


--> thyroid studies are wnl in prior


# Anemia of chronic disease


--> closely monitor 


--> currently is stable at 10.8->13


--> hold off any further workup at this time


# Sigmoid diverticulitis. ID and GI are following, appreciate recs. 


--> Abx per ID. 


--> cta reviewed


--> PPI


# Hypertension. 


--> sbp goal <140


# COPD. 


# Chronic kidney disease. cr 1.4-1.5


#  Abd pain as per gi eval





The time note is entered does not reflect time of encounter.





GREATLY APPRECIATE CONSULTATION





Subjective


Constitutional:  Denies: no symptoms, chills, fever, malaise, weakness, other


HEENT:  Denies: no symptoms, eye pain, blurred vision, tearing, double vision, 

ear pain, ear discharge, nose pain, nose congestion, throat pain, throat 

swelling, mouth pain, mouth swelling, other


Cardiovascular:  Denies: no symptoms, chest pain, edema, irregular heart rate, 

lightheadedness, palpitations, syncope, other


Respiratory:  Denies: no symptoms, cough, shortness of breath, SOB with 

excertion, SOB at rest, sputum, wheezing, other


Gastrointestinal/Abdominal:  Denies: no symptoms, abdomen distended, abdominal 

pain, black stools, tarry stools, blood in stool, constipated, diarrhea, 

difficulty swallowing, nausea, poor appetite, poor fluid intake, rectal bleeding

, vomiting, other


Neurologic/Psychiatric:  Denies: no symptoms, anxiety, depressed, emotional 

problems, headache, numbness, paresthesia, pre-existing deficit, seizure, 

tingling, tremors, weakness, other


Endocrine:  Denies: no symptoms, excessive sweating, flushing, intolerance to 

cold, intolerance to heat, increased hunger, increased thirst, increased urine, 

unexplained weight gain, unexplained weight loss, other


Allergies:  


Coded Allergies:  


     TRAMADOL (Verified  Allergy, Intermediate, 4/4/18)


 TREMORS AND VOMITING


     CODEINE (Verified  Adverse Reaction, Mild, 1/25/13)


 nausea


Subjective


9/30: stable for dc, have dw rn, no bleeding, chills or ns noted, no bleeding 

episodes





Objective


Objective





Last 24 Hour Vital Signs








  Date Time  Temp Pulse Resp B/P (MAP) Pulse Ox O2 Delivery O2 Flow Rate FiO2


 


9/30/19 09:00      Room Air  


 


9/30/19 08:52  63  152/76    


 


9/30/19 08:00 97.7 63 18 152/76 (101) 98   


 


9/30/19 04:00 97.4 60 18 167/74 (105) 100   


 


9/29/19 21:00      Room Air  


 


9/29/19 20:00 97.8 56 18 130/67 (88) 96   


 


9/29/19 16:01 97.8 58 17 135/70 (91) 99   


 


9/29/19 12:00 98.0 60 20 153/74 (100) 98   


 


9/29/19 09:00      Room Air  


 


9/29/19 08:23  61  159/71    


 


9/29/19 08:00 97.9 61 18 159/71 (100) 95   


 


9/29/19 04:00 97.7 60 18 141/66 (91) 97   


 


9/28/19 20:00 98.6 58 18 117/49 (71) 95   


 


9/28/19 20:00      Room Air  


 


9/28/19 15:49  54      

















Intake and Output  


 


 9/29/19 9/30/19





 19:00 07:00


 


Intake Total 780 ml 480 ml


 


Balance 780 ml 480 ml


 


  


 


Intake Oral 780 ml 480 ml


 


# Voids  2











Labs








Test


  9/29/19


05:05 9/30/19


04:55


 


White Blood Count


  4.0 K/UL


(4.8-10.8) 3.8 K/UL


(4.8-10.8)


 


Red Blood Count


  4.25 M/UL


(4.20-5.40) 3.96 M/UL


(4.20-5.40)


 


Hemoglobin


  14.2 G/DL


(12.0-16.0) 13.3 G/DL


(12.0-16.0)


 


Hematocrit


  42.4 %


(37.0-47.0) 39.2 %


(37.0-47.0)


 


Mean Corpuscular Volume 100 FL (80-99)  99 FL (80-99) 


 


Mean Corpuscular Hemoglobin


  33.4 PG


(27.0-31.0) 33.5 PG


(27.0-31.0)


 


Mean Corpuscular Hemoglobin


Concent 33.5 G/DL


(32.0-36.0) 33.8 G/DL


(32.0-36.0)


 


Red Cell Distribution Width


  11.1 %


(11.6-14.8) 10.8 %


(11.6-14.8)


 


Platelet Count


  253 K/UL


(150-450) 250 K/UL


(150-450)


 


Mean Platelet Volume


  6.4 FL


(6.5-10.1) 6.4 FL


(6.5-10.1)


 


Neutrophils (%) (Auto)


  51.3 %


(45.0-75.0) 50.5 %


(45.0-75.0)


 


Lymphocytes (%) (Auto)


  35.6 %


(20.0-45.0) 37.2 %


(20.0-45.0)


 


Monocytes (%) (Auto)


  9.9 %


(1.0-10.0) 9.6 %


(1.0-10.0)


 


Eosinophils (%) (Auto)


  2.0 %


(0.0-3.0) 1.6 %


(0.0-3.0)


 


Basophils (%) (Auto)


  1.3 %


(0.0-2.0) 1.1 %


(0.0-2.0)


 


Sodium Level


  143 MMOL/L


(136-145) 144 MMOL/L


(136-145)


 


Potassium Level


  4.1 MMOL/L


(3.5-5.1) 4.0 MMOL/L


(3.5-5.1)


 


Chloride Level


  106 MMOL/L


() 109 MMOL/L


()


 


Carbon Dioxide Level


  30 MMOL/L


(21-32) 27 MMOL/L


(21-32)


 


Anion Gap


  7 mmol/L


(5-15) 8 mmol/L


(5-15)


 


Blood Urea Nitrogen


  12 mg/dL


(7-18) 11 mg/dL


(7-18)


 


Creatinine


  1.3 MG/DL


(0.55-1.30) 1.3 MG/DL


(0.55-1.30)


 


Estimat Glomerular Filtration


Rate  mL/min (>60) 


   mL/min (>60) 


 


 


Glucose Level


  93 MG/DL


() 92 MG/DL


()


 


Calcium Level


  9.7 MG/DL


(8.5-10.1) 9.3 MG/DL


(8.5-10.1)


 


Total Bilirubin


  0.9 MG/DL


(0.2-1.0) 


 


 


Aspartate Amino Transf


(AST/SGOT) 24 U/L (15-37) 


  


 


 


Alanine Aminotransferase


(ALT/SGPT) 27 U/L (12-78) 


  


 


 


Alkaline Phosphatase


  109 U/L


() 


 


 


Total Protein


  7.6 G/DL


(6.4-8.2) 


 


 


Albumin


  3.6 G/DL


(3.4-5.0) 


 


 


Globulin 4.0 g/dL  


 


Albumin/Globulin Ratio 0.9 (1.0-2.7)  


 


Amylase Level


  54 U/L


() 


 


 


Lipase


  57 U/L


() 


 








Height (Feet):  5


Height (Inches):  4.00


Weight (Pounds):  196


Objective





PHYSICAL EXAMINATION:


VITAL SIGNS:  Reviewed. 


HEENT:  PERRLA.


NECK:  Supple.  No lymphadenopathy.


CHEST:  Clear to auscultation.


CARDIOVASCULAR:  Regular rate and rhythm.  No murmurs or extra sounds.


GASTROINTESTINAL:  Diffuse abdominal pain.  Abdomen is soft.  No rebound. No 

organomegaly.


EXTREMITIES:  A 1+ edema.  Reflexes equal in both sides.  Moving all four 

extremities.











Thomas Davis MD Sep 30, 2019 14:02

## 2019-09-30 NOTE — NUR
NURSE NOTES:

Patient is in bed awake and able to verbalize needs. Stable. Denies pain or SOB. Patient is 
sitting up eating breakfast and talking to her friend on the phone. Patient encouraged to 
use call light for assistance, verbalized understanding. Patient stated, "i'm good, i don't 
need anything right now." Patient is in bed in locked and lowest position with call light 
within reach. Will continue to monitor.

## 2019-10-01 NOTE — CDS PHYSICIAN QUERY
Clarification is required for compliance, coding accuracy, and to reflect 
severity of illness for this patient



Dear Dr. Mario Verma M.D.                      Date: 10/01/2019



/CDS' Name : Washingtno Liu     







Please document the suspected etiology of Chest Pain:





[] Aortic dissection                   

[] Acute myocardial infarction              

[] Acute Coronary Syndrome                   

[] Pericarditis   

[] Anxiety                         

[] Cancer                         

[] Pneumonia

[] Costochondritis             

[] Pneumothorax      

[] GERD/Esophagitis          

[] Pulmonary embolism   

[] Other:___________________

[] Unable to determine



Present on Admission:  []  Yes          []  No         []  Clinically 
Undetermined





_________________                                    _____________

Physician signature                                       Date



Please also document in your Progress Notes and/or Discharge Summary and 
indicate if the condition was present on admission.

MTDD

## 2019-10-01 NOTE — DIAGNOSTIC IMAGING REPORT
CLINICAL INDICATION:Chest pain, hypertension, epigastric pain

 

TECHNIQUE: Patient ingested oral contrast.  IV administration nonionic

contrast.  Arterial phase spiral acquisitions obtained through the chest, abdomen,

and pelvis.   Multiplanar  and 3-D reconstructions were generated. Total dose length

product 1964 mGycm.  CTDIvol(s) 32 mGy. Radiation dose was minimized using automated

exposure control

 

COMPARISON: Abdomen pelvis CT dated 7/23/2019. No comparison chest CTs

 

FINDINGS

 

Chest:  There is no evidence of thoracic aortic aneurysm or dissection. There

is variant branching anatomy of the great neck vessels, with separate origin of the

vertebral artery off of the thoracic aorta. Exam protocols not tailored for exclusion

of pulmonary embolus. Nonetheless, the pulmonary arteries are well-opacified and

there is no evidence of filling defect or other findings to suggest acute pulmonary

embolus.

 

The lungs are clear. No infiltrates, effusions, masses, nodules, or congestion.

 

The heart size is normal. No pericardial effusion. The esophagus is unremarkable. The

thyroid is enlarged but there are no focal nodules or masses. No axillary chest wall

mass or adenopathy. No supraclavicular mass or adenopathy.

 

Abdomen pelvis: No evidence of abdominal aortic aneurysm or dissection. There

is mild osteoporotic plaquing of the distal abdominal aorta but no evidence of

significant stenosis. The mesenteric and iliac vessels are normal in caliber.

 

The liver is diffusely hypoattenuating, consistent with fatty change. There are some

areas of sparing noted. There is a subcentimeter low-attenuation lesion again

demonstrated in segment 8, too small to characterize but unchanged. The pancreas is

fatty replaced. No biliary ductal dilatation. The gallbladder contains high

attenuation material, presumably contrast, etiology uncertain. The spleen and

adrenals are unremarkable. The kidneys demonstrate cysts bilaterally. Again

demonstrated is an indeterminate lesion coming off of the lower pole of the left

kidney, measuring 8 mm in diameter and appearing unchanged. No retroperitoneal or

mesenteric mass or adenopathy. No pelvic mass or adenopathy.

 

There is colonic diverticulosis. No evidence of diverticulitis. The appendix is not

definitely demonstrated, but there are no findings to suggest acute appendicitis. No

small bowel distention. Contrast is seen throughout the entirety of the small bowel

and into the proximal colon. No small bowel distention. No small bowel wall

thickening. There is a fat-containing ventral hernia again demonstrated. No free or

loculated intraperitoneal gas or fluid is evident.

 

The bones are unremarkable except for degenerative changes of the lumbar spine.



IMPRESSION: No acute thoracic vascular or other abnormality

 

Enlarged thyroid without evidence of discrete mass

 

No evidence of acute abdominal or pelvic vascular pathology

 

Indeterminate 8 mm left lower pole renal lesion, also previously described. Continued

monitoring versus ultrasound recommended

 

Fatty liver

 

Colonic diverticulosis. No evidence of diverticulitis

 

Incidental findings as noted, including fat-containing ventral hernia, degenerative

spondylosis

 

This agrees with the preliminary interpretation provided overnight by [facility]

teleradiology service.

 

The CT scanner at [facility] is accredited by the [employer] College of

Radiology and the scans are performed using protocols designed to limit radiation

exposure to as low as reasonably achievable to attain images of sufficient resolution

adequate for diagnostic evaluation.

## 2019-10-01 NOTE — DISCHARGE SUMMARY
Discharge Summary


Discharge Summary


_


DATE OF ADMISSION: 9/26/2019





DATE OF DISCHARGE: 9/30/2019








DISCHARGED BY: 





REASON FOR ADMISSION: 


74 years old female with past medical history of hypertension, hyperlipidemia, 

COPD, ulcerative colitis, seizure disorder, diabetes mellitus, chronic pain 

syndrome, diverticulosis with history of diverticulitis, GERD, history of C. 

difficile colitis, history of kidney cancer, initially presented to [place] 

emergency department,  complaining of chest pain radiating to the back for 1 

day and epigastric pain  with associated nausea and vomiting.  


She denied diaphoresis.  


She denied shortness of breath.  


Patient  was previously admitted to this hospital about 2 months ago for 

similar symptoms.  


At that time patient had a nuclear stress test, which revealed no evidence of 

ischemia.  


Patient also had at that time CT scan of the abdomen and pelvis without IV 

contrast , which showed no acute abnormality.


Patient was transferred to [facility] for insurance purposes.





CONSULTANTS:


cardiologist Dr. Jimenez


GI specialist Dr. Mcclendon 


hematologist/oncologist Dr. Davis


pain specialist Dr. Saenz


 


 


 


HOSPITAL COURSE: 


Patient admitted initially to telemetry floor.  


Serial troponin  were negative . EKG revealed no acute ischemic changes.  


CT angiogram  showed no evidence of pulmonary emboli or dissection.  The lungs 

were clear. No infiltrates, effusions, masses, nodules, or congestion.


 


Venous duplex bilateral lower extremity revealed no evidence of acute DVT.  


Abdominal x-ray revealed no acute findings.  


Blood pressure was managed with Cardizem and hydrochlorothiazide.  


Statin continued.


Supplemental oxygen titrated as needed to keep pulse oximetry above 92%.


Pulse oximetry was stable on room air.


No evidence of respiratory distress.   


Pain management was addressed.  


GI specialist followed.


Patient is evidence of acute central abdominal pain and vomiting , probably  

viral gastroenteritis.  


Patient had diverticulosis without evidence of diverticulitis and fatty liver 

on CT scan of abdomen. 


Per GI specialist , patient likely had viral gastroenteritis. Patient also had 

small bowel intestinal overgrowth. 


Per cardio, chest pain was of unclear etiology. 


Prior cardiac workup was negative. 


Chest pain was possibly related to GI issues/symptoms. 


Pain management was addressed as per pain specialist recommendation.





Oncologist followed.   


Patient status post nephrectomy  about 2 months ago.  


Pathology showed  PT1 staging,  no further treatment  required, as per 

oncologist.  


Lymph nodes were not submitted,  but there was no evidence of distant metastasis

,  no evidence of positive margins on the tumor report.  


CT abdomen showed 4 mm rectangular density in the right middle lobe , unchanged 

from the 2015 study, presumed benign.  


Fatty liver. Colonic diverticulosis. No evidence of diverticulitis


 


Leukopenia was likely reactive process.  


Peripheral smear has been reviewed.  


Hepatitis panel in the past was  negative.


HIV test in the past was  nonreactive.  


No history of cirrhosis or hepatosplenomegaly on ultrasound.  


Thyroid studies within normal limits.  


Hemoglobin  and hematocrit  were closely monitored ,  remained stable.  


Renal parameters and electrolytes were closely monitored,  electrolytes 

corrected as needed , and nephrotoxins were avoided


Creatinine upon discharge   down to 1.3.  


Patient with evidence of known history of chronic kidney disease.  





Patient clinically stabilized and was ready for discharge home . 








FINAL DIAGNOSES: 


Epigastric pain


Possible viral gastroenteritis


Diverticulosis without evidence of diverticulitis 


SIBO ( small intestinal bacterial overgrowth) 


History of renal cell carcinoma status post nephrectomy 


Chest pain /etiology unable to be determined 


Hypertension


Hyperlipidemia


Radiculopathy


Lumbar DDD





DISCHARGE MEDICATIONS:


See Medication Reconciliation list.





DISCHARGE INSTRUCTIONS:


Patient was discharged home


Follow up with primary care provider in one week.





I have been assigned to dictate discharge summary for this account. 


I was not involved in the patient's management.











Chikis Martin NP Oct 1, 2019 08:27

## 2019-10-01 NOTE — CONSULTATION
DATE OF CONSULTATION:  09/27/2019

INFECTIOUS DISEASE CONSULTATION



CONSULTING PHYSICIAN:  Leonardo Jacob M.D.



PRIMARY ATTENDING PHYSICIAN:  Jose Alberto Garcia M.D.



REASON FOR CONSULTATION:  Gastroenteritis.



HISTORY OF PRESENT ILLNESS:  This is a 74-year-old  female,

admitted last night from ER at the [place] [facility].  The patient was sent

to the hospital because of epigastric and back pain.  The patient also had

nausea, vomiting, and diarrhea.  The patient had a recent admission in

July of 2019 to [facility] with similar symptoms.  A CT scan

of the abdomen in [place] showed a diverticulosis without diverticulitis.

The patient had no fever and other systemic problems.



PAST MEDICAL HISTORY:  Significant for diabetes mellitus type 2,

hypertension.  She had renal cell cancer, status post partial nephrectomy,

seizure disorder, diverticulitis before.



ALLERGIES:  Allergic to codeine, tramadol.



MEDICATIONS:  Getting _____, clonidine, morphine, Zofran.



SOCIAL HISTORY:  No history of alcohol, drug abuse, smoking.  Single.  She

has no child.



REVIEW OF SYSTEMS:  No fever.  No chills.  She has hiccups, nausea,

vomiting, diarrhea.  No coughing.  No problem passing urine.



PHYSICAL EXAMINATION:

VITAL SIGNS:  Temperature 97.5, pulse 86, blood pressure 132/73.

GENERAL APPEARANCE:  Well developed.  No acute distress.

HEAD AND NECK:  Slightly dry.

HEART:  Normal rate.

LUNGS:  Clear.

ABDOMEN:  Soft, nontender.

EXTREMITIES:  No edema.

NEUROLOGIC:  Awake, alert, oriented x3.



LABORATORY DATA:  WBC 5.9, hemoglobin 13.5, hematocrit 40.2, and platelets

233,000.  Sodium 139, potassium 3.9, chloride 105, bicarbonate 26, BUN 8,

creatinine 1.4.  Alkaline phosphatase is a little high at 117, lipase 65.

CT scan of the abdomen and pelvis in [place] showed diverticulosis without

diverticulitis and a small solitary pulmonary nodule.



IMPRESSION:

1. Diverticulosis without diverticulitis.

2. She has nausea, vomiting, diarrhea.

3. May have gastroenteritis.

4. Hypertension.

5. COPD.

6. Asthma.

7. Seizure disorder.



RECOMMENDATION:  Observe off antibiotic.  We will follow up the

lab.



At the end of my exam, I thank Dr. Garcia for involving me in the care

of this patient.









  ______________________________________________

  Leonardo Jacob M.D.





DR:  ELOISE

D:  09/27/2019 15:12

T:  09/27/2019 17:41

JOB#:  [id number]

CC:

## 2023-03-30 NOTE — NUR
HAND-OFF: 

Report given to SIMON Wilde. Patient's in bed, no s/s of acute distress or SOB. Denies pain. 
Patient's in stable condition. Plan of care endorsed. daily Multivitamin and Vitamin D3